# Patient Record
Sex: MALE | Race: BLACK OR AFRICAN AMERICAN | Employment: UNEMPLOYED | URBAN - METROPOLITAN AREA
[De-identification: names, ages, dates, MRNs, and addresses within clinical notes are randomized per-mention and may not be internally consistent; named-entity substitution may affect disease eponyms.]

---

## 2021-01-06 ENCOUNTER — APPOINTMENT (OUTPATIENT)
Dept: GENERAL RADIOLOGY | Age: 61
End: 2021-01-06
Payer: MEDICARE

## 2021-01-06 ENCOUNTER — HOSPITAL ENCOUNTER (EMERGENCY)
Age: 61
Discharge: HOME | End: 2021-01-06
Attending: EMERGENCY MEDICINE
Payer: MEDICARE

## 2021-01-06 VITALS
TEMPERATURE: 97.5 F | RESPIRATION RATE: 16 BRPM | DIASTOLIC BLOOD PRESSURE: 96 MMHG | SYSTOLIC BLOOD PRESSURE: 138 MMHG | OXYGEN SATURATION: 98 % | BODY MASS INDEX: 25.11 KG/M2 | HEART RATE: 79 BPM | WEIGHT: 160 LBS | HEIGHT: 67 IN
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
## 2021-01-06 DIAGNOSIS — M25.552 LEFT HIP PAIN: Primary | ICD-10-CM

## 2021-01-06 PROCEDURE — 73030 X-RAY EXAM OF SHOULDER: CPT

## 2021-01-06 PROCEDURE — 6370000000 HC RX 637 (ALT 250 FOR IP): Performed by: STUDENT IN AN ORGANIZED HEALTH CARE EDUCATION/TRAINING PROGRAM

## 2021-01-06 PROCEDURE — 73130 X-RAY EXAM OF HAND: CPT

## 2021-01-06 PROCEDURE — 73502 X-RAY EXAM HIP UNI 2-3 VIEWS: CPT

## 2021-01-06 PROCEDURE — 99284 EMERGENCY DEPT VISIT MOD MDM: CPT

## 2021-01-06 RX ORDER — IBUPROFEN 400 MG/1
400 TABLET ORAL ONCE
Status: COMPLETED | OUTPATIENT
Start: 2021-01-06 | End: 2021-01-06

## 2021-01-06 RX ORDER — ACETAMINOPHEN 500 MG
1000 TABLET ORAL ONCE
Status: COMPLETED | OUTPATIENT
Start: 2021-01-06 | End: 2021-01-06

## 2021-01-06 RX ORDER — METHOCARBAMOL 750 MG/1
750 TABLET, FILM COATED ORAL 4 TIMES DAILY
Qty: 12 TABLET | Refills: 0 | Status: SHIPPED | OUTPATIENT
Start: 2021-01-06 | End: 2021-01-09

## 2021-01-06 RX ORDER — LIDOCAINE 4 G/G
1 PATCH TOPICAL DAILY
Status: DISCONTINUED | OUTPATIENT
Start: 2021-01-06 | End: 2021-01-06 | Stop reason: HOSPADM

## 2021-01-06 RX ORDER — CYCLOBENZAPRINE HCL 10 MG
5 TABLET ORAL ONCE
Status: COMPLETED | OUTPATIENT
Start: 2021-01-06 | End: 2021-01-06

## 2021-01-06 RX ADMIN — IBUPROFEN 400 MG: 400 TABLET, FILM COATED ORAL at 13:01

## 2021-01-06 RX ADMIN — ACETAMINOPHEN 1000 MG: 500 TABLET ORAL at 13:01

## 2021-01-06 RX ADMIN — CYCLOBENZAPRINE 5 MG: 10 TABLET, FILM COATED ORAL at 13:01

## 2021-01-06 ASSESSMENT — ENCOUNTER SYMPTOMS
NAUSEA: 0
BACK PAIN: 0
ABDOMINAL PAIN: 0
VOMITING: 0
SHORTNESS OF BREATH: 0

## 2021-01-06 ASSESSMENT — PAIN DESCRIPTION - LOCATION: LOCATION: HIP;ARM

## 2021-01-06 ASSESSMENT — PAIN SCALES - GENERAL
PAINLEVEL_OUTOF10: 9
PAINLEVEL_OUTOF10: 6

## 2021-01-06 ASSESSMENT — PAIN DESCRIPTION - ORIENTATION: ORIENTATION: LEFT
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
## 2021-01-06 NOTE — ED PROVIDER NOTES
[facility]     Emergency Department     Faculty Attestation    I performed a history and physical examination of the patient and discussed management with the resident. I have reviewed and agree with the residents findings including all diagnostic interpretations, and treatment plans as written. Any areas of disagreement are noted on the chart. I was personally present for the key portions of any procedures. I have documented in the chart those procedures where I was not present during the key portions. I have reviewed the emergency nurses triage note. I agree with the chief complaint, past medical history, past surgical history, allergies, medications, social and family history as documented unless otherwise noted below. Documentation of the HPI, Physical Exam and Medical Decision Making performed by scribes is based on my personal performance of the HPI, PE and MDM. For Physician Assistant/ Nurse Practitioner cases/documentation I have personally evaluated this patient and have completed at least one if not all key elements of the E/M (history, physical exam, and MDM). Additional findings are as noted.         Eric Liz D.O, M.P.H  Attending Emergency Medicine Physician         Eric Liz DO  01/06/21 3121

## 2021-05-27 ENCOUNTER — HOSPITAL ENCOUNTER (EMERGENCY)
Age: 61
Discharge: LEFT BEFORE DISCHARGE | End: 2021-05-27
Attending: EMERGENCY MEDICINE
Payer: MEDICARE

## 2021-05-27 ENCOUNTER — APPOINTMENT (OUTPATIENT)
Dept: GENERAL RADIOLOGY | Age: 61
End: 2021-05-27
Payer: MEDICARE

## 2021-05-27 VITALS
HEART RATE: 77 BPM | OXYGEN SATURATION: 98 % | DIASTOLIC BLOOD PRESSURE: 92 MMHG | SYSTOLIC BLOOD PRESSURE: 140 MMHG | WEIGHT: 150 LBS | BODY MASS INDEX: 23.54 KG/M2 | HEIGHT: 67 IN | TEMPERATURE: 102.4 F | RESPIRATION RATE: 22 BRPM

## 2021-05-27 DIAGNOSIS — R52 BODY ACHES: ICD-10-CM

## 2021-05-27 DIAGNOSIS — R11.2 NAUSEA AND VOMITING, INTRACTABILITY OF VOMITING NOT SPECIFIED, UNSPECIFIED VOMITING TYPE: ICD-10-CM

## 2021-05-27 DIAGNOSIS — R05.9 COUGH: Primary | ICD-10-CM

## 2021-05-27 DIAGNOSIS — M79.10 MYALGIA: ICD-10-CM

## 2021-05-27 LAB
-: NORMAL
ABSOLUTE EOS #: 0 K/UL (ref 0–0.4)
ABSOLUTE IMMATURE GRANULOCYTE: 0 K/UL (ref 0–0.3)
ABSOLUTE LYMPH #: 0.57 K/UL (ref 1–4.8)
ABSOLUTE MONO #: 0.38 K/UL (ref 0.1–0.8)
ALBUMIN SERPL-MCNC: 4.2 G/DL (ref 3.5–5.2)
ALBUMIN/GLOBULIN RATIO: 1.3 (ref 1–2.5)
ALP BLD-CCNC: 56 U/L (ref 40–129)
ALT SERPL-CCNC: 8 U/L (ref 5–41)
AMORPHOUS: NORMAL
ANION GAP SERPL CALCULATED.3IONS-SCNC: 11 MMOL/L (ref 9–17)
AST SERPL-CCNC: 17 U/L
BACTERIA: NORMAL
BASOPHILS # BLD: 0 % (ref 0–2)
BASOPHILS ABSOLUTE: 0 K/UL (ref 0–0.2)
BILIRUB SERPL-MCNC: 0.77 MG/DL (ref 0.3–1.2)
BILIRUBIN URINE: NEGATIVE
BUN BLDV-MCNC: 8 MG/DL (ref 8–23)
BUN/CREAT BLD: NORMAL (ref 9–20)
CALCIUM SERPL-MCNC: 8.9 MG/DL (ref 8.6–10.4)
CASTS UA: NORMAL /LPF (ref 0–8)
CHLORIDE BLD-SCNC: 107 MMOL/L (ref 98–107)
CO2: 23 MMOL/L (ref 20–31)
COLOR: YELLOW
CREAT SERPL-MCNC: 0.85 MG/DL (ref 0.7–1.2)
CRYSTALS, UA: NORMAL /HPF
DIFFERENTIAL TYPE: ABNORMAL
EOSINOPHILS RELATIVE PERCENT: 0 % (ref 1–4)
EPITHELIAL CELLS UA: NORMAL /HPF (ref 0–5)
GFR AFRICAN AMERICAN: >60 ML/MIN
GFR NON-AFRICAN AMERICAN: >60 ML/MIN
GFR SERPL CREATININE-BSD FRML MDRD: NORMAL ML/MIN/{1.73_M2}
GFR SERPL CREATININE-BSD FRML MDRD: NORMAL ML/MIN/{1.73_M2}
GLUCOSE BLD-MCNC: 91 MG/DL (ref 70–99)
GLUCOSE URINE: NEGATIVE
HCT VFR BLD CALC: 39.1 % (ref 40.7–50.3)
HEMOGLOBIN: 13.3 G/DL (ref 13–17)
IMMATURE GRANULOCYTES: 0 %
INR BLD: 1.1
KETONES, URINE: NEGATIVE
LACTIC ACID, SEPSIS WHOLE BLOOD: 1.5 MMOL/L (ref 0.5–1.9)
LACTIC ACID, SEPSIS: NORMAL MMOL/L (ref 0.5–1.9)
LEUKOCYTE ESTERASE, URINE: NEGATIVE
LYMPHOCYTES # BLD: 9 % (ref 24–44)
MCH RBC QN AUTO: 31.1 PG (ref 25.2–33.5)
MCHC RBC AUTO-ENTMCNC: 34 G/DL (ref 28.4–34.8)
MCV RBC AUTO: 91.6 FL (ref 82.6–102.9)
MONOCYTES # BLD: 6 % (ref 1–7)
MORPHOLOGY: NORMAL
MUCUS: NORMAL
NITRITE, URINE: NEGATIVE
NRBC AUTOMATED: 0 PER 100 WBC
OTHER OBSERVATIONS UA: NORMAL
PARTIAL THROMBOPLASTIN TIME: 27.3 SEC (ref 20.5–30.5)
PDW BLD-RTO: 12.7 % (ref 11.8–14.4)
PH UA: 6.5 (ref 5–8)
PLATELET # BLD: 249 K/UL (ref 138–453)
PLATELET ESTIMATE: ABNORMAL
PMV BLD AUTO: 9.6 FL (ref 8.1–13.5)
POTASSIUM SERPL-SCNC: 3.8 MMOL/L (ref 3.7–5.3)
PROCALCITONIN: 0.05 NG/ML
PROTEIN UA: NEGATIVE
PROTHROMBIN TIME: 11.4 SEC (ref 9.1–12.3)
RBC # BLD: 4.27 M/UL (ref 4.21–5.77)
RBC # BLD: ABNORMAL 10*6/UL
RBC UA: NORMAL /HPF (ref 0–4)
RENAL EPITHELIAL, UA: NORMAL /HPF
SARS-COV-2, RAPID: NOT DETECTED
SEG NEUTROPHILS: 85 % (ref 36–66)
SEGMENTED NEUTROPHILS ABSOLUTE COUNT: 5.35 K/UL (ref 1.8–7.7)
SODIUM BLD-SCNC: 141 MMOL/L (ref 135–144)
SPECIFIC GRAVITY UA: 1.01 (ref 1–1.03)
SPECIMEN DESCRIPTION: NORMAL
TOTAL PROTEIN: 7.4 G/DL (ref 6.4–8.3)
TRICHOMONAS: NORMAL
TURBIDITY: CLEAR
URINE HGB: NEGATIVE
UROBILINOGEN, URINE: NORMAL
WBC # BLD: 6.3 K/UL (ref 3.5–11.3)
WBC # BLD: ABNORMAL 10*3/UL
WBC UA: NORMAL /HPF (ref 0–5)
YEAST: NORMAL

## 2021-05-27 PROCEDURE — 36415 COLL VENOUS BLD VENIPUNCTURE: CPT

## 2021-05-27 PROCEDURE — 83605 ASSAY OF LACTIC ACID: CPT

## 2021-05-27 PROCEDURE — 6370000000 HC RX 637 (ALT 250 FOR IP): Performed by: HEALTH CARE PROVIDER

## 2021-05-27 PROCEDURE — 71045 X-RAY EXAM CHEST 1 VIEW: CPT

## 2021-05-27 PROCEDURE — 80053 COMPREHEN METABOLIC PANEL: CPT

## 2021-05-27 PROCEDURE — 85730 THROMBOPLASTIN TIME PARTIAL: CPT

## 2021-05-27 PROCEDURE — 81001 URINALYSIS AUTO W/SCOPE: CPT

## 2021-05-27 PROCEDURE — 85610 PROTHROMBIN TIME: CPT

## 2021-05-27 PROCEDURE — 93005 ELECTROCARDIOGRAM TRACING: CPT | Performed by: HEALTH CARE PROVIDER

## 2021-05-27 PROCEDURE — 2580000003 HC RX 258: Performed by: HEALTH CARE PROVIDER

## 2021-05-27 PROCEDURE — 84145 PROCALCITONIN (PCT): CPT

## 2021-05-27 PROCEDURE — 87635 SARS-COV-2 COVID-19 AMP PRB: CPT

## 2021-05-27 PROCEDURE — 85025 COMPLETE CBC W/AUTO DIFF WBC: CPT

## 2021-05-27 PROCEDURE — 87040 BLOOD CULTURE FOR BACTERIA: CPT

## 2021-05-27 PROCEDURE — 87086 URINE CULTURE/COLONY COUNT: CPT

## 2021-05-27 PROCEDURE — 99284 EMERGENCY DEPT VISIT MOD MDM: CPT

## 2021-05-27 RX ORDER — ONDANSETRON 4 MG/1
4 TABLET, ORALLY DISINTEGRATING ORAL ONCE
Status: COMPLETED | OUTPATIENT
Start: 2021-05-27 | End: 2021-05-27

## 2021-05-27 RX ORDER — IBUPROFEN 800 MG/1
800 TABLET ORAL ONCE
Status: DISCONTINUED | OUTPATIENT
Start: 2021-05-27 | End: 2021-05-27 | Stop reason: HOSPADM

## 2021-05-27 RX ORDER — SODIUM CHLORIDE, SODIUM LACTATE, POTASSIUM CHLORIDE, AND CALCIUM CHLORIDE .6; .31; .03; .02 G/100ML; G/100ML; G/100ML; G/100ML
30 INJECTION, SOLUTION INTRAVENOUS ONCE
Status: COMPLETED | OUTPATIENT
Start: 2021-05-27 | End: 2021-05-27

## 2021-05-27 RX ORDER — BENZONATATE 100 MG/1
100 CAPSULE ORAL 3 TIMES DAILY PRN
Status: DISCONTINUED | OUTPATIENT
Start: 2021-05-27 | End: 2021-05-27 | Stop reason: HOSPADM

## 2021-05-27 RX ORDER — ACETAMINOPHEN 500 MG
1000 TABLET ORAL ONCE
Status: COMPLETED | OUTPATIENT
Start: 2021-05-27 | End: 2021-05-27

## 2021-05-27 RX ADMIN — ONDANSETRON 4 MG: 4 TABLET, ORALLY DISINTEGRATING ORAL at 20:23

## 2021-05-27 RX ADMIN — BENZONATATE 100 MG: 100 CAPSULE ORAL at 20:22

## 2021-05-27 RX ADMIN — ACETAMINOPHEN 1000 MG: 500 TABLET ORAL at 20:23

## 2021-05-27 RX ADMIN — SODIUM CHLORIDE, POTASSIUM CHLORIDE, SODIUM LACTATE AND CALCIUM CHLORIDE 2040 ML: 600; 310; 30; 20 INJECTION, SOLUTION INTRAVENOUS at 20:22

## 2021-05-27 ASSESSMENT — PAIN SCALES - GENERAL
PAINLEVEL_OUTOF10: 10
PAINLEVEL_OUTOF10: 10

## 2021-05-27 ASSESSMENT — ENCOUNTER SYMPTOMS
TROUBLE SWALLOWING: 0
SHORTNESS OF BREATH: 1
COUGH: 1
ABDOMINAL PAIN: 1
DIARRHEA: 0
VOMITING: 1
NAUSEA: 1
TACHYPNEA: 1
CHEST TIGHTNESS: 1

## 2021-05-27 NOTE — ED PROVIDER NOTES
18      Comments    Cough since this am  Fever  abd pain, n/v  Fatigue  Not vaccinated  Loss of smell      Plan for basic labs, COVID, fluids, sx tx, CXR, UA    Update, I was informed by the nurse that the patient became agitated and abruptly left the department. His work-up was mostly unremarkable, Covid was negative, inflammatory markers were reassuring.         (Please note that portions of this note were completed with a voice recognition program.  Efforts were made to edit the dictations but occasionally words are mis-transcribed.)      Rina Schaefer MD,, MD  Attending Emergency Physician         Rina Schaefer MD  05/27/21 0713

## 2021-05-27 NOTE — ED PROVIDER NOTES
[facility]  Emergency Department Encounter  Emergency Medicine Resident     Pt Name: Georgina Garcia  MRN: [id number]  [place] 1960  Date of evaluation: 5/27/21  PCP:  Johnathan Jose MD    [address] [place] [address]       Chief Complaint   Patient presents with    Cough    Generalized Body Aches       HISTORY OFPRESENT ILLNESS  (Location/Symptom, Timing/Onset, Context/Setting, Quality, Duration, Modifying [place] Numbers.)      Georgina Garcia is a 63 y.o. male who presents with acute onset respiratory symptoms. Patient awoke this morning with severe cough. Patient has been having productive cough frequently since that time. He also complains of subjective fevers, chills and body aches. He has also had multiple episodes of nausea and nonbilious, nonbloody emesis. Additionally, complains of anosmia. This developed after the onset of the other symptoms. PAST MEDICAL / SURGICAL / SOCIAL / FAMILY HISTORY      has a past medical history of Chronic kidney disease, Degenerative disk disease, Depression, Hematuria, Paranoia ([address] and Schizophrenia ([facility] [address] has a past surgical history that includes hernia repair (3/2010); Cystoscopy (12/18/12); Ureteroscopy; and Lithotripsy (12/18/12). Social History     Socioeconomic History    Marital status:      Spouse name: Not on file    Number of children: Not on file    Years of education: Not on file    Highest education level: Not on file   Occupational History    Not on file   Tobacco Use    Smoking status: Current Every Day Smoker     Packs/day: 1.00     Types: Cigarettes    Smokeless tobacco: Never Used    Tobacco comment: 2-3 cigarettes a day   Substance and Sexual Activity    Alcohol use:  Yes     Alcohol/week: 21.0 standard drinks     Types: 21 Cans of beer per week    Drug use: No    Sexual activity: Not on file   Other Topics Concern    Not on file   Social History Narrative    Not on file     Social Determinants of Health     Financial Resource Strain:     Difficulty of Paying Living Expenses:    Food Insecurity:     Worried About [address] in the Last Year:     [address] in the Last Year:    Transportation Needs:     Lack of Transportation (Medical):  Lack of Transportation (Non-Medical):    Physical Activity:     Days of Exercise per Week:     Minutes of Exercise per Session:    Stress:     Feeling of Stress :    Social Connections:     Frequency of Communication with Friends and Family:     Frequency of Social Gatherings with Friends and Family:     Attends [employer] Services:     Active Member of Clubs or Organizations:     Attends Club or Organization Meetings:     Marital Status:    Intimate Partner Violence:     Fear of Current or Ex-Partner:     Emotionally Abused:     Physically Abused:     Sexually Abused:        History reviewed. No pertinent family history. Allergies:  Reglan [metoclopramide] and Pcn [penicillins]    Home Medications:  Prior to Admission medications    Medication Sig Start Date End Date Taking? Authorizing Provider   oxyCODONE-acetaminophen (PERCOCET) 5-325 MG per tablet Take 1 tablet by mouth every 8 hours as needed for Pain 12/31/15  Yes Ed Barajas, DO   traZODone (DESYREL) 100 MG tablet Take 1 tablet by mouth Daily. 11/14/14  Yes Aaron Smith DO   QUEtiapine (SEROQUEL XR) 50 MG XR tablet Take 3 tablets by mouth daily. 11/14/14  Yes Aaron Smith,    sertraline (ZOLOFT) 50 MG tablet Take 1 tablet by mouth daily. 11/14/14  Yes Aaron Smith DO   albuterol (PROVENTIL HFA) 108 (90 BASE) MCG/ACT inhaler Inhale 1-2 puffs into the lungs every 4 hours as needed for Wheezing. 4/8/14  Yes Disha Pérez, DO   butalbital-acetaminophen-caffeine (FIORICET) per tablet Take 1 tablet by mouth every 6 hours as needed for Headaches.  9/20/13  Yes Dilcia Prasad MD       REVIEW OFSYSTEMS    (2-9 systems for level 4, 10 or more for level 5)      Review of Systems   Constitutional: Positive for chills, fatigue and fever. HENT: Negative for trouble swallowing. Eyes: Negative for visual disturbance. Respiratory: Positive for cough, chest tightness and shortness of breath. Cardiovascular: Positive for chest pain. Gastrointestinal: Positive for abdominal pain, nausea and vomiting. Negative for diarrhea. Genitourinary: Negative for decreased urine volume and dysuria. Skin: Negative for pallor. Allergic/Immunologic: Negative for immunocompromised state. Neurological: Negative for dizziness and weakness. Hematological: Does not bruise/bleed easily. Psychiatric/Behavioral: Negative for confusion. PHYSICAL EXAM   (up to 7 for level 4, 8 or more forlevel 5)      INITIAL VITALS:     Vitals:    05/27/21 2120   BP: (!) 140/92   Pulse: 95   Resp: 22   Temp:  102.4 °F   SpO2: 98%         Physical Exam  Vitals reviewed. Constitutional:       Appearance: He is ill-appearing. HENT:      Head: Normocephalic and atraumatic. Right Ear: External ear normal.      Left Ear: External ear normal.      Nose: Nose normal.      Mouth/Throat:      Mouth: Mucous membranes are moist.      Pharynx: No posterior oropharyngeal erythema. Eyes:      Extraocular Movements: Extraocular movements intact. Conjunctiva/sclera: Conjunctivae normal.   Cardiovascular:      Rate and Rhythm: Regular rhythm. Tachycardia present. Pulses: Normal pulses. Heart sounds: Normal heart sounds. No murmur heard. No friction rub. No gallop. Pulmonary:      Effort: Pulmonary effort is normal. No respiratory distress. Breath sounds: Normal breath sounds. No wheezing or rales. Abdominal:      General: There is no distension. Palpations: Abdomen is soft. Tenderness: There is abdominal tenderness. There is no guarding or rebound. Comments: Mild diffuse tender to palpation. No rebound or guarding.    Musculoskeletal:      Cervical back: Normal range of motion and neck supple. No rigidity. Right lower leg: No edema. Left lower leg: No edema. Skin:     General: Skin is warm and dry. Capillary Refill: Capillary refill takes less than 2 seconds. Neurological:      General: No focal deficit present. Mental Status: He is alert and oriented to person, place, and time. Psychiatric:         Mood and Affect: Mood normal.         Behavior: Behavior normal.         DIFFERENTIAL  DIAGNOSIS     PLAN (LABS / IMAGING / EKG):  Orders Placed This Encounter   Procedures    Culture, Urine    Culture, Blood 1    Culture, Blood 2    COVID-19, Rapid    XR CHEST PORTABLE    CBC auto differential    Comprehensive Metabolic Panel w/ Reflex to MG    APTT    Protime-INR    Urinalysis with Microscopic    EKG 12 Lead       MEDICATIONS ORDERED:  Orders Placed This Encounter   Medications    lactated ringers bolus    DISCONTD: benzonatate (TESSALON) capsule 100 mg    acetaminophen (TYLENOL) tablet 1,000 mg    ondansetron (ZOFRAN-ODT) disintegrating tablet 4 mg    DISCONTD: ibuprofen (ADVIL;MOTRIN) tablet 800 mg       DDX: COVID-19, pneumonia, viral URI, sepsis    Initial MDM/Plan: 63 y.o. male who presents with fever, systemic symptoms, cough, chest pain and abdominal pain with nausea and vomiting. Patient has SIRS criteria with high suspicion for septic picture. COVID-19 is also highly probable. Will obtain septic work-up and give fluid bolus.   Will hold broad-spectrum antibiotics pending results of labs and COVID-19 test.    DIAGNOSTIC RESULTS / EMERGENCYDEPARTMENT COURSE / MDM     LABS:  Labs Reviewed   CBC WITH AUTO DIFFERENTIAL - Abnormal; Notable for the following components:       Result Value    Hematocrit 39.1 (*)     Seg Neutrophils 85 (*)     Lymphocytes 9 (*)     Eosinophils % 0 (*)     Absolute Lymph # 0.57 (*)     All other components within normal limits   COVID-19, RAPID   CULTURE, URINE   CULTURE, BLOOD 1   CULTURE, BLOOD 2

## 2021-05-28 ENCOUNTER — HOSPITAL ENCOUNTER (EMERGENCY)
Age: 61
Discharge: HOME | End: 2021-05-28
Attending: EMERGENCY MEDICINE
Payer: MEDICARE

## 2021-05-28 VITALS
DIASTOLIC BLOOD PRESSURE: 101 MMHG | BODY MASS INDEX: 23.54 KG/M2 | WEIGHT: 150 LBS | HEIGHT: 67 IN | HEART RATE: 67 BPM | RESPIRATION RATE: 16 BRPM | OXYGEN SATURATION: 96 % | TEMPERATURE: 98.4 F | SYSTOLIC BLOOD PRESSURE: 151 MMHG

## 2021-05-28 DIAGNOSIS — B34.9 VIRAL ILLNESS: Primary | ICD-10-CM

## 2021-05-28 LAB
CULTURE: NO GROWTH
EKG ATRIAL RATE: 84 BPM
EKG P AXIS: 65 DEGREES
EKG P-R INTERVAL: 168 MS
EKG Q-T INTERVAL: 340 MS
EKG QRS DURATION: 84 MS
EKG QTC CALCULATION (BAZETT): 401 MS
EKG R AXIS: -31 DEGREES
EKG T AXIS: 21 DEGREES
EKG VENTRICULAR RATE: 84 BPM
Lab: NORMAL
SPECIMEN DESCRIPTION: NORMAL

## 2021-05-28 PROCEDURE — 93010 ELECTROCARDIOGRAM REPORT: CPT | Performed by: INTERNAL MEDICINE

## 2021-05-28 PROCEDURE — 99285 EMERGENCY DEPT VISIT HI MDM: CPT

## 2021-05-28 PROCEDURE — 6370000000 HC RX 637 (ALT 250 FOR IP): Performed by: STUDENT IN AN ORGANIZED HEALTH CARE EDUCATION/TRAINING PROGRAM

## 2021-05-28 RX ORDER — ACETAMINOPHEN 500 MG
1000 TABLET ORAL ONCE
Status: COMPLETED | OUTPATIENT
Start: 2021-05-28 | End: 2021-05-28

## 2021-05-28 RX ORDER — IBUPROFEN 400 MG/1
600 TABLET ORAL ONCE
Status: COMPLETED | OUTPATIENT
Start: 2021-05-28 | End: 2021-05-28

## 2021-05-28 RX ADMIN — IBUPROFEN 600 MG: 400 TABLET, FILM COATED ORAL at 15:07

## 2021-05-28 RX ADMIN — ACETAMINOPHEN 1000 MG: 500 TABLET ORAL at 15:07

## 2021-05-28 RX ADMIN — BENZOCAINE AND MENTHOL 1 LOZENGE: 15; 3.6 LOZENGE ORAL at 15:07

## 2021-05-28 ASSESSMENT — ENCOUNTER SYMPTOMS
SORE THROAT: 1
NAUSEA: 0
COUGH: 1
ABDOMINAL PAIN: 0
VOMITING: 0
SHORTNESS OF BREATH: 0
BACK PAIN: 0

## 2021-05-28 ASSESSMENT — PAIN SCALES - GENERAL
PAINLEVEL_OUTOF10: 8
PAINLEVEL_OUTOF10: 7

## 2021-05-28 NOTE — ED PROVIDER NOTES
[facility] ED  Emergency Department  Senior Resident Attestation     Primary Care Physician  Jessica Frey MD    I performed a history and physical examination of the patient and discussed management with the farheen resident. I reviewed the farheen residents note and agree with the documented findings and plan of care. Any areas of disagreement are noted on the chart. Case was then discussed with Faculty Attending Supervisor for additional medical management. PERTINENT Senior resident PHYSICIAN COMMENTS:        PHYSICAL:     I agree with resident exam with exceptions below. HPI/Pertinant ROS/ Pertinant PE/ IMPRESSION/Plan:       Patient with cough myalgias viral-like syndrome seen yesterday concern for sepsis given SIRS criteria and fever yesterday. Patient had broad work-up yesterday, sepsis labs procalcitonin chest x-ray    There is no infiltrate on chest x-ray he tested negative for Covid, his urine was unremarkable lactate was not elevated procalcitonin was not elevated    Patient given fluids and symptomatic treatment and eloped prior to completion of visit    Patient returns today with similar symptoms    Vital signs stable not in respiratory distress. Breath sounds clear without any focal rales or rhonchi.   Patient is coughing this appears upper respiratory described this is likely viral bronchitis will give symptomatic medication having follow-up with PCP return precautions for worsening signs and symptoms to progression of bacterial bronchitis or pneumonia given             CRITICAL CARE TIME:    Procedures:            Janet Allred DO  Senior Resident Physician    (Please note that portions of this note were completed with a voice recognition program.  Efforts were made to edit the dictations but occasionally words are mis-transcribed.)       Janet Allred DO  Resident  05/28/21 8706

## 2021-05-28 NOTE — ED PROVIDER NOTES
[facility]  ED  Emergency Department Encounter  EmergencyMedicine Resident     Pt Name:James Araiza  MRN: [id number]  [place] 1960  Date of evaluation: 5/28/21  PCP:  Edward Harding MD    [address] [place] [address]       Chief Complaint   Patient presents with    Shortness of Breath    Pharyngitis    Concern For COVID-19       HISTORY OF PRESENT ILLNESS  (Location/Symptom, Timing/Onset, Context/Setting, Quality, Duration, Modifying Factors, Severity.)      Deep Foss is a 63 y.o. male who presents with multiple day history of cough, sore throat, myalgias, headaches, patient was seen yesterday in the emergency department for same, however eloped as he was \"waiting too long\". Patient received septic work-up yesterday in the ED, all labs were unremarkable, negative for COVID-19. Patient is a smoker, however no history of asthma or COPD. Patient has not received his COVID-19 vaccines, however would like to. Discussed with patient regarding vaccine appointment at pharmacies. PAST MEDICAL / SURGICAL / SOCIAL / FAMILY HISTORY      has a past medical history of Chronic kidney disease, Degenerative disk disease, Depression, Hematuria, Paranoia ([facility] [address] and Schizophrenia ([facility] [address] has a past surgical history that includes hernia repair (3/2010); Cystoscopy (12/18/12); Ureteroscopy; and Lithotripsy (12/18/12). Social History     Socioeconomic History    Marital status:      Spouse name: Not on file    Number of children: Not on file    Years of education: Not on file    Highest education level: Not on file   Occupational History    Not on file   Tobacco Use    Smoking status: Current Every Day Smoker     Packs/day: 1.00     Types: Cigarettes    Smokeless tobacco: Never Used    Tobacco comment: 2-3 cigarettes a day   Substance and Sexual Activity    Alcohol use:  Yes     Alcohol/week: 21.0 standard drinks     Types: 21 Cans of beer per week    Drug use: No    Sexual activity: Not on file   Other Topics Concern    Not on file   Social History Narrative    Not on file     Social Determinants of Health     Financial Resource Strain:     Difficulty of Paying Living Expenses:    Food Insecurity:     Worried About Running Out of Food in the Last Year:     [address] in the Last Year:    Transportation Needs:     Lack of Transportation (Medical):  Lack of Transportation (Non-Medical):    Physical Activity:     Days of Exercise per Week:     Minutes of Exercise per Session:    Stress:     Feeling of Stress :    Social Connections:     Frequency of Communication with Friends and Family:     Frequency of Social Gatherings with Friends and Family:     Attends Zoroastrianism Services:     Active Member of Clubs or Organizations:     Attends Club or Organization Meetings:     Marital Status:    Intimate Partner Violence:     Fear of Current or Ex-Partner:     Emotionally Abused:     Physically Abused:     Sexually Abused:        History reviewed. No pertinent family history. Allergies:  Reglan [metoclopramide] and Pcn [penicillins]    Home Medications:  Prior to Admission medications    Medication Sig Start Date End Date Taking? Authorizing Provider   Cetylpyridinium Chloride (CEPACOL MOUTHWASH/GARGLE) 0.05 % LIQD Take 1 lozenge by mouth 4 times daily as needed (Cough) 5/28/21  Yes E Kelsi Youngblood MD   oxyCODONE-acetaminophen (PERCOCET) 5-325 MG per tablet Take 1 tablet by mouth every 8 hours as needed for Pain 12/31/15   Crow Ghosh DO   traZODone (DESYREL) 100 MG tablet Take 1 tablet by mouth Daily. 11/14/14   Maxwell Finch DO   QUEtiapine (SEROQUEL XR) 50 MG XR tablet Take 3 tablets by mouth daily. 11/14/14   Maxwell Finch DO   sertraline (ZOLOFT) 50 MG tablet Take 1 tablet by mouth daily. 11/14/14   Maxwell Finch DO   albuterol (PROVENTIL HFA) 108 (90 BASE) MCG/ACT inhaler Inhale 1-2 puffs into the lungs every 4 hours as needed for Wheezing.  4/8/14   Millicent Steiner DO butalbital-acetaminophen-caffeine (FIORICET) per tablet Take 1 tablet by mouth every 6 hours as needed for Headaches. 9/20/13   Esther Rocha MD       REVIEW OF SYSTEMS    (2-9 systems for level 4, 10 or more for level 5)      Review of Systems   Constitutional: Negative for chills and fever. HENT: Positive for sore throat. Eyes: Negative for visual disturbance. Respiratory: Positive for cough. Negative for shortness of breath. Cardiovascular: Negative for chest pain and palpitations. Gastrointestinal: Negative for abdominal pain, nausea and vomiting. Endocrine: Negative for polyuria. Genitourinary: Negative for dysuria and hematuria. Musculoskeletal: Positive for myalgias. Negative for back pain. Skin: Negative for rash. Allergic/Immunologic: Negative for food allergies. Neurological: Positive for headaches. Negative for light-headedness. Psychiatric/Behavioral: Negative for confusion. PHYSICAL EXAM   (up to 7 for level 4, 8 or more for level 5)      INITIAL VITALS:   BP (!) 151/101   Pulse 67   Temp 98.4 °F (36.9 °C) (Oral)   Resp 16   Ht 5' 7\" (1.702 m)   Wt 150 lb (68 kg)   SpO2 96%   BMI 23.49 kg/m²     Physical Exam  Constitutional:       General: He is not in acute distress. Appearance: He is well-developed and normal weight. HENT:      Head: Normocephalic. Eyes:      Extraocular Movements: Extraocular movements intact. Pupils: Pupils are equal, round, and reactive to light. Cardiovascular:      Rate and Rhythm: Normal rate and regular rhythm. Pulmonary:      Effort: Pulmonary effort is normal. No tachypnea. Breath sounds: Normal breath sounds. No decreased breath sounds or wheezing. Abdominal:      Palpations: Abdomen is soft. Tenderness: There is no abdominal tenderness. Musculoskeletal:         General: Normal range of motion. Right lower leg: No tenderness. No edema. Left lower leg: No tenderness. No edema.    Skin: General: Skin is warm. Capillary Refill: Capillary refill takes less than 2 seconds. Neurological:      General: No focal deficit present. Mental Status: He is alert and oriented to person, place, and time. Psychiatric:         Mood and Affect: Mood normal.       DIFFERENTIAL  DIAGNOSIS     PLAN (LABS / IMAGING / EKG):  No orders of the defined types were placed in this encounter. MEDICATIONS ORDERED:  Orders Placed This Encounter   Medications    benzocaine-menthol (CEPACOL SORE THROAT) lozenge 1 lozenge    acetaminophen (TYLENOL) tablet 1,000 mg    ibuprofen (ADVIL;MOTRIN) tablet 600 mg    Cetylpyridinium Chloride (CEPACOL MOUTHWASH/GARGLE) 0.05 % LIQD     Sig: Take 1 lozenge by mouth 4 times daily as needed (Cough)     Dispense:  1 Bottle     Refill:  0       DDX: Influenza, Covid, URTI, viral prodrome    DIAGNOSTIC RESULTS / EMERGENCY DEPARTMENT COURSE / MDM   LAB RESULTS:  No results found for this visit on 05/28/21. IMPRESSION: 79-year-old gentleman presents to the emergency department after eloping yesterday, patient complaining of similar symptoms of yesterday but concern for COVID-19 as was tested but was not resulted before patient eloped. Patient describes no new symptoms today. Physical exam largely unremarkable. Cepacol, Tylenol and Motrin given in ED. Discussed with patient his COVID-19 negative results. Patient at this time stable for discharge as nonconcerning for new symptoms and all labs were unremarkable yesterday. Discussed with patient regarding follow-up with primary care doctor and strict return precautions, patient verbalized agreement and understanding. EMERGENCY DEPARTMENT COURSE:        PROCEDURES:  None    CONSULTS:  None    CRITICAL CARE:  Please see attending note    FINAL IMPRESSION      1.  Viral illness          DISPOSITION / PLAN     DISPOSITION        [facility]  [facility] [address]  [phone]    Schedule an appointment as soon as possible for a visit   For follow up    [facility]  [address]  Go to   As needed      DISCHARGE MEDICATIONS:  Discharge Medication List as of 5/28/2021  3:17 PM      START taking these medications    Details   Cetylpyridinium Chloride (CEPACOL MOUTHWASH/GARGLE) 0.05 % LIQD Take 1 lozenge by mouth 4 times daily as needed (Cough), Disp-1 Bottle, R-0Print             E Stiven Elias MD  Emergency Medicine Resident    (Please note that portions of thisnote were completed with a voice recognition program.  Efforts were made to edit the dictations but occasionally words are mis-transcribed.)        Derrick Flower MD  Resident  05/28/21 5993

## 2021-05-28 NOTE — ED NOTES
Writer entered the doorway and educated patient that I need to put on PPE before entering. Per pt \"im locked in here, I can breathe, just get me out of here, this is a waste of my time. \"  Pt removed iv, writer applied proper PPE entered room. IV dressing applied. Pt educated on adverse events leaving AMA. Pt verbalized understanding. Pt ambulatory with steady gait to triage, wearing mask.       Dante Adler RN  05/27/21 9779

## 2021-05-28 NOTE — ED PROVIDER NOTES
[facility]     Emergency Department     Faculty Attestation    I performed a history and physical examination of the patient and discussed management with the resident. I reviewed the resident´s note and agree with the documented findings and plan of care. Any areas of disagreement are noted on the chart. I was personally present for the key portions of any procedures. I have documented in the chart those procedures where I was not present during the key portions. I have reviewed the emergency nurses triage note. I agree with the chief complaint, past medical history, past surgical history, allergies, medications, social and family history as documented unless otherwise noted below. For Physician Assistant/ Nurse Practitioner cases/documentation I have personally evaluated this patient and have completed at least one if not all key elements of the E/M (history, physical exam, and MDM). Additional findings are as noted. Patient left yesterday before his work-up was completed, patient was worried that he had Covid and states he could not sleep all night worrying about it, Covid came back negative, patient is in no distress, chest clear, heart regular rate and rhythm, no lower extremity pain or swelling on examination.      Erika Seo MD  05/28/21 1373

## 2021-06-02 LAB
CULTURE: NORMAL
Lab: NORMAL
SPECIMEN DESCRIPTION: NORMAL

## 2021-11-28 ENCOUNTER — HOSPITAL ENCOUNTER (EMERGENCY)
Age: 61
Discharge: HOME | End: 2021-11-29
Attending: EMERGENCY MEDICINE
Payer: MEDICARE

## 2021-11-28 ENCOUNTER — APPOINTMENT (OUTPATIENT)
Dept: GENERAL RADIOLOGY | Age: 61
End: 2021-11-28
Payer: MEDICARE

## 2021-11-28 DIAGNOSIS — R51.9 NONINTRACTABLE HEADACHE, UNSPECIFIED CHRONICITY PATTERN, UNSPECIFIED HEADACHE TYPE: Primary | ICD-10-CM

## 2021-11-28 LAB
ABSOLUTE EOS #: 0.06 K/UL (ref 0–0.4)
ABSOLUTE IMMATURE GRANULOCYTE: 0 K/UL (ref 0–0.3)
ABSOLUTE LYMPH #: 2.65 K/UL (ref 1–4.8)
ABSOLUTE MONO #: 0.5 K/UL (ref 0.1–0.8)
ANION GAP SERPL CALCULATED.3IONS-SCNC: 11 MMOL/L (ref 9–17)
BASOPHILS # BLD: 1 % (ref 0–2)
BASOPHILS ABSOLUTE: 0.06 K/UL (ref 0–0.2)
BUN BLDV-MCNC: 17 MG/DL (ref 8–23)
BUN/CREAT BLD: ABNORMAL (ref 9–20)
CALCIUM SERPL-MCNC: 9 MG/DL (ref 8.6–10.4)
CHLORIDE BLD-SCNC: 106 MMOL/L (ref 98–107)
CO2: 23 MMOL/L (ref 20–31)
CREAT SERPL-MCNC: 0.81 MG/DL (ref 0.7–1.2)
DIFFERENTIAL TYPE: ABNORMAL
EOSINOPHILS RELATIVE PERCENT: 1 % (ref 1–4)
GFR AFRICAN AMERICAN: >60 ML/MIN
GFR NON-AFRICAN AMERICAN: >60 ML/MIN
GFR SERPL CREATININE-BSD FRML MDRD: ABNORMAL ML/MIN/{1.73_M2}
GFR SERPL CREATININE-BSD FRML MDRD: ABNORMAL ML/MIN/{1.73_M2}
GLUCOSE BLD-MCNC: 139 MG/DL (ref 70–99)
HCT VFR BLD CALC: 38.9 % (ref 40.7–50.3)
HEMOGLOBIN: 13.3 G/DL (ref 13–17)
IMMATURE GRANULOCYTES: 0 %
LYMPHOCYTES # BLD: 42 % (ref 24–44)
MCH RBC QN AUTO: 32.1 PG (ref 25.2–33.5)
MCHC RBC AUTO-ENTMCNC: 34.2 G/DL (ref 28.4–34.8)
MCV RBC AUTO: 94 FL (ref 82.6–102.9)
MONOCYTES # BLD: 8 % (ref 1–7)
MORPHOLOGY: NORMAL
NRBC AUTOMATED: 0 PER 100 WBC
PDW BLD-RTO: 13.7 % (ref 11.8–14.4)
PLATELET # BLD: 229 K/UL (ref 138–453)
PLATELET ESTIMATE: ABNORMAL
PMV BLD AUTO: 9.4 FL (ref 8.1–13.5)
POTASSIUM SERPL-SCNC: 3.6 MMOL/L (ref 3.7–5.3)
RBC # BLD: 4.14 M/UL (ref 4.21–5.77)
RBC # BLD: ABNORMAL 10*6/UL
SEG NEUTROPHILS: 48 % (ref 36–66)
SEGMENTED NEUTROPHILS ABSOLUTE COUNT: 3.03 K/UL (ref 1.8–7.7)
SODIUM BLD-SCNC: 140 MMOL/L (ref 135–144)
TROPONIN INTERP: NORMAL
TROPONIN T: NORMAL NG/ML
TROPONIN, HIGH SENSITIVITY: <6 NG/L (ref 0–22)
WBC # BLD: 6.3 K/UL (ref 3.5–11.3)
WBC # BLD: ABNORMAL 10*3/UL

## 2021-11-28 PROCEDURE — 80048 BASIC METABOLIC PNL TOTAL CA: CPT

## 2021-11-28 PROCEDURE — 99282 EMERGENCY DEPT VISIT SF MDM: CPT

## 2021-11-28 PROCEDURE — 85025 COMPLETE CBC W/AUTO DIFF WBC: CPT

## 2021-11-28 PROCEDURE — 93005 ELECTROCARDIOGRAM TRACING: CPT | Performed by: STUDENT IN AN ORGANIZED HEALTH CARE EDUCATION/TRAINING PROGRAM

## 2021-11-28 PROCEDURE — 96374 THER/PROPH/DIAG INJ IV PUSH: CPT

## 2021-11-28 PROCEDURE — 84484 ASSAY OF TROPONIN QUANT: CPT

## 2021-11-28 PROCEDURE — 6360000002 HC RX W HCPCS: Performed by: STUDENT IN AN ORGANIZED HEALTH CARE EDUCATION/TRAINING PROGRAM

## 2021-11-28 PROCEDURE — 71046 X-RAY EXAM CHEST 2 VIEWS: CPT

## 2021-11-28 PROCEDURE — 96375 TX/PRO/DX INJ NEW DRUG ADDON: CPT

## 2021-11-28 RX ORDER — DIPHENHYDRAMINE HYDROCHLORIDE 50 MG/ML
25 INJECTION INTRAMUSCULAR; INTRAVENOUS ONCE
Status: COMPLETED | OUTPATIENT
Start: 2021-11-28 | End: 2021-11-28

## 2021-11-28 RX ORDER — PROCHLORPERAZINE EDISYLATE 5 MG/ML
10 INJECTION INTRAMUSCULAR; INTRAVENOUS ONCE
Status: COMPLETED | OUTPATIENT
Start: 2021-11-28 | End: 2021-11-28

## 2021-11-28 RX ADMIN — PROCHLORPERAZINE EDISYLATE 10 MG: 5 INJECTION INTRAMUSCULAR; INTRAVENOUS at 22:22

## 2021-11-28 RX ADMIN — DIPHENHYDRAMINE HYDROCHLORIDE 25 MG: 50 INJECTION, SOLUTION INTRAMUSCULAR; INTRAVENOUS at 22:23

## 2021-11-28 ASSESSMENT — PAIN DESCRIPTION - LOCATION: LOCATION: HEAD

## 2021-11-28 ASSESSMENT — PAIN SCALES - GENERAL: PAINLEVEL_OUTOF10: 8

## 2021-11-29 ENCOUNTER — APPOINTMENT (OUTPATIENT)
Dept: CT IMAGING | Age: 61
End: 2021-11-29
Payer: MEDICARE

## 2021-11-29 VITALS
TEMPERATURE: 98.6 F | SYSTOLIC BLOOD PRESSURE: 142 MMHG | RESPIRATION RATE: 16 BRPM | OXYGEN SATURATION: 93 % | DIASTOLIC BLOOD PRESSURE: 96 MMHG | HEART RATE: 55 BPM

## 2021-11-29 PROCEDURE — 70498 CT ANGIOGRAPHY NECK: CPT

## 2021-11-29 PROCEDURE — 6360000004 HC RX CONTRAST MEDICATION: Performed by: STUDENT IN AN ORGANIZED HEALTH CARE EDUCATION/TRAINING PROGRAM

## 2021-11-29 PROCEDURE — 70450 CT HEAD/BRAIN W/O DYE: CPT

## 2021-11-29 RX ADMIN — IOPAMIDOL 90 ML: 755 INJECTION, SOLUTION INTRAVENOUS at 00:05

## 2021-11-29 NOTE — ED NOTES
Patient brought in by EMS for headache and right shoulder pain starting Friday evening after receiving the COVID vaccine. Patient concerned for headache due to hx of stroke. Patient currently alert and oriented x 3, resp e/u, skin PWD, resting in bed NADN. Pain 8/10. Reports last taking tylenol approx 1400 today.      Shagufta Bermudez RN  11/28/21 6574 TRANSFER - OUT REPORT: 
 
Verbal report given to Lou POSADAS(name) on Inderjit Guo  being transferred to [facility]) for routine progression of care Report consisted of patients Situation, Background, Assessment and  
Recommendations(SBAR). Information from the following report(s) SBAR, Kardex, OR Summary, Intake/Output, MAR and Cardiac Rhythm NSR was reviewed with the receiving nurse. Opportunity for questions and clarification was provided. Patient transported with: 
 O2 @ 3l/nc liters Registered Nurse

## 2021-11-29 NOTE — ED PROVIDER NOTES
[facility]  Emergency Department  Emergency Medicine Resident Sign-out     Care of Sander Michaels was assumed from Dr. Rom Pompa and is being seen for Headache  . The patient's initial evaluation and plan have been discussed with the prior provider who initially evaluated the patient. EMERGENCY DEPARTMENT COURSE / MEDICAL DECISION MAKING:       MEDICATIONS GIVEN:  Orders Placed This Encounter   Medications    prochlorperazine (COMPAZINE) injection 10 mg    diphenhydrAMINE (BENADRYL) injection 25 mg    iopamidol (ISOVUE-370) 76 % injection 90 mL       LABS / RADIOLOGY:     Labs Reviewed   CBC WITH AUTO DIFFERENTIAL - Abnormal; Notable for the following components:       Result Value    RBC 4.14 (*)     Hematocrit 38.9 (*)     Monocytes 8 (*)     All other components within normal limits   BASIC METABOLIC PANEL - Abnormal; Notable for the following components:    Glucose 139 (*)     Potassium 3.6 (*)     All other components within normal limits   TROPONIN       XR CHEST (2 VW)    Result Date: 11/28/2021  EXAMINATION: TWO XRAY VIEWS OF THE CHEST 11/28/2021 10:46 pm COMPARISON: 05/27/2021 HISTORY: ORDERING SYSTEM PROVIDED HISTORY: chest pain, cough TECHNOLOGIST PROVIDED HISTORY: chest pain, cough Reason for Exam: chest pain,cough,headache FINDINGS: Mild cardiomegaly. Cardiomediastinal silhouette otherwise within normal limits. Lungs and costophrenic sulci are clear. No pneumothorax or subdiaphragmatic free air. No acute osseous abnormality identified. No radiographic evidence of acute cardiopulmonary disease. RECENT VITALS:     Temp: 98.6 °F (37 °C),  Pulse: 55, Resp: 16, BP: (!) 142/96, SpO2: 93 %      This patient is a 57 y.o. Male with previous medical history concerning for ruptured aneurysm who presents with concerns for intermittent headache, different from his typical headache which started on Friday.   Patient also endorses chest pain with radiation to the right hand, right wrist.  Patient denies a history of coronary artery disease, stent placement. Patient with this pain at outside intensity, states is located in the posterior aspect of his head radiating down to the neck. Patient denies fever, chills, lightheadedness, dizziness. Patient is able to ambulate without difficulty. Patient has no focal neurological deficit on physical examination as per previous resident. Patient is currently pending CT head, CTA head and neck, cardiac work-up has been grossly negative for acute pathology. Plan to revisit disposition patient based on results of imaging. CT imaging negative. Discussed with patient with regards to follow-up with primary care doctor and for strict return precautions. Patient verbalized agreement understanding. Some discharge. ED Course as of 11/29/21 0118 Mon Nov 29, 2021   0117 [facility] CTA H/N neg [EM]      ED Course User Index  [EM] Harvey Sen MD       OUTSTANDING TASKS / RECOMMENDATIONS:    1. CT imaging  2. Dispo     FINAL IMPRESSION:     1.  Nonintractable headache, unspecified chronicity pattern, unspecified headache type        DISPOSITION:         DISPOSITION:  [x]  Discharge   []  Transfer -    []  Admission -     []  Against Medical Advice   []  Eloped   FOLLOW-UP: Charlie Moran MD  [address] [facility] [address]    Schedule an appointment as soon as possible for a visit   For follow up    [facility]  [address] [facility] [address] [phone]  Go to   As needed     [address]: New Prescriptions    No medications on file          Harvey Sen MD  Emergency Medicine Resident  [facility]      Harvey Sen MD  Resident  11/29/21 1658

## 2021-11-29 NOTE — ED NOTES
Bed: 11  Expected date:   Expected time:   Means of arrival:   Comments:  500 Ari Ritchie RN  11/28/21 2253

## 2021-11-29 NOTE — ED PROVIDER NOTES
[facility]     Emergency Department     Faculty Note/ Attestation      Pt Name: Cinthia Camarena                                       MRN: [id number]  [place] 1960  Date of evaluation: 11/28/2021    Patients PCP:    Anai Deutsch MD      Attestation  I performed a history and physical examination of the patient and discussed management with the resident. I reviewed the residents note and agree with the documented findings and plan of care. Any areas of disagreement are noted on the chart. I was personally present for the key portions of any procedures. I have documented in the chart those procedures where I was not present during the key portions. I have reviewed the emergency nurses triage note. I agree with the chief complaint, past medical history, past surgical history, allergies, medications, social and family history as documented unless otherwise noted below. For Physician Assistant/ Nurse Practitioner cases/documentation I have personally evaluated this patient and have completed at least one if not all key elements of the E/M (history, physical exam, and MDM). Additional findings are as noted.       Initial Screens:             Vitals:    Vitals:    11/28/21 2153   BP: (!) 170/99   Pulse: 55   Resp: 16   Temp: 98.6 °F (37 °C)   SpO2: 96%       CHIEF COMPLAINT       Chief Complaint   Patient presents with    Headache             DIAGNOSTIC RESULTS             RADIOLOGY:   CT HEAD WO CONTRAST    (Results Pending)   CTA HEAD NECK W CONTRAST    (Results Pending)   XR CHEST (2 VW)    (Results Pending)         LABS:  Labs Reviewed   CBC WITH AUTO DIFFERENTIAL   TROPONIN   BASIC METABOLIC PANEL         EMERGENCY DEPARTMENT COURSE:     -------------------------  BP: (!) 170/99, Temp: 98.6 °F (37 °C), Pulse: 55, Resp: 16      Comments    56 yo with HA, prior aneurysm rupture, had COVID vacc 2 days ago  CP, arm pain, COVID shot no L    Plan for cardiac labs, CT/CTA, reassess    12:54 AM EST  CTs have been performed however have not been read.   If no obvious intracranial pathology and symptoms are improving will likely discharge    S/o to Dr. Ovi Nugent    (Please note that portions of this note were completed with a voice recognition program.  Efforts were made to edit the dictations but occasionally words are mis-transcribed.)      Derik Romero MD,, MD  Attending Emergency Physician         Derik Romero MD  11/29/21 0252

## 2021-11-29 NOTE — ED PROVIDER NOTES
[facility] ED  Emergency Department Encounter  Emergency Medicine Resident     Pt Name: Lisa Nugent  MRN: [id number]  [place] 1960  Date of evaluation: 11/28/21  PCP:  Linda Horton MD    CHIEF COMPLAINT       Chief Complaint   Patient presents with    Headache       [facility]  (Location/Symptom, Timing/Onset, Context/Setting, Quality, Duration, Modifying [place].)      Lisa Nugent is a 57 y.o. male with a history of degenerative disc disease, kidney stones, migraine headache, hepatitis C, ruptured aneurysm for which he was seen in [place] chest pain who presents with concerns for headache and shoulder pain that started on Friday. Patient also endorses chest pain with history of gout to his right hand, right wrist.  Patient denies history of any previous coronary artery disease, myocardial infarction, is no stents in place. Patient states that his headache which he puts at 8 out of 10 in intensity is located posterior aspect of his head radiating down to the neck. Patient denies recent fever, viral prodrome including runny nose, sore throat, chills, denies lightheadedness or dizziness, patient is able to ambulate without any difficulty. Patient has no focal weakness or neurological deficit. Patient states that he has had similar symptoms when an aneurysm burst.    PAST MEDICAL / SURGICAL / SOCIAL / FAMILY HISTORY      has a past medical history of Chronic kidney disease, Degenerative disk disease, Depression, Hematuria, Paranoia ([address] and Schizophrenia ([facility] [address] has a past surgical history that includes hernia repair (3/2010); Cystoscopy (12/18/12); Ureteroscopy; and Lithotripsy (12/18/12).      Social History     Socioeconomic History    Marital status:      Spouse name: Not on file    Number of children: Not on file    Years of education: Not on file    Highest education level: Not on file   Occupational History    Not on file Cetylpyridinium Chloride (CEPACOL MOUTHWASH/GARGLE) 0.05 % LIQD Take 1 lozenge by mouth 4 times daily as needed (Cough) 5/28/21   SPRING Marino MD   oxyCODONE-acetaminophen (PERCOCET) 5-325 MG per tablet Take 1 tablet by mouth every 8 hours as needed for Pain 12/31/15   Mateo Morgan, DO   traZODone (DESYREL) 100 MG tablet Take 1 tablet by mouth Daily. 11/14/14   Juan Pablo Alanis, DO   QUEtiapine (SEROQUEL XR) 50 MG XR tablet Take 3 tablets by mouth daily. 11/14/14   Juan Pablo Alanis, DO   sertraline (ZOLOFT) 50 MG tablet Take 1 tablet by mouth daily. 11/14/14   Juan Pablo Alanis, DO   albuterol (PROVENTIL HFA) 108 (90 BASE) MCG/ACT inhaler Inhale 1-2 puffs into the lungs every 4 hours as needed for Wheezing. 4/8/14   Viviana Plant, DO   butalbital-acetaminophen-caffeine (FIORICET) per tablet Take 1 tablet by mouth every 6 hours as needed for Headaches. 9/20/13   Cornelio Salas MD       REVIEW OFSYSTEMS    (2-9 systems for level 4, 10 or more for level 5)      Review of Systems   Constitutional: Negative for chills, diaphoresis, fatigue and fever. HENT: Negative for rhinorrhea, sore throat, tinnitus and trouble swallowing. Eyes: Negative for visual disturbance. Respiratory: Negative for cough, chest tightness, shortness of breath and wheezing. Cardiovascular: Positive for chest pain. Negative for leg swelling. Gastrointestinal: Negative for abdominal distention, abdominal pain, constipation, diarrhea, nausea and vomiting. Endocrine: Negative for polyuria. Genitourinary: Negative for dysuria, flank pain and frequency. Musculoskeletal: Negative for arthralgias, back pain, joint swelling and myalgias. Neurological: Positive for headaches. Negative for dizziness, tremors, seizures, weakness, light-headedness and numbness.        PHYSICAL EXAM   (up to 7 for level 4, 8 or more forlevel 5)      INITIAL VITALS:   ED Triage Vitals   BP Temp Temp src Pulse Resp SpO2 Height Weight   -- -- -- -- -- -- -- -- Physical Exam  Constitutional:       General: He is not in acute distress. Appearance: He is not ill-appearing. HENT:      Head: Normocephalic and atraumatic. Right Ear: External ear normal.      Left Ear: External ear normal.   Eyes:      Extraocular Movements: Extraocular movements intact. Cardiovascular:      Rate and Rhythm: Normal rate and regular rhythm. Pulmonary:      Effort: Pulmonary effort is normal.   Abdominal:      General: Abdomen is flat. Musculoskeletal:         General: No deformity or signs of injury. Skin:     General: Skin is warm. Capillary Refill: Capillary refill takes less than 2 seconds. Neurological:      Mental Status: He is oriented to person, place, and time. Mental status is at baseline. Motor: No weakness. Gait: Gait normal.   Psychiatric:         Mood and Affect: Mood normal.         DIFFERENTIAL  DIAGNOSIS     PLAN (LABS / IMAGING / EKG):  Orders Placed This Encounter   Procedures    CT HEAD WO CONTRAST    CTA HEAD NECK W CONTRAST    XR CHEST (2 VW)    CBC WITH AUTO DIFFERENTIAL    Troponin    BASIC METABOLIC PANEL       MEDICATIONS ORDERED:  Orders Placed This Encounter   Medications    prochlorperazine (COMPAZINE) injection 10 mg    diphenhydrAMINE (BENADRYL) injection 25 mg    iopamidol (ISOVUE-370) 76 % injection 90 mL       DDX: Migraine, headache, aneurysmal rupture, intracranial hemorrhage, angina, myocardial infarction    Initial MDM/Plan/ED COURSE:    57 y.o. male who presents with concerns of posterior headache of 3 days duration, patient does have a history of previous aneurysmal rupture, plan to get CT abdomen to assess for acute pathology, CTA head neck and reassess for potential aneurysm. Patient also has chest pain rating to the right arm, plan to get troponin, CBC, BMP, plan to get just a single troponin if initial 1 is less than 6 patient can safely be restratified with a heart score.       ED Course as of 12/01/21 [address] [place] [address]  Nov 29, 2021   0117 [facility] CTA H/N neg [EM]      ED Course User Index  [EM] Ariel Kruger MD   Patient cannot to Dr. Kassy Chapman with CT head, CTA head neck pending, if negative patient potentially can safely be discharged home. Patient treated with Compazine, Benadryl with improvement of symptoms.:     DIAGNOSTIC RESULTS / EMERGENCYDEPARTMENT COURSE / MDM     LABS:  Labs Reviewed   CBC WITH AUTO DIFFERENTIAL - Abnormal; Notable for the following components:       Result Value    RBC 4.14 (*)     Hematocrit 38.9 (*)     Monocytes 8 (*)     All other components within normal limits   BASIC METABOLIC PANEL - Abnormal; Notable for the following components:    Glucose 139 (*)     Potassium 3.6 (*)     All other components within normal limits   TROPONIN           No results found. PROCEDURES:  None    CONSULTS:  None    CRITICAL CARE:  Please see attending note    FINAL IMPRESSION      1.  Nonintractable headache, unspecified chronicity pattern, unspecified headache type          DISPOSITION / PLAN     DISPOSITION Decision To Discharge 11/29/2021 01:17:37 AM      PATIENT REFERRED TO:  Liane Santiago MD  [address] [facility] [facility]    Schedule an appointment as soon as possible for a visit   For follow up    [facility] ED  [address] [facility] [address]  [phone]  Go to   As needed      DISCHARGE MEDICATIONS:  Discharge Medication List as of 11/29/2021  1:17 AM          Jillian Baltazar MD  Emergency Medicine Resident    (Please note that portions of this note were completed with a voice recognition program.Efforts were made to edit the dictations but occasionally words are mis-transcribed.)        Jillian Baltazar MD  Resident  12/01/21 [id number]

## 2021-12-01 LAB
EKG ATRIAL RATE: 57 BPM
EKG P AXIS: 62 DEGREES
EKG P-R INTERVAL: 132 MS
EKG Q-T INTERVAL: 416 MS
EKG QRS DURATION: 82 MS
EKG QTC CALCULATION (BAZETT): 404 MS
EKG R AXIS: 7 DEGREES
EKG T AXIS: 51 DEGREES
EKG VENTRICULAR RATE: 57 BPM

## 2021-12-01 PROCEDURE — 93010 ELECTROCARDIOGRAM REPORT: CPT | Performed by: INTERNAL MEDICINE

## 2021-12-01 ASSESSMENT — ENCOUNTER SYMPTOMS
SHORTNESS OF BREATH: 0
NAUSEA: 0
BACK PAIN: 0
TROUBLE SWALLOWING: 0
RHINORRHEA: 0
SORE THROAT: 0
CHEST TIGHTNESS: 0
CONSTIPATION: 0
DIARRHEA: 0
WHEEZING: 0
VOMITING: 0
ABDOMINAL DISTENTION: 0
COUGH: 0
ABDOMINAL PAIN: 0

## 2022-01-08 ENCOUNTER — APPOINTMENT (OUTPATIENT)
Dept: GENERAL RADIOLOGY | Age: 62
End: 2022-01-08
Payer: MEDICARE

## 2022-01-08 ENCOUNTER — HOSPITAL ENCOUNTER (EMERGENCY)
Age: 62
Discharge: HOME | End: 2022-01-08
Attending: EMERGENCY MEDICINE
Payer: MEDICARE

## 2022-01-08 VITALS
HEART RATE: 76 BPM | WEIGHT: 160 LBS | TEMPERATURE: 97.3 F | RESPIRATION RATE: 18 BRPM | BODY MASS INDEX: 25.11 KG/M2 | SYSTOLIC BLOOD PRESSURE: 147 MMHG | DIASTOLIC BLOOD PRESSURE: 100 MMHG | HEIGHT: 67 IN | OXYGEN SATURATION: 98 %

## 2022-01-08 DIAGNOSIS — M79.601 RIGHT ARM PAIN: Primary | ICD-10-CM

## 2022-01-08 PROCEDURE — 99283 EMERGENCY DEPT VISIT LOW MDM: CPT

## 2022-01-08 RX ORDER — IBUPROFEN 400 MG/1
400 TABLET ORAL ONCE
Status: DISCONTINUED | OUTPATIENT
Start: 2022-01-08 | End: 2022-01-08 | Stop reason: HOSPADM

## 2022-01-08 ASSESSMENT — ENCOUNTER SYMPTOMS
NAUSEA: 0
BACK PAIN: 0
ABDOMINAL PAIN: 0
SHORTNESS OF BREATH: 0
DIARRHEA: 0
VOMITING: 0
CONSTIPATION: 0

## 2022-01-08 ASSESSMENT — PAIN DESCRIPTION - PAIN TYPE: TYPE: ACUTE PAIN

## 2022-01-08 ASSESSMENT — PAIN SCALES - GENERAL: PAINLEVEL_OUTOF10: 8

## 2022-01-08 ASSESSMENT — PAIN DESCRIPTION - LOCATION: LOCATION: ARM

## 2022-01-08 ASSESSMENT — PAIN DESCRIPTION - ORIENTATION: ORIENTATION: RIGHT

## 2022-01-08 NOTE — ED PROVIDER NOTES
[address] [place] [address] ED  Emergency Department Encounter  EmergencyMedicine Resident     Pt Name:James Weber  MRN: [id number]  [place] 1960  Date of evaluation: 1/8/22  PCP:  Davy Higgins MD    This patient was evaluated in the Emergency Department for symptoms described in the history of present illness. The patient was evaluated in the context of the global COVID-19 pandemic, which necessitated consideration that the patient might be at risk for infection with the SARS-CoV-2 virus that causes COVID-19. Institutional protocols and algorithms that pertain to the evaluation of patients at risk for COVID-19 are in a state of rapid change based on information released by regulatory bodies including the CDC and federal and state organizations. These policies and algorithms were followed during the patient's care in the ED. CHIEF COMPLAINT       Chief Complaint   Patient presents with    Arm Pain       HISTORY OF PRESENT ILLNESS  (Location/Symptom, Timing/Onset, Context/Setting, Quality, Duration, Modifying Factors, Severity.)      Valentino Hall is a 57 y.o. male who presents with right elbow pain. Patient states his pain has been present for 6 months. Patient states that 6 months ago he was walking his dog on a leash when the dog pulled him and his arm. He has been having pain ever since. States that it acutely worsened over the past couple of days. Try taking 1 extra strength Tylenol last night. This did not help him. Denies fever/chills, cough, shortness of breath, chest pain, nausea/vomiting, numbness or tingling, focal weakness. PAST MEDICAL / SURGICAL / SOCIAL / FAMILY HISTORY      has a past medical history of Chronic kidney disease, Degenerative disk disease, Depression, Hematuria, Paranoia ([address] and Schizophrenia ([address] has a past surgical history that includes hernia repair (3/2010); Cystoscopy (12/18/12); Ureteroscopy; and Lithotripsy (12/18/12).     Social History Socioeconomic History    Marital status:      Spouse name: Not on file    Number of children: Not on file    Years of education: Not on file    Highest education level: Not on file   Occupational History    Not on file   Tobacco Use    Smoking status: Current Every Day Smoker     Packs/day: 1.00     Types: Cigarettes    Smokeless tobacco: Never Used    Tobacco comment: 2-3 cigarettes a day   Substance and Sexual Activity    Alcohol use: Yes     Alcohol/week: 21.0 standard drinks     Types: 21 Cans of beer per week    Drug use: No    Sexual activity: Not on file   Other Topics Concern    Not on file   Social History Narrative    Not on file     Social Determinants of Health     Financial Resource Strain:     Difficulty of Paying Living Expenses: Not on file   Food Insecurity:     Worried About Running Out of Food in the Last Year: Not on file    Phong of Food in the Last Year: Not on file   Transportation Needs:     Lack of Transportation (Medical): Not on file    Lack of Transportation (Non-Medical):  Not on file   Physical Activity:     Days of Exercise per Week: Not on file    Minutes of Exercise per Session: Not on file   Stress:     Feeling of Stress : Not on file   Social Connections:     Frequency of Communication with Friends and Family: Not on file    Frequency of Social Gatherings with Friends and Family: Not on file    Attends Synagogue Services: Not on file    Active Member of [address] [place] [address] or Organizations: Not on file    Attends Club or Organization Meetings: Not on file    Marital Status: Not on file   Intimate Partner Violence:     Fear of Current or Ex-Partner: Not on file    Emotionally Abused: Not on file    Physically Abused: Not on file    Sexually Abused: Not on file   Housing Stability:     Unable to Pay for Housing in the Last Year: Not on file    Number of [place] in the Last Year: Not on file    Unstable Housing in the Last Year: Not on file History reviewed. No pertinent family history. Allergies:    Reglan [metoclopramide] and Pcn [penicillins]    Home Medications:  Prior to Admission medications    Medication Sig Start Date End Date Taking? Authorizing Provider   Cetylpyridinium Chloride (CEPACOL MOUTHWASH/GARGLE) 0.05 % LIQD Take 1 lozenge by mouth 4 times daily as needed (Cough) 5/28/21   SPRING Basurto MD   oxyCODONE-acetaminophen (PERCOCET) 5-325 MG per tablet Take 1 tablet by mouth every 8 hours as needed for Pain 12/31/15   Jorden Keyes, DO   traZODone (DESYREL) 100 MG tablet Take 1 tablet by mouth Daily. 11/14/14   Phil Castleman, DO   QUEtiapine (SEROQUEL XR) 50 MG XR tablet Take 3 tablets by mouth daily. 11/14/14   Phil Castleman, DO   sertraline (ZOLOFT) 50 MG tablet Take 1 tablet by mouth daily. 11/14/14   Phil Castleman, DO   albuterol (PROVENTIL HFA) 108 (90 BASE) MCG/ACT inhaler Inhale 1-2 puffs into the lungs every 4 hours as needed for Wheezing. 4/8/14   Margarito Grey, DO   butalbital-acetaminophen-caffeine (FIORICET) per tablet Take 1 tablet by mouth every 6 hours as needed for Headaches. 9/20/13   Susanne Williamson MD       REVIEW OF SYSTEMS    (2-9 systems for level 4, 10 or more for level 5)    Review of Systems   Constitutional: Negative for chills and fever. HENT: Negative for congestion. Eyes: Negative for visual disturbance. Respiratory: Negative for shortness of breath. Cardiovascular: Negative for chest pain. Gastrointestinal: Negative for abdominal pain, constipation, diarrhea, nausea and vomiting. Genitourinary: Negative for difficulty urinating and dysuria. Musculoskeletal: Negative for back pain. Skin: Negative for wound. Neurological: Negative for weakness, numbness and headaches.          PHYSICAL EXAM   (up to 7 for level 4, 8 or more for level 5)    INITIAL VITALS:   BP (!) 147/100   Pulse 76   Temp 97.3 °F (36.3 °C) (Oral)   Resp 18   Ht 5' 7\" (1.702 m)   Wt 160 lb (72.6 kg)   SpO2 98% BMI 25.06 kg/m²   I have reviewed the triage vital signs. Const: Well nourished, well developed, appears stated age  Eyes: PERRL, no conjunctival injection  HENT: NCAT, Neck supple without meningismus   CV: RRR, Warm, well-perfused extremities  RESP: CTAB, Unlabored respiratory effort  GI: soft, non-tender, non-distended, no masses  MSK: Full passive and active range of motion in right upper extremity, full muscle strength, full sensation, neurovascularly intact distal to the elbow. No gross deformities appreciated  Skin: Warm, dry. No rashes  Neuro: Alert, CNs II-XII grossly intact. Sensation and motor function of extremities grossly intact. Psych: Appropriate mood and affect. DIFFERENTIAL  DIAGNOSIS   DDX:  Strain/sprain/fracture    Initial MDM:  HPI: Previously healthy 67-year-old male presents with 6 months of right elbow pain  Vitals: Within normal limits  PE: See above  Plan: Right elbow x-ray. Ibuprofen. PLAN (LABS / IMAGING / EKG):  Orders Placed This Encounter   Procedures    XR ELBOW RIGHT (MIN 3 VIEWS)       MEDICATIONS ORDERED:  Orders Placed This Encounter   Medications    ibuprofen (ADVIL;MOTRIN) tablet 400 mg         DIAGNOSTIC RESULTS / EMERGENCY DEPARTMENT COURSE / MDM   LAB RESULTS:  No results found for this visit on 01/08/22. RADIOLOGY:  Awaiting right elbow x-ray  CONSULTS:  None      MDM/EMERGENCY DEPARTMENT COURSE:  ED Course as of 01/08/22 1511   Sat Jan 08, 2022   1508 Patient eloped from emergency department. Patient was not given any discharge instructions. Was not able to discuss patient's need for evaluation prior to him leaving. [AR]      ED Course User Index  [AR] Krystyna Iqbal,        CRITICAL CARE:  Please see Attending Note    FINAL IMPRESSION      1. Right arm pain          DISPOSITION / PLAN     DISPOSITION Eloped - Left Before Treatment Complete 01/08/2022 03:09:27 PM     PATIENT REFERRED TO:  No follow-up provider specified.     DISCHARGE MEDICATIONS:  Discharge Medication List as of 1/8/2022  3:10 PM          Brittany العراقي DO  Emergency Medicine Resident    (Please note that portions of this note were completed with a voice recognition program.  Efforts were made to edit the dictations but occasionally words are mis-transcribed.)       Brittany العراقي DO  Resident  01/08/22 1142

## 2022-01-08 NOTE — ED NOTES
Pt walked out and announced that he needed to [employer] of tools for work\" and left. Writer unsure if pt plans to return.       Ana Maldonado LPN  94/25/28 6812

## 2022-01-08 NOTE — ED NOTES
Pt presents to the ED c/o right arm pain and weakness x 2 months that has worsened. Pt states the weakness is a new onset today and he is experiencing difficulty moving it. Pt denies injury, CP, SOB, nausea/vomiting. Pt A&O x 4, does not appear in acute distress, RR even and unlabored, resting comfortably on stretcher with eyes open and call light in reach. Dr. Livia Padgett at bedside to assess pt.  Initial assessment performed by physician, David Mckeon will carry out initial orders/tasks and reassess pt.       Anitra Ha LPN  00/47/49 7140

## 2022-01-08 NOTE — ED PROVIDER NOTES
[facility] [address] ED     Emergency Department     Faculty Attestation        I performed a history and physical examination of the patient and discussed management with the resident. I reviewed the residents note and agree with the documented findings and plan of care. Any areas of disagreement are noted on the chart. I was personally present for the key portions of any procedures. I have documented in the chart those procedures where I was not present during the key portions. I have reviewed the emergency nurses triage note. I agree with the chief complaint, past medical history, past surgical history, allergies, medications, social and family history as documented unless otherwise noted below. For mid-level providers such as nurse practitioners as well as physicians assistants:    I have personally seen and evaluated the patient. I find the patient's history and physical exam are consistent with NP/PA documentation. I agree with the care provided, treatment rendered, disposition, & follow-up plan. Additional findings are as noted. Vital Signs: BP (!) 147/100   Pulse 76   Temp 97.3 °F (36.3 °C) (Oral)   Resp 18   Ht 5' 7\" (1.702 m)   Wt 160 lb (72.6 kg)   SpO2 98%   BMI 25.06 kg/m²   PCP:  Massimo Lofton MD    Pertinent Comments:     Elbow pain this been present for about 6 months after his dog pulled his arm while holding the leash. He has been range of motion there is no bruising ecchymosis or deformity. There is no erythema warmth or signs suggest infection.       Critical Care  None          Evert Echeverria MD    Attending Emergency Medicine Physician              Justin Kidd MD  01/08/22 1419

## 2022-01-21 ENCOUNTER — HOSPITAL ENCOUNTER (EMERGENCY)
Age: 62
Discharge: ANOTHER HOSPITAL | End: 2022-01-21
Attending: EMERGENCY MEDICINE
Payer: MEDICARE

## 2022-01-21 ENCOUNTER — APPOINTMENT (OUTPATIENT)
Dept: GENERAL RADIOLOGY | Age: 62
End: 2022-01-21
Payer: MEDICARE

## 2022-01-21 VITALS
HEART RATE: 66 BPM | HEIGHT: 67 IN | TEMPERATURE: 98.2 F | DIASTOLIC BLOOD PRESSURE: 101 MMHG | OXYGEN SATURATION: 100 % | RESPIRATION RATE: 14 BRPM | BODY MASS INDEX: 25.9 KG/M2 | WEIGHT: 165 LBS | SYSTOLIC BLOOD PRESSURE: 159 MMHG

## 2022-01-21 DIAGNOSIS — R51.9 NONINTRACTABLE HEADACHE, UNSPECIFIED CHRONICITY PATTERN, UNSPECIFIED HEADACHE TYPE: Primary | ICD-10-CM

## 2022-01-21 LAB
ABSOLUTE EOS #: 0.04 K/UL (ref 0–0.44)
ABSOLUTE IMMATURE GRANULOCYTE: <0.03 K/UL (ref 0–0.3)
ABSOLUTE LYMPH #: 1.89 K/UL (ref 1.1–3.7)
ABSOLUTE MONO #: 0.54 K/UL (ref 0.1–1.2)
ANION GAP SERPL CALCULATED.3IONS-SCNC: 11 MMOL/L (ref 9–17)
BASOPHILS # BLD: 1 % (ref 0–2)
BASOPHILS ABSOLUTE: 0.03 K/UL (ref 0–0.2)
BUN BLDV-MCNC: 10 MG/DL (ref 8–23)
BUN/CREAT BLD: NORMAL (ref 9–20)
CALCIUM SERPL-MCNC: 9.5 MG/DL (ref 8.6–10.4)
CARBOXYHEMOGLOBIN: 3.7 % (ref 0–5)
CHLORIDE BLD-SCNC: 104 MMOL/L (ref 98–107)
CO2: 24 MMOL/L (ref 20–31)
CREAT SERPL-MCNC: 0.99 MG/DL (ref 0.7–1.2)
D-DIMER QUANTITATIVE: 0.33 MG/L FEU
DIFFERENTIAL TYPE: NORMAL
EKG ATRIAL RATE: 63 BPM
EKG P AXIS: 71 DEGREES
EKG P-R INTERVAL: 160 MS
EKG Q-T INTERVAL: 376 MS
EKG QRS DURATION: 88 MS
EKG QTC CALCULATION (BAZETT): 384 MS
EKG R AXIS: -7 DEGREES
EKG T AXIS: 28 DEGREES
EKG VENTRICULAR RATE: 63 BPM
EOSINOPHILS RELATIVE PERCENT: 1 % (ref 1–4)
GFR AFRICAN AMERICAN: >60 ML/MIN
GFR NON-AFRICAN AMERICAN: >60 ML/MIN
GFR SERPL CREATININE-BSD FRML MDRD: NORMAL ML/MIN/{1.73_M2}
GFR SERPL CREATININE-BSD FRML MDRD: NORMAL ML/MIN/{1.73_M2}
GLUCOSE BLD-MCNC: 92 MG/DL (ref 70–99)
HCT VFR BLD CALC: 43.2 % (ref 40.7–50.3)
HEMOGLOBIN: 14.4 G/DL (ref 13–17)
IMMATURE GRANULOCYTES: 0 %
LYMPHOCYTES # BLD: 31 % (ref 24–43)
MCH RBC QN AUTO: 30.8 PG (ref 25.2–33.5)
MCHC RBC AUTO-ENTMCNC: 33.3 G/DL (ref 28.4–34.8)
MCV RBC AUTO: 92.5 FL (ref 82.6–102.9)
MONOCYTES # BLD: 9 % (ref 3–12)
NRBC AUTOMATED: 0 PER 100 WBC
PDW BLD-RTO: 12.4 % (ref 11.8–14.4)
PLATELET # BLD: 270 K/UL (ref 138–453)
PLATELET ESTIMATE: NORMAL
PMV BLD AUTO: 9.8 FL (ref 8.1–13.5)
POTASSIUM SERPL-SCNC: 4 MMOL/L (ref 3.7–5.3)
RBC # BLD: 4.67 M/UL (ref 4.21–5.77)
RBC # BLD: NORMAL 10*6/UL
SARS-COV-2, RAPID: NOT DETECTED
SEG NEUTROPHILS: 58 % (ref 36–65)
SEGMENTED NEUTROPHILS ABSOLUTE COUNT: 3.58 K/UL (ref 1.5–8.1)
SODIUM BLD-SCNC: 139 MMOL/L (ref 135–144)
SPECIMEN DESCRIPTION: NORMAL
TROPONIN INTERP: NORMAL
TROPONIN T: NORMAL NG/ML
TROPONIN, HIGH SENSITIVITY: 6 NG/L (ref 0–22)
WBC # BLD: 6.1 K/UL (ref 3.5–11.3)
WBC # BLD: NORMAL 10*3/UL

## 2022-01-21 PROCEDURE — 85379 FIBRIN DEGRADATION QUANT: CPT

## 2022-01-21 PROCEDURE — 93005 ELECTROCARDIOGRAM TRACING: CPT | Performed by: STUDENT IN AN ORGANIZED HEALTH CARE EDUCATION/TRAINING PROGRAM

## 2022-01-21 PROCEDURE — 96375 TX/PRO/DX INJ NEW DRUG ADDON: CPT

## 2022-01-21 PROCEDURE — 93010 ELECTROCARDIOGRAM REPORT: CPT | Performed by: INTERNAL MEDICINE

## 2022-01-21 PROCEDURE — 2580000003 HC RX 258: Performed by: STUDENT IN AN ORGANIZED HEALTH CARE EDUCATION/TRAINING PROGRAM

## 2022-01-21 PROCEDURE — 6360000002 HC RX W HCPCS: Performed by: STUDENT IN AN ORGANIZED HEALTH CARE EDUCATION/TRAINING PROGRAM

## 2022-01-21 PROCEDURE — 82375 ASSAY CARBOXYHB QUANT: CPT

## 2022-01-21 PROCEDURE — 80048 BASIC METABOLIC PNL TOTAL CA: CPT

## 2022-01-21 PROCEDURE — 71045 X-RAY EXAM CHEST 1 VIEW: CPT

## 2022-01-21 PROCEDURE — 96365 THER/PROPH/DIAG IV INF INIT: CPT

## 2022-01-21 PROCEDURE — 99285 EMERGENCY DEPT VISIT HI MDM: CPT

## 2022-01-21 PROCEDURE — 6370000000 HC RX 637 (ALT 250 FOR IP): Performed by: STUDENT IN AN ORGANIZED HEALTH CARE EDUCATION/TRAINING PROGRAM

## 2022-01-21 PROCEDURE — 85025 COMPLETE CBC W/AUTO DIFF WBC: CPT

## 2022-01-21 PROCEDURE — 87635 SARS-COV-2 COVID-19 AMP PRB: CPT

## 2022-01-21 PROCEDURE — 84484 ASSAY OF TROPONIN QUANT: CPT

## 2022-01-21 RX ORDER — KETOROLAC TROMETHAMINE 30 MG/ML
30 INJECTION, SOLUTION INTRAMUSCULAR; INTRAVENOUS ONCE
Status: COMPLETED | OUTPATIENT
Start: 2022-01-21 | End: 2022-01-21

## 2022-01-21 RX ORDER — MAGNESIUM SULFATE IN WATER 40 MG/ML
2000 INJECTION, SOLUTION INTRAVENOUS ONCE
Status: COMPLETED | OUTPATIENT
Start: 2022-01-21 | End: 2022-01-21

## 2022-01-21 RX ORDER — SODIUM CHLORIDE, SODIUM LACTATE, POTASSIUM CHLORIDE, AND CALCIUM CHLORIDE .6; .31; .03; .02 G/100ML; G/100ML; G/100ML; G/100ML
1000 INJECTION, SOLUTION INTRAVENOUS ONCE
Status: COMPLETED | OUTPATIENT
Start: 2022-01-21 | End: 2022-01-21

## 2022-01-21 RX ORDER — PROCHLORPERAZINE EDISYLATE 5 MG/ML
10 INJECTION INTRAMUSCULAR; INTRAVENOUS ONCE
Status: COMPLETED | OUTPATIENT
Start: 2022-01-21 | End: 2022-01-21

## 2022-01-21 RX ORDER — DIPHENHYDRAMINE HCL 25 MG
25 TABLET ORAL ONCE
Status: COMPLETED | OUTPATIENT
Start: 2022-01-21 | End: 2022-01-21

## 2022-01-21 RX ADMIN — DIPHENHYDRAMINE HCL 25 MG: 25 TABLET ORAL at 10:09

## 2022-01-21 RX ADMIN — KETOROLAC TROMETHAMINE 30 MG: 30 INJECTION, SOLUTION INTRAMUSCULAR at 09:17

## 2022-01-21 RX ADMIN — MAGNESIUM SULFATE 2000 MG: 2 INJECTION INTRAVENOUS at 09:14

## 2022-01-21 RX ADMIN — SODIUM CHLORIDE, POTASSIUM CHLORIDE, SODIUM LACTATE AND CALCIUM CHLORIDE 1000 ML: 600; 310; 30; 20 INJECTION, SOLUTION INTRAVENOUS at 09:12

## 2022-01-21 RX ADMIN — PROCHLORPERAZINE EDISYLATE 10 MG: 5 INJECTION INTRAMUSCULAR; INTRAVENOUS at 10:09

## 2022-01-21 ASSESSMENT — PAIN DESCRIPTION - LOCATION: LOCATION: HEAD;CHEST

## 2022-01-21 ASSESSMENT — PAIN DESCRIPTION - DESCRIPTORS: DESCRIPTORS: CONSTANT

## 2022-01-21 ASSESSMENT — PAIN SCALES - GENERAL
PAINLEVEL_OUTOF10: 10
PAINLEVEL_OUTOF10: 10

## 2022-01-21 ASSESSMENT — PAIN DESCRIPTION - PAIN TYPE: TYPE: ACUTE PAIN

## 2022-01-21 NOTE — ED PROVIDER NOTES
[facility] ED  Emergency Department Encounter  Emergency Medicine Resident     Pt Name:James Wilson  MRN: [id number]  Birthdate 1960  Date of evaluation: 1/21/22  PCP:  MD Destiny Arredondo       Chief Complaint   Patient presents with    Chest Pain    Headache       HISTORY OF PRESENT ILLNESS  (Location/Symptom, Timing/Onset, Context/Setting, Quality, Duration, Modifying Factors, Severity.)      Yamile Brock is a 57 y.o. male who presents with a multitude of vague, nonspecific, and tangential complaints. Primarily being a chronic headache, frontal, non radiating. Hx of similar in the past, has not taken anything for sx mgmt. Concerned for ruptured aneurism, apparent hx of similar in past. CTH/CTA head and neck two months ago w/o aneurism however patient does not believe these findings and would like another scan. Also with substernal, non radiating, nonexertional CP. Also R arm weakness while exercising today, \"did not feel as strong as normal\". No numbness, tingling, weakness. Denies taking any medications though is prescribed several mood stabilizers. No fevers chills nausea vomiting shortness of breath abdominal pain constipation or diarrhea. PAST MEDICAL / SURGICAL / SOCIAL / FAMILY HISTORY      has a past medical history of Chronic kidney disease, Degenerative disk disease, Depression, Hematuria, Paranoia ([address] and Schizophrenia ([facility] [address] has a past surgical history that includes hernia repair (3/2010); Cystoscopy (12/18/12); Ureteroscopy; and Lithotripsy (12/18/12).     Social History     Socioeconomic History    Marital status:      Spouse name: Not on file    Number of children: Not on file    Years of education: Not on file    Highest education level: Not on file   Occupational History    Not on file   Tobacco Use    Smoking status: Current Every Day Smoker     Packs/day: 1.00     Types: Cigarettes    Smokeless tobacco: Never Used   Velazquez Tobacco comment: 2-3 cigarettes a day   Substance and Sexual Activity    Alcohol use: Yes     Alcohol/week: 21.0 standard drinks     Types: 21 Cans of beer per week    Drug use: No    Sexual activity: Not on file   Other Topics Concern    Not on file   Social History Narrative    Not on file     Social Determinants of Health     Financial Resource Strain:     Difficulty of Paying Living Expenses: Not on file   Food Insecurity:     Worried About Running Out of Food in the Last Year: Not on file    Phong of Food in the Last Year: Not on file   Transportation Needs:     Lack of Transportation (Medical): Not on file    Lack of Transportation (Non-Medical): Not on file   Physical Activity:     Days of Exercise per Week: Not on file    Minutes of Exercise per Session: Not on file   Stress:     Feeling of Stress : Not on file   Social Connections:     Frequency of Communication with Friends and Family: Not on file    Frequency of Social Gatherings with Friends and Family: Not on file    Attends Anabaptism Services: Not on file    Active Member of [address] [place] [address] or Organizations: Not on file    Attends Club or Organization Meetings: Not on file    Marital Status: Not on file   Intimate Partner Violence:     Fear of Current or Ex-Partner: Not on file    Emotionally Abused: Not on file    Physically Abused: Not on file    Sexually Abused: Not on file   Housing Stability:     Unable to Pay for Housing in the Last Year: Not on file    Number of [place] in the Last Year: Not on file    Unstable Housing in the Last Year: Not on file       History reviewed. No pertinent family history. Allergies:  Reglan [metoclopramide] and Pcn [penicillins]    Home Medications:  Prior to Admission medications    Medication Sig Start Date End Date Taking?  Authorizing Provider   Cetylpyridinium Chloride (CEPACOL MOUTHWASH/GARGLE) 0.05 % LIQD Take 1 lozenge by mouth 4 times daily as needed (Cough) 5/28/21   SPRING Nye MD oxyCODONE-acetaminophen (PERCOCET) 5-325 MG per tablet Take 1 tablet by mouth every 8 hours as needed for Pain 12/31/15   Lucia Gonzales, DO   traZODone (DESYREL) 100 MG tablet Take 1 tablet by mouth Daily. 11/14/14   Mallory James, DO   QUEtiapine (SEROQUEL XR) 50 MG XR tablet Take 3 tablets by mouth daily. 11/14/14   Mallory James, DO   sertraline (ZOLOFT) 50 MG tablet Take 1 tablet by mouth daily. 11/14/14   Mallory James, DO   albuterol (PROVENTIL HFA) 108 (90 BASE) MCG/ACT inhaler Inhale 1-2 puffs into the lungs every 4 hours as needed for Wheezing. 4/8/14   Rivear Chavez, DO   butalbital-acetaminophen-caffeine (FIORICET) per tablet Take 1 tablet by mouth every 6 hours as needed for Headaches. 9/20/13   Spring Almonte MD       REVIEW OF SYSTEMS    (2-9 systems for level 4, 10 or more for level 5)      Review of Systems   Constitutional: Negative for fever. HENT: Negative for sore throat. Eyes: Negative for visual disturbance. Respiratory: Negative for shortness of breath. Cardiovascular: Positive for chest pain. Gastrointestinal: Negative for abdominal pain, constipation, diarrhea, nausea and vomiting. Genitourinary: Negative for decreased urine volume. Musculoskeletal: Negative for arthralgias, myalgias, neck pain and neck stiffness. Skin: Negative for wound. Neurological: Positive for weakness and headaches. Negative for light-headedness. Psychiatric/Behavioral: Negative for confusion. PHYSICAL EXAM   (up to 7 for level 4, 8 or more for level 5)      INITIAL VITALS:   BP (!) 159/101   Pulse 66   Temp 98.2 °F (36.8 °C) (Oral)   Resp 14   Ht 5' 7\" (1.702 m)   Wt 165 lb (74.8 kg)   SpO2 100%   BMI 25.84 kg/m²     Physical Exam  Vitals and nursing note reviewed. Constitutional:       Appearance: Normal appearance. He is well-developed. HENT:      Head: Normocephalic and atraumatic.       Right Ear: External ear normal.      Left Ear: External ear normal.      Nose: Nose normal.   Neck:      Trachea: Trachea normal. No tracheal deviation. Cardiovascular:      Rate and Rhythm: Normal rate and regular rhythm. Pulses: Normal pulses. Heart sounds: Normal heart sounds. Pulmonary:      Effort: Pulmonary effort is normal. No respiratory distress. Abdominal:      Palpations: Abdomen is soft. Tenderness: There is no abdominal tenderness. Musculoskeletal:         General: No deformity. Normal range of motion. Cervical back: Normal range of motion. No rigidity. Skin:     General: Skin is warm and dry. Capillary Refill: Capillary refill takes less than 2 seconds. Neurological:      General: No focal deficit present. Mental Status: He is alert and oriented to person, place, and time. Cranial Nerves: No cranial nerve deficit. Sensory: No sensory deficit. Motor: No weakness. Coordination: Coordination normal.      Gait: Gait normal.      Comments: Congenital strabismus   Psychiatric:         Mood and Affect: Affect is blunt. Speech: Speech is tangential.         Behavior: Behavior is agitated. Behavior is not hyperactive. Thought Content: Thought content does not include homicidal or suicidal ideation. Thought content does not include homicidal or suicidal plan.          DIFFERENTIAL  DIAGNOSIS     PLAN (LABS / IMAGING / EKG):  Orders Placed This Encounter   Procedures    COVID-19, Rapid    XR CHEST PORTABLE    Carboxyhemoglobin    CBC Auto Differential    Basic Metabolic Panel w/ Reflex to MG    Troponin    D-Dimer, Quantitative    EKG 12 Lead    Insert peripheral IV       MEDICATIONS ORDERED:  Orders Placed This Encounter   Medications    ketorolac (TORADOL) injection 30 mg    magnesium sulfate 2000 mg in 50 mL IVPB premix    lactated ringers bolus    diphenhydrAMINE (BENADRYL) tablet 25 mg    prochlorperazine (COMPAZINE) injection 10 mg       DDX: ACS versus pneumonia versus carbon monoxide poisoning versus dissection versus SARS-CoV-2 versus mood disorder versus other    DIAGNOSTIC RESULTS / EMERGENCY DEPARTMENT COURSE / MDM     LABS:  Results for orders placed or performed during the hospital encounter of 01/21/22   COVID-19, Rapid    Specimen: Nasopharyngeal Swab   Result Value Ref Range    Specimen Description . NASOPHARYNGEAL SWAB     SARS-CoV-2, Rapid Not Detected Not Detected   Carboxyhemoglobin   Result Value Ref Range    Carboxyhemoglobin 3.7 0 - 5 %   CBC Auto Differential   Result Value Ref Range    WBC 6.1 3.5 - 11.3 k/uL    RBC 4.67 4.21 - 5.77 m/uL    Hemoglobin 14.4 13.0 - 17.0 g/dL    Hematocrit 43.2 40.7 - 50.3 %    MCV 92.5 82.6 - 102.9 fL    MCH 30.8 25.2 - 33.5 pg    MCHC 33.3 28.4 - 34.8 g/dL    RDW 12.4 11.8 - 14.4 %    Platelets 464 277 - 682 k/uL    MPV 9.8 8.1 - 13.5 fL    NRBC Automated 0.0 0.0 per 100 WBC    Differential Type NOT REPORTED     Seg Neutrophils 58 36 - 65 %    Lymphocytes 31 24 - 43 %    Monocytes 9 3 - 12 %    Eosinophils % 1 1 - 4 %    Basophils 1 0 - 2 %    Immature Granulocytes 0 0 %    Segs Absolute 3.58 1.50 - 8.10 k/uL    Absolute Lymph # 1.89 1.10 - 3.70 k/uL    Absolute Mono # 0.54 0.10 - 1.20 k/uL    Absolute Eos # 0.04 0.00 - 0.44 k/uL    Basophils Absolute 0.03 0.00 - 0.20 k/uL    Absolute Immature Granulocyte <0.03 0.00 - 0.30 k/uL    WBC Morphology NOT REPORTED     RBC Morphology NOT REPORTED     Platelet Estimate NOT REPORTED    Basic Metabolic Panel w/ Reflex to MG   Result Value Ref Range    Glucose 92 70 - 99 mg/dL    BUN 10 8 - 23 mg/dL    CREATININE 0.99 0.70 - 1.20 mg/dL    Bun/Cre Ratio NOT REPORTED 9 - 20    Calcium 9.5 8.6 - 10.4 mg/dL    Sodium 139 135 - 144 mmol/L    Potassium 4.0 3.7 - 5.3 mmol/L    Chloride 104 98 - 107 mmol/L    CO2 24 20 - 31 mmol/L    Anion Gap 11 9 - 17 mmol/L    GFR Non-African American >60 >60 mL/min    GFR African American >60 >60 mL/min    GFR Comment          GFR Staging NOT REPORTED    Troponin   Result Value Ref Range Troponin, High Sensitivity 6 0 - 22 ng/L    Troponin T NOT REPORTED <0.03 ng/mL    Troponin Interp NOT REPORTED    D-Dimer, Quantitative   Result Value Ref Range    D-Dimer, Quant 0.33 mg/L FEU   EKG 12 Lead   Result Value Ref Range    Ventricular Rate 63 BPM    Atrial Rate 63 BPM    P-R Interval 160 ms    QRS Duration 88 ms    Q-T Interval 376 ms    QTc Calculation (Bazett) 384 ms    P Axis 71 degrees    R Axis -7 degrees    T Axis 28 degrees         RADIOLOGY:  XR CHEST PORTABLE    Result Date: 1/21/2022  EXAMINATION: ONE XRAY VIEW OF THE CHEST 1/21/2022 9:31 am COMPARISON: 11/28/2021 HISTORY: ORDERING SYSTEM PROVIDED HISTORY: CP TECHNOLOGIST PROVIDED HISTORY: CP FINDINGS: The cardiomediastinal silhouette is unchanged in appearance. There is no consolidation, pneumothorax, or evidence of edema. No effusion is appreciated. The osseous structures are unchanged in appearance. Unchanged appearance of the chest without acute airspace disease identified. EKG  EKG Interpretation    Interpreted by me    Rhythm: normal sinus   Rate: normal  Axis: normal  Ectopy: none  Conduction: normal  ST Segments: no acute change  T Waves: no acute change  Q Waves: none    Clinical Impression: Nonspecific EKG without significant change when compared with EKG dated 11/28/2021    All EKG's are interpreted by the Emergency Department Physician who either signs or Co-signs this chart in the absence of a cardiologist.    EMERGENCY DEPARTMENT COURSE:  Patient found seated upright in bed, no acute distress, not ill or toxic appearing. Vital signs stable. Patient with tangential thought process, difficulty in keeping patient focused on chief complaint as primary concern continuously changing. Regarding patient's concern for aneurysm low suspicion as he has a negative CT head and CTA 2 months ago. ACS work-up initiated as well as treatment for migraine with migraine cocktail.   At some point in the patient's work-up he eloped, unable to discuss with patient risks and benefits of leaving and completion of work-up. PROCEDURES:  None    CONSULTS:  None    CRITICAL CARE:  None    FINAL IMPRESSION      1. Nonintractable headache, unspecified chronicity pattern, unspecified headache type          DISPOSITION / PLAN     DISPOSITION Eloped - Left Before Treatment Complete 01/21/2022 10:18:18 AM      PATIENT REFERRED TO:  No follow-up provider specified.     DISCHARGE MEDICATIONS:  Discharge Medication List as of 1/21/2022 10:13 AM          Sun Morin MD  Emergency Medicine Resident    (Please note that portions of this note were completed with a voice recognition program.  Efforts were made to edit the dictations but occasionally words are mis-transcribed.)       Sun Morin MD  Resident  01/22/22 0401

## 2022-01-21 NOTE — ED NOTES
The following labs labeled with pt sticker and tubed to lab:     [x] Blue     [x] Lavender   [] on ice  [x] Green/yellow  [x] Green/black [] on ice  [x] Yellow  [] Red  [] Pink      [x] COVID-19 swab    [x] Rapid  [] PCR  [] Flu swab  [] Peds Viral Panel     [] Urine Sample  [] Pelvic Cultures  [] Blood Cultures          Sara Alejandre RN  01/21/22 3821

## 2022-01-21 NOTE — ED NOTES
Pt present to the ED with C/O having chest pain in the   midsternal area that is non radiating. Pt stated this has been an ongoing issue. Pt stated the pain started this am and gradually increased. Pt stated he felt dizzy today, and experienced a huge headache. Pt stated when he had experienced the pain his right arm locked up. Pt stated he has been having night sweats. Pt was placed on full cardiac monitor. EKG obtained. Will continue to monitor and reassess.                 Venita Gosselin, RN  01/21/22 [id number]

## 2022-01-21 NOTE — ED PROVIDER NOTES
[facility]     Emergency Department     Faculty Attestation    I performed a history and physical examination of the patient and discussed management with the resident. I reviewed the resident´s note and agree with the documented findings and plan of care. Any areas of disagreement are noted on the chart. I was personally present for the key portions of any procedures. I have documented in the chart those procedures where I was not present during the key portions. I have reviewed the emergency nurses triage note. I agree with the chief complaint, past medical history, past surgical history, allergies, medications, social and family history as documented unless otherwise noted below. For Physician Assistant/ Nurse Practitioner cases/documentation I have personally evaluated this patient and have completed at least one if not all key elements of the E/M (history, physical exam, and MDM). Additional findings are as noted. Chest clear,  Heart exam normal , no pain or swelling on examination of the lower extremities , equal pulses both wrists , trachea midline. Abdomen is nontender without pulsatile mass or bruit. Chest pain does not radiate to the back , and the pain is not pleuritic. Patient appears comfortable in no distress, skin is warm and dry. No focal neurodeficits, patient has amblyopia since birth.            EKG Interpretation    Interpreted by emergency department physician    Rhythm: normal sinus   Rate: normal/63  Axis: normal  Ectopy: none  Conduction: normal  ST Segments: no acute change  T Waves: no acute change  Q Waves: Septal    Clinical Impression: Abnormal EKG without significant changes from EKG done November 28, 2021    Marcelina Vance MD FACEP  Attending Physician              Kathleen Lemos MD  01/21/22 [address] [facility] Melchor Beyer MD  01/21/22 0528

## 2022-01-22 ASSESSMENT — ENCOUNTER SYMPTOMS
ABDOMINAL PAIN: 0
DIARRHEA: 0
SHORTNESS OF BREATH: 0
NAUSEA: 0
CONSTIPATION: 0
SORE THROAT: 0
VOMITING: 0

## 2022-03-18 PROBLEM — R27.8 DYSMETRIA: Status: ACTIVE | Noted: 2017-09-10

## 2022-03-18 PROBLEM — E87.6 HYPOKALEMIA: Status: ACTIVE | Noted: 2017-09-10

## 2022-03-19 PROBLEM — I10 ESSENTIAL HYPERTENSION: Status: ACTIVE | Noted: 2017-09-10

## 2022-03-19 PROBLEM — N39.0 COMPLICATED UTI (URINARY TRACT INFECTION): Status: ACTIVE | Noted: 2017-06-25

## 2022-03-19 PROBLEM — F31.9 BIPOLAR 1 DISORDER, DEPRESSED (HCC): Status: ACTIVE | Noted: 2017-09-10

## 2022-03-19 PROBLEM — F20.9 SCHIZOPHRENIA (HCC): Status: ACTIVE | Noted: 2017-09-10

## 2022-03-19 PROBLEM — R29.898 RIGHT HAND WEAKNESS: Status: ACTIVE | Noted: 2017-06-25

## 2022-03-19 PROBLEM — B18.2 HEP C W/O COMA, CHRONIC (HCC): Status: ACTIVE | Noted: 2017-09-10

## 2025-02-20 ENCOUNTER — APPOINTMENT (OUTPATIENT)
Dept: GENERAL RADIOLOGY | Age: 65
DRG: 193 | End: 2025-02-20
Payer: MEDICARE

## 2025-02-20 ENCOUNTER — HOSPITAL ENCOUNTER (INPATIENT)
Age: 65
LOS: 4 days | Discharge: LEFT BEFORE DISCHARGE | DRG: 193 | End: 2025-02-24
Attending: EMERGENCY MEDICINE | Admitting: INTERNAL MEDICINE
Payer: MEDICARE

## 2025-02-20 DIAGNOSIS — I35.1 NONRHEUMATIC AORTIC VALVE INSUFFICIENCY: ICD-10-CM

## 2025-02-20 DIAGNOSIS — I42.9 CARDIOMYOPATHY, UNSPECIFIED TYPE (HCC): ICD-10-CM

## 2025-02-20 DIAGNOSIS — I50.9 CONGESTIVE HEART FAILURE, UNSPECIFIED HF CHRONICITY, UNSPECIFIED HEART FAILURE TYPE (HCC): ICD-10-CM

## 2025-02-20 DIAGNOSIS — I50.9 ACUTE ON CHRONIC CONGESTIVE HEART FAILURE, UNSPECIFIED HEART FAILURE TYPE (HCC): Primary | ICD-10-CM

## 2025-02-20 DIAGNOSIS — J18.9 MULTIFOCAL PNEUMONIA: ICD-10-CM

## 2025-02-20 DIAGNOSIS — R06.02 SHORTNESS OF BREATH: ICD-10-CM

## 2025-02-20 LAB
ANION GAP SERPL CALCULATED.3IONS-SCNC: 14 MMOL/L (ref 9–16)
ANTI-XA UNFRAC HEPARIN: <0.1 IU/L
B PARAP IS1001 DNA NPH QL NAA+NON-PROBE: NOT DETECTED
B PERT DNA SPEC QL NAA+PROBE: NOT DETECTED
BASOPHILS # BLD: <0.03 K/UL (ref 0–0.2)
BASOPHILS NFR BLD: 0 % (ref 0–2)
BNP SERPL-MCNC: ABNORMAL PG/ML (ref 0–125)
BUN SERPL-MCNC: 26 MG/DL (ref 8–23)
C PNEUM DNA NPH QL NAA+NON-PROBE: NOT DETECTED
CALCIUM SERPL-MCNC: 9.3 MG/DL (ref 8.6–10.4)
CHLORIDE SERPL-SCNC: 102 MMOL/L (ref 98–107)
CO2 SERPL-SCNC: 23 MMOL/L (ref 20–31)
CREAT SERPL-MCNC: 1.4 MG/DL (ref 0.7–1.2)
EOSINOPHIL # BLD: <0.03 K/UL (ref 0–0.44)
EOSINOPHILS RELATIVE PERCENT: 0 % (ref 1–4)
ERYTHROCYTE [DISTWIDTH] IN BLOOD BY AUTOMATED COUNT: 14.5 % (ref 11.8–14.4)
FLUAV RNA NPH QL NAA+NON-PROBE: NOT DETECTED
FLUBV RNA NPH QL NAA+NON-PROBE: NOT DETECTED
GFR, ESTIMATED: 56 ML/MIN/1.73M2
GLUCOSE SERPL-MCNC: 122 MG/DL (ref 74–99)
HADV DNA NPH QL NAA+NON-PROBE: NOT DETECTED
HCOV 229E RNA NPH QL NAA+NON-PROBE: NOT DETECTED
HCOV HKU1 RNA NPH QL NAA+NON-PROBE: NOT DETECTED
HCOV NL63 RNA NPH QL NAA+NON-PROBE: NOT DETECTED
HCOV OC43 RNA NPH QL NAA+NON-PROBE: NOT DETECTED
HCT VFR BLD AUTO: 39.4 % (ref 40.7–50.3)
HGB BLD-MCNC: 13.6 G/DL (ref 13–17)
HMPV RNA NPH QL NAA+NON-PROBE: NOT DETECTED
HPIV1 RNA NPH QL NAA+NON-PROBE: NOT DETECTED
HPIV2 RNA NPH QL NAA+NON-PROBE: NOT DETECTED
HPIV3 RNA NPH QL NAA+NON-PROBE: NOT DETECTED
HPIV4 RNA NPH QL NAA+NON-PROBE: NOT DETECTED
IMM GRANULOCYTES # BLD AUTO: 0.04 K/UL (ref 0–0.3)
IMM GRANULOCYTES NFR BLD: 0 %
INR PPP: 1.1
LACTIC ACID, WHOLE BLOOD: 2 MMOL/L (ref 0.7–2.1)
LYMPHOCYTES NFR BLD: 2.23 K/UL (ref 1.1–3.7)
LYMPHOCYTES RELATIVE PERCENT: 19 % (ref 24–43)
M PNEUMO DNA NPH QL NAA+NON-PROBE: NOT DETECTED
MCH RBC QN AUTO: 34 PG (ref 25.2–33.5)
MCHC RBC AUTO-ENTMCNC: 34.5 G/DL (ref 28.4–34.8)
MCV RBC AUTO: 98.5 FL (ref 82.6–102.9)
MONOCYTES NFR BLD: 0.98 K/UL (ref 0.1–1.2)
MONOCYTES NFR BLD: 8 % (ref 3–12)
NEUTROPHILS NFR BLD: 73 % (ref 36–65)
NEUTS SEG NFR BLD: 8.74 K/UL (ref 1.5–8.1)
NRBC BLD-RTO: 0 PER 100 WBC
PARTIAL THROMBOPLASTIN TIME: 26.8 SEC (ref 23–36.5)
PLATELET # BLD AUTO: 210 K/UL (ref 138–453)
PMV BLD AUTO: 10.7 FL (ref 8.1–13.5)
POTASSIUM SERPL-SCNC: 4.4 MMOL/L (ref 3.7–5.3)
PROTHROMBIN TIME: 14.2 SEC (ref 11.7–14.9)
RBC # BLD AUTO: 4 M/UL (ref 4.21–5.77)
RBC # BLD: ABNORMAL 10*6/UL
RSV RNA NPH QL NAA+NON-PROBE: NOT DETECTED
RV+EV RNA NPH QL NAA+NON-PROBE: NOT DETECTED
SARS-COV-2 RNA NPH QL NAA+NON-PROBE: NOT DETECTED
SODIUM SERPL-SCNC: 139 MMOL/L (ref 136–145)
SPECIMEN DESCRIPTION: NORMAL
TROPONIN I SERPL HS-MCNC: 167 NG/L (ref 0–22)
TROPONIN I SERPL HS-MCNC: 188 NG/L (ref 0–22)
WBC OTHER # BLD: 12 K/UL (ref 3.5–11.3)

## 2025-02-20 PROCEDURE — 99285 EMERGENCY DEPT VISIT HI MDM: CPT

## 2025-02-20 PROCEDURE — 96375 TX/PRO/DX INJ NEW DRUG ADDON: CPT

## 2025-02-20 PROCEDURE — 85520 HEPARIN ASSAY: CPT

## 2025-02-20 PROCEDURE — 85610 PROTHROMBIN TIME: CPT

## 2025-02-20 PROCEDURE — 83880 ASSAY OF NATRIURETIC PEPTIDE: CPT

## 2025-02-20 PROCEDURE — 6360000002 HC RX W HCPCS

## 2025-02-20 PROCEDURE — 96367 TX/PROPH/DG ADDL SEQ IV INF: CPT

## 2025-02-20 PROCEDURE — 0202U NFCT DS 22 TRGT SARS-COV-2: CPT

## 2025-02-20 PROCEDURE — 94660 CPAP INITIATION&MGMT: CPT

## 2025-02-20 PROCEDURE — 87070 CULTURE OTHR SPECIMN AEROBIC: CPT

## 2025-02-20 PROCEDURE — 84484 ASSAY OF TROPONIN QUANT: CPT

## 2025-02-20 PROCEDURE — 36415 COLL VENOUS BLD VENIPUNCTURE: CPT

## 2025-02-20 PROCEDURE — 87205 SMEAR GRAM STAIN: CPT

## 2025-02-20 PROCEDURE — 71045 X-RAY EXAM CHEST 1 VIEW: CPT

## 2025-02-20 PROCEDURE — 96366 THER/PROPH/DIAG IV INF ADDON: CPT

## 2025-02-20 PROCEDURE — 2060000000 HC ICU INTERMEDIATE R&B

## 2025-02-20 PROCEDURE — 96365 THER/PROPH/DIAG IV INF INIT: CPT

## 2025-02-20 PROCEDURE — 85730 THROMBOPLASTIN TIME PARTIAL: CPT

## 2025-02-20 PROCEDURE — 80048 BASIC METABOLIC PNL TOTAL CA: CPT

## 2025-02-20 PROCEDURE — 2580000003 HC RX 258

## 2025-02-20 PROCEDURE — 85025 COMPLETE CBC W/AUTO DIFF WBC: CPT

## 2025-02-20 PROCEDURE — 87040 BLOOD CULTURE FOR BACTERIA: CPT

## 2025-02-20 PROCEDURE — 83605 ASSAY OF LACTIC ACID: CPT

## 2025-02-20 PROCEDURE — 93005 ELECTROCARDIOGRAM TRACING: CPT

## 2025-02-20 RX ORDER — LORAZEPAM 2 MG/ML
0.5 INJECTION INTRAMUSCULAR EVERY 6 HOURS PRN
Status: DISCONTINUED | OUTPATIENT
Start: 2025-02-20 | End: 2025-02-22

## 2025-02-20 RX ORDER — POTASSIUM CHLORIDE 7.45 MG/ML
10 INJECTION INTRAVENOUS PRN
Status: DISCONTINUED | OUTPATIENT
Start: 2025-02-20 | End: 2025-02-24 | Stop reason: HOSPADM

## 2025-02-20 RX ORDER — FENTANYL CITRATE 50 UG/ML
50 INJECTION, SOLUTION INTRAMUSCULAR; INTRAVENOUS ONCE
Status: COMPLETED | OUTPATIENT
Start: 2025-02-20 | End: 2025-02-20

## 2025-02-20 RX ORDER — LORAZEPAM 1 MG/1
1 TABLET ORAL EVERY 6 HOURS PRN
Status: DISCONTINUED | OUTPATIENT
Start: 2025-02-20 | End: 2025-02-21

## 2025-02-20 RX ORDER — OXAZEPAM 30 MG/1
30 CAPSULE, GELATIN COATED ORAL
COMMUNITY

## 2025-02-20 RX ORDER — MAGNESIUM SULFATE IN WATER 40 MG/ML
2000 INJECTION, SOLUTION INTRAVENOUS ONCE
Status: COMPLETED | OUTPATIENT
Start: 2025-02-20 | End: 2025-02-20

## 2025-02-20 RX ORDER — ACETAMINOPHEN 650 MG/1
650 SUPPOSITORY RECTAL EVERY 6 HOURS PRN
Status: DISCONTINUED | OUTPATIENT
Start: 2025-02-20 | End: 2025-02-24 | Stop reason: HOSPADM

## 2025-02-20 RX ORDER — DIVALPROEX SODIUM 500 MG/1
500 TABLET, DELAYED RELEASE ORAL 3 TIMES DAILY
COMMUNITY

## 2025-02-20 RX ORDER — ONDANSETRON 4 MG/1
4 TABLET, ORALLY DISINTEGRATING ORAL EVERY 8 HOURS PRN
Status: DISCONTINUED | OUTPATIENT
Start: 2025-02-20 | End: 2025-02-24 | Stop reason: HOSPADM

## 2025-02-20 RX ORDER — SODIUM CHLORIDE 0.9 % (FLUSH) 0.9 %
5-40 SYRINGE (ML) INJECTION EVERY 12 HOURS SCHEDULED
Status: DISCONTINUED | OUTPATIENT
Start: 2025-02-20 | End: 2025-02-24 | Stop reason: HOSPADM

## 2025-02-20 RX ORDER — LORAZEPAM 2 MG/ML
1 INJECTION INTRAMUSCULAR ONCE
Status: COMPLETED | OUTPATIENT
Start: 2025-02-20 | End: 2025-02-20

## 2025-02-20 RX ORDER — ACETAMINOPHEN 325 MG/1
650 TABLET ORAL EVERY 6 HOURS PRN
Status: DISCONTINUED | OUTPATIENT
Start: 2025-02-20 | End: 2025-02-24 | Stop reason: HOSPADM

## 2025-02-20 RX ORDER — HEPARIN SODIUM 5000 [USP'U]/ML
5000 INJECTION, SOLUTION INTRAVENOUS; SUBCUTANEOUS EVERY 8 HOURS SCHEDULED
Status: DISCONTINUED | OUTPATIENT
Start: 2025-02-20 | End: 2025-02-24 | Stop reason: HOSPADM

## 2025-02-20 RX ORDER — LISINOPRIL 10 MG/1
10 TABLET ORAL DAILY
COMMUNITY
Start: 2024-07-28

## 2025-02-20 RX ORDER — MAGNESIUM SULFATE IN WATER 40 MG/ML
2000 INJECTION, SOLUTION INTRAVENOUS PRN
Status: DISCONTINUED | OUTPATIENT
Start: 2025-02-20 | End: 2025-02-24 | Stop reason: HOSPADM

## 2025-02-20 RX ORDER — FUROSEMIDE 10 MG/ML
20 INJECTION INTRAMUSCULAR; INTRAVENOUS ONCE
Status: DISCONTINUED | OUTPATIENT
Start: 2025-02-20 | End: 2025-02-20

## 2025-02-20 RX ORDER — FUROSEMIDE 10 MG/ML
20 INJECTION INTRAMUSCULAR; INTRAVENOUS ONCE
Status: COMPLETED | OUTPATIENT
Start: 2025-02-20 | End: 2025-02-21

## 2025-02-20 RX ORDER — TAMSULOSIN HYDROCHLORIDE 0.4 MG/1
0.4 CAPSULE ORAL DAILY
COMMUNITY

## 2025-02-20 RX ORDER — POTASSIUM CHLORIDE 1500 MG/1
40 TABLET, EXTENDED RELEASE ORAL PRN
Status: DISCONTINUED | OUTPATIENT
Start: 2025-02-20 | End: 2025-02-24 | Stop reason: HOSPADM

## 2025-02-20 RX ORDER — POLYETHYLENE GLYCOL 3350 17 G/17G
17 POWDER, FOR SOLUTION ORAL DAILY PRN
Status: DISCONTINUED | OUTPATIENT
Start: 2025-02-20 | End: 2025-02-24 | Stop reason: HOSPADM

## 2025-02-20 RX ORDER — ONDANSETRON 2 MG/ML
4 INJECTION INTRAMUSCULAR; INTRAVENOUS EVERY 6 HOURS PRN
Status: DISCONTINUED | OUTPATIENT
Start: 2025-02-20 | End: 2025-02-24 | Stop reason: HOSPADM

## 2025-02-20 RX ORDER — SODIUM CHLORIDE 9 MG/ML
INJECTION, SOLUTION INTRAVENOUS PRN
Status: DISCONTINUED | OUTPATIENT
Start: 2025-02-20 | End: 2025-02-24 | Stop reason: HOSPADM

## 2025-02-20 RX ORDER — LINEZOLID 2 MG/ML
600 INJECTION, SOLUTION INTRAVENOUS EVERY 12 HOURS
Status: DISCONTINUED | OUTPATIENT
Start: 2025-02-21 | End: 2025-02-23

## 2025-02-20 RX ORDER — SODIUM CHLORIDE 0.9 % (FLUSH) 0.9 %
5-40 SYRINGE (ML) INJECTION PRN
Status: DISCONTINUED | OUTPATIENT
Start: 2025-02-20 | End: 2025-02-24 | Stop reason: HOSPADM

## 2025-02-20 RX ADMIN — Medication 1 MG: at 19:26

## 2025-02-20 RX ADMIN — Medication 0.5 MG: at 20:31

## 2025-02-20 RX ADMIN — MAGNESIUM SULFATE HEPTAHYDRATE 2000 MG: 40 INJECTION, SOLUTION INTRAVENOUS at 16:55

## 2025-02-20 RX ADMIN — FENTANYL CITRATE 50 MCG: 50 INJECTION, SOLUTION INTRAMUSCULAR; INTRAVENOUS at 20:23

## 2025-02-20 RX ADMIN — CEFEPIME 2000 MG: 2 INJECTION, POWDER, FOR SOLUTION INTRAVENOUS at 17:00

## 2025-02-20 RX ADMIN — Medication 1 MG: at 16:56

## 2025-02-20 ASSESSMENT — PAIN DESCRIPTION - LOCATION: LOCATION: CHEST

## 2025-02-20 ASSESSMENT — PAIN - FUNCTIONAL ASSESSMENT: PAIN_FUNCTIONAL_ASSESSMENT: 0-10

## 2025-02-20 ASSESSMENT — PAIN SCALES - GENERAL
PAINLEVEL_OUTOF10: 10
PAINLEVEL_OUTOF10: 0

## 2025-02-20 ASSESSMENT — LIFESTYLE VARIABLES
HOW MANY STANDARD DRINKS CONTAINING ALCOHOL DO YOU HAVE ON A TYPICAL DAY: PATIENT DOES NOT DRINK
HOW OFTEN DO YOU HAVE A DRINK CONTAINING ALCOHOL: NEVER

## 2025-02-20 ASSESSMENT — PAIN DESCRIPTION - DESCRIPTORS: DESCRIPTORS: ACHING

## 2025-02-20 ASSESSMENT — PAIN DESCRIPTION - PAIN TYPE: TYPE: ACUTE PAIN

## 2025-02-20 NOTE — ED PROVIDER NOTES
[facility] EMERGENCY DEPARTMENT     Emergency Department     Faculty Attestation    I performed a history and physical examination of the patient and discussed management with the resident. I reviewed the resident’s note and agree with the documented findings and plan of care. Any areas of disagreement are noted on the chart. I was personally present for the key portions of any procedures. I have documented in the chart those procedures where I was not present during the key portions. I have reviewed the emergency nurses triage note. I agree with the chief complaint, past medical history, past surgical history, allergies, medications, social and family history as documented unless otherwise noted below. For Physician Assistant/ Nurse Practitioner cases/documentation I have personally evaluated this patient and have completed at least one if not all key elements of the E/M (history, physical exam, and MDM). Additional findings are as noted.    Note Started: 3:56 PM EST    Patient here after just leaving AMA from [facility] where he was admitted for shortness of breath pneumonia and reported non-STEMI.  Did have a heart cath yesterday.  He states he left because he was not being well cared for and is always \"done better here.\"  Patient appears ill tachypneic speaking in nearly full sentences.  Lungs rales with rhonchi at the bases heart is tachycardic.  No calf tenderness edema.  Will review transfer workup start respiratory interventions admit    Chart review cardiac cath [facility] yesterday normal coronary arteries x 4.    EKG interpretation: Sinus rhythm 104 normal intervals except a QTc of 504 there is a minimal left axis.  There is LVH with a poor R wave progression.  There is ST elevation only in V4 with T wave inversion in V5    Critical Care     CRITICAL CARE: There was a high probability of clinically significant/life threatening deterioration in this patient's condition which required

## 2025-02-20 NOTE — ED NOTES
ED to inpatient nurses report      Chief Complaint:  Chief Complaint   Patient presents with    Hemoptysis     X5 days    Nasal Congestion     Present to ED from: home    MOA:     LOC: alert and orientated to name, place, date  Mobility: Independent  Oxygen Baseline: %    Current needs required: Bipap    Pending ED orders: none   Present condition: stable    Why did the patient come to the ED? Pt to ED with complaints of hemoptysis and shortness of breath. Pt is also complaining of chest pain and dizziness. Pt states that he left AMA from [facility] today after being admitted for Pneumonia. Pt states that he was requiring Bipap to breathe. Writer called respiratory to bedside. Pt is anxious and stating that it is very hard to breath. Pt placed on cardiac monitor, IV established, EKG done, labs drawn.   What is the plan? Admission for antibiotics for pneumonia   Any procedures or intervention occur? Xray, Labs, Bipap   Any safety concerns?? Fall    Mental Status:  Level of Consciousness: Alert (0)    Psych Assessment:   Psychosocial  Psychosocial (WDL): Exceptions to WDL  Patient Behaviors: (S) Agitated, Anxious  Vital signs   Vitals:    02/20/25 1626 02/20/25 1630 02/20/25 1631 02/20/25 1709   BP:       Pulse: (!) 108 (!) 108  94   Resp:  (!) 31  (!) 31   Temp:   97.7 °F (36.5 °C)    TempSrc:   Oral    SpO2:  98%  95%   Weight:   74.8 kg (165 lb)         Vitals:  Patient Vitals for the past 24 hrs:   BP Temp Temp src Pulse Resp SpO2 Weight   02/20/25 1709 -- -- -- 94 (!) 31 95 % --   02/20/25 1631 -- 97.7 °F (36.5 °C) Oral -- -- -- 74.8 kg (165 lb)   02/20/25 1630 -- -- -- (!) 108 (!) 31 98 % --   02/20/25 1626 -- -- -- (!) 108 -- -- --   02/20/25 1545 (!) 127/94 -- -- -- 20 -- --      Visit Vitals  BP (!) 127/94   Pulse 94   Temp 97.7 °F (36.5 °C) (Oral)   Resp (!) 31   Wt 74.8 kg (165 lb)   SpO2 95%   BMI 25.84 kg/m²        LDAs:   Peripheral IV 02/20/25 Right Antecubital (Active)       Peripheral IV

## 2025-02-20 NOTE — ED PROVIDER NOTES
[facility]  Emergency Department Encounter  Emergency Medicine Resident     Pt Name:James Gabriel  MRN: [id number]  Birthdate 1960  Date of evaluation: 2/20/25  PCP:  Ishan Feliz MD  Note Started: 4:05 PM EST      CHIEF COMPLAINT       Chief Complaint   Patient presents with    Hemoptysis     X5 days    Nasal Congestion       HISTORY OF PRESENT ILLNESS  (Location/Symptom, Timing/Onset, Context/Setting, Quality, Duration, Modifying Factors, Severity.)      James Gabriel is a 64 y.o. male who presents with hemoptysis, nasal congestion, productive cough over the last 5 days.  Patient reports that he was evaluated at [facility] in the early morning hours of 2/19 and was admitted.  He reports that he felt like he had been there for days and was not getting any better.  He states that they were \"experimenting on him\" because they kept trialing new medications and treatments.  Patient states that he continued to feel unwell and therefore decided AMA and presented here because he believes that [place] could help him feel better faster.  [facility]    PAST MEDICAL / SURGICAL / SOCIAL / FAMILY HISTORY      has a past medical history of Chronic kidney disease, Degenerative disk disease, Depression, Hematuria, Paranoia (HCC), and Schizophrenia (HCC).       has a past surgical history that includes hernia repair (3/2010); Cystoscopy (12/18/12); Ureteroscopy; and Lithotripsy (12/18/12).      Social History     Socioeconomic History    Marital status:      Spouse name: Not on file    Number of children: Not on file    Years of education: Not on file    Highest education level: Not on file   Occupational History    Not on file   Tobacco Use    Smoking status: Every Day     Current packs/day: 1.00     Types: Cigarettes    Smokeless tobacco: Never    Tobacco comments:     2-3 cigarettes a day   Substance and Sexual Activity

## 2025-02-20 NOTE — ED NOTES
Pt to ED with complaints of hemoptysis and shortness of breath. Pt is also complaining of chest pain and dizziness. Pt states that he left AMA from [facility] today after being admitted for Pneumonia. Pt states that he was requiring Bipap to breathe. Writer called respiratory to bedside. Pt is anxious and stating that it is very hard to breath. Pt placed on cardiac monitor, IV established, EKG done, labs drawn.

## 2025-02-21 ENCOUNTER — APPOINTMENT (OUTPATIENT)
Age: 65
DRG: 193 | End: 2025-02-21
Payer: MEDICARE

## 2025-02-21 PROBLEM — I50.9 ACUTE ON CHRONIC CONGESTIVE HEART FAILURE (HCC): Status: ACTIVE | Noted: 2025-02-21

## 2025-02-21 LAB
AMPHET UR QL SCN: NEGATIVE
BARBITURATES UR QL SCN: NEGATIVE
BENZODIAZ UR QL: POSITIVE
BODY TEMPERATURE: 37
CANNABINOIDS UR QL SCN: POSITIVE
COCAINE UR QL SCN: NEGATIVE
COHGB MFR BLD: 0.3 % (ref 0–5)
ECHO AO ROOT DIAM: 3.7 CM
ECHO AO ROOT INDEX: 1.9 CM/M2
ECHO AR MAX VEL PISA: 3.7 M/S
ECHO AV AREA PEAK VELOCITY: 2.7 CM2
ECHO AV AREA VTI: 2.9 CM2
ECHO AV AREA/BSA PEAK VELOCITY: 1.4 CM2/M2
ECHO AV AREA/BSA VTI: 1.5 CM2/M2
ECHO AV MEAN GRADIENT: 6 MMHG
ECHO AV MEAN VELOCITY: 1.1 M/S
ECHO AV PEAK GRADIENT: 13 MMHG
ECHO AV PEAK VELOCITY: 1.8 M/S
ECHO AV VELOCITY RATIO: 0.67
ECHO AV VTI: 24.6 CM
ECHO BSA: 1.99 M2
ECHO EST RA PRESSURE: 15 MMHG
ECHO IVC PROX: 2.7 CM
ECHO LA AREA 2C: 21.8 CM2
ECHO LA AREA 4C: 16.9 CM2
ECHO LA DIAMETER INDEX: 2.21 CM/M2
ECHO LA DIAMETER: 4.3 CM
ECHO LA MAJOR AXIS: 4.9 CM
ECHO LA MINOR AXIS: 5.4 CM
ECHO LA TO AORTIC ROOT RATIO: 1.16
ECHO LA VOL BP: 59 ML (ref 18–58)
ECHO LA VOL MOD A2C: 72 ML (ref 18–58)
ECHO LA VOL MOD A4C: 45 ML (ref 18–58)
ECHO LA VOL/BSA BIPLANE: 30 ML/M2 (ref 16–34)
ECHO LA VOLUME INDEX MOD A2C: 37 ML/M2 (ref 16–34)
ECHO LA VOLUME INDEX MOD A4C: 23 ML/M2 (ref 16–34)
ECHO LV EDV A2C: 135 ML
ECHO LV EDV A4C: 148 ML
ECHO LV EDV INDEX A4C: 76 ML/M2
ECHO LV EDV NDEX A2C: 69 ML/M2
ECHO LV EF PHYSICIAN: 25 %
ECHO LV EJECTION FRACTION A2C: 24 %
ECHO LV EJECTION FRACTION A4C: 40 %
ECHO LV EJECTION FRACTION BIPLANE: 36 % (ref 55–100)
ECHO LV ESV A2C: 103 ML
ECHO LV ESV A4C: 89 ML
ECHO LV ESV INDEX A2C: 53 ML/M2
ECHO LV ESV INDEX A4C: 46 ML/M2
ECHO LV INTERNAL DIMENSION DIASTOLE INDEX: 3.23 CM/M2
ECHO LV INTERNAL DIMENSION DIASTOLIC: 6.3 CM (ref 4.2–5.9)
ECHO LV IVSD: 1.1 CM (ref 0.6–1)
ECHO LV MASS 2D: 268.3 G (ref 88–224)
ECHO LV MASS INDEX 2D: 137.6 G/M2 (ref 49–115)
ECHO LV POSTERIOR WALL DIASTOLIC: 0.9 CM (ref 0.6–1)
ECHO LV RELATIVE WALL THICKNESS RATIO: 0.29
ECHO LVOT AREA: 4.2 CM2
ECHO LVOT AV VTI INDEX: 0.7
ECHO LVOT DIAM: 2.3 CM
ECHO LVOT MEAN GRADIENT: 3 MMHG
ECHO LVOT PEAK GRADIENT: 6 MMHG
ECHO LVOT PEAK VELOCITY: 1.2 M/S
ECHO LVOT STROKE VOLUME INDEX: 36.4 ML/M2
ECHO LVOT SV: 71 ML
ECHO LVOT VTI: 17.1 CM
ECHO MV AREA VTI: 1.6 CM2
ECHO MV E VELOCITY: 1.64 M/S
ECHO MV LVOT VTI INDEX: 2.63
ECHO MV MAX VELOCITY: 2 M/S
ECHO MV MEAN GRADIENT: 7 MMHG
ECHO MV MEAN VELOCITY: 1.3 M/S
ECHO MV PEAK GRADIENT: 15 MMHG
ECHO MV VTI: 45 CM
ECHO PV MAX VELOCITY: 0.6 M/S
ECHO PV PEAK GRADIENT: 1 MMHG
ECHO RIGHT VENTRICULAR SYSTOLIC PRESSURE (RVSP): 55 MMHG
ECHO RV BASAL DIMENSION: 4.8 CM
ECHO RV INTERNAL DIMENSION: 3.8 CM
ECHO RV MID DIMENSION: 3.3 CM
ECHO RV TAPSE: 1.9 CM (ref 1.7–?)
ECHO TV PEAK GRADIENT: 48 MMHG
ECHO TV REGURGITANT MAX VELOCITY: 3.16 M/S
ECHO TV REGURGITANT PEAK GRADIENT: 40 MMHG
EKG ATRIAL RATE: 104 BPM
EKG ATRIAL RATE: 89 BPM
EKG P AXIS: 54 DEGREES
EKG P AXIS: 66 DEGREES
EKG P-R INTERVAL: 156 MS
EKG P-R INTERVAL: 168 MS
EKG Q-T INTERVAL: 384 MS
EKG Q-T INTERVAL: 420 MS
EKG QRS DURATION: 82 MS
EKG QRS DURATION: 86 MS
EKG QTC CALCULATION (BAZETT): 504 MS
EKG QTC CALCULATION (BAZETT): 511 MS
EKG R AXIS: -36 DEGREES
EKG R AXIS: -42 DEGREES
EKG T AXIS: -132 DEGREES
EKG T AXIS: -175 DEGREES
EKG VENTRICULAR RATE: 104 BPM
EKG VENTRICULAR RATE: 89 BPM
FENTANYL UR QL: POSITIVE
FIO2 ON VENT: ABNORMAL %
HCO3 VENOUS: 22.4 MMOL/L (ref 24–30)
L PNEUMO1 AG UR QL IA.RAPID: NEGATIVE
LACTIC ACID, WHOLE BLOOD: 2.1 MMOL/L (ref 0.7–2.1)
METHADONE UR QL: NEGATIVE
NEGATIVE BASE EXCESS, VEN: 0.5 MMOL/L (ref 0–2)
O2 SAT, VEN: 91.7 % (ref 60–85)
OPIATES UR QL SCN: POSITIVE
OXYCODONE UR QL SCN: NEGATIVE
PCO2 VENOUS: 31.5 MM HG (ref 39–55)
PCP UR QL SCN: NEGATIVE
PH VENOUS: 7.47 (ref 7.32–7.42)
PO2 VENOUS: 57.2 MM HG (ref 30–50)
TEST INFORMATION: ABNORMAL

## 2025-02-21 PROCEDURE — 93010 ELECTROCARDIOGRAM REPORT: CPT | Performed by: INTERNAL MEDICINE

## 2025-02-21 PROCEDURE — 6360000002 HC RX W HCPCS

## 2025-02-21 PROCEDURE — 2060000000 HC ICU INTERMEDIATE R&B

## 2025-02-21 PROCEDURE — 94761 N-INVAS EAR/PLS OXIMETRY MLT: CPT

## 2025-02-21 PROCEDURE — 2500000003 HC RX 250 WO HCPCS

## 2025-02-21 PROCEDURE — 93306 TTE W/DOPPLER COMPLETE: CPT

## 2025-02-21 PROCEDURE — 80307 DRUG TEST PRSMV CHEM ANLYZR: CPT

## 2025-02-21 PROCEDURE — 6370000000 HC RX 637 (ALT 250 FOR IP)

## 2025-02-21 PROCEDURE — 36415 COLL VENOUS BLD VENIPUNCTURE: CPT

## 2025-02-21 PROCEDURE — 82805 BLOOD GASES W/O2 SATURATION: CPT

## 2025-02-21 PROCEDURE — 2700000000 HC OXYGEN THERAPY PER DAY

## 2025-02-21 PROCEDURE — 87449 NOS EACH ORGANISM AG IA: CPT

## 2025-02-21 PROCEDURE — 94660 CPAP INITIATION&MGMT: CPT

## 2025-02-21 PROCEDURE — 83605 ASSAY OF LACTIC ACID: CPT

## 2025-02-21 PROCEDURE — 93306 TTE W/DOPPLER COMPLETE: CPT | Performed by: INTERNAL MEDICINE

## 2025-02-21 PROCEDURE — 2580000003 HC RX 258

## 2025-02-21 PROCEDURE — 99223 1ST HOSP IP/OBS HIGH 75: CPT | Performed by: INTERNAL MEDICINE

## 2025-02-21 RX ORDER — CODEINE PHOSPHATE AND GUAIFENESIN 10; 100 MG/5ML; MG/5ML
5 SOLUTION ORAL EVERY 4 HOURS PRN
Status: DISCONTINUED | OUTPATIENT
Start: 2025-02-21 | End: 2025-02-24 | Stop reason: HOSPADM

## 2025-02-21 RX ORDER — FUROSEMIDE 10 MG/ML
40 INJECTION INTRAMUSCULAR; INTRAVENOUS DAILY
Status: DISCONTINUED | OUTPATIENT
Start: 2025-02-21 | End: 2025-02-22

## 2025-02-21 RX ORDER — FOLIC ACID 1 MG/1
1 TABLET ORAL DAILY
Status: DISCONTINUED | OUTPATIENT
Start: 2025-02-21 | End: 2025-02-24 | Stop reason: HOSPADM

## 2025-02-21 RX ORDER — OXYCODONE HYDROCHLORIDE 5 MG/1
5 TABLET ORAL ONCE
Status: COMPLETED | OUTPATIENT
Start: 2025-02-21 | End: 2025-02-21

## 2025-02-21 RX ORDER — FUROSEMIDE 10 MG/ML
20 INJECTION INTRAMUSCULAR; INTRAVENOUS ONCE
Status: DISCONTINUED | OUTPATIENT
Start: 2025-02-21 | End: 2025-02-21

## 2025-02-21 RX ADMIN — CEFEPIME 2000 MG: 2 INJECTION, POWDER, FOR SOLUTION INTRAVENOUS at 05:43

## 2025-02-21 RX ADMIN — FOLIC ACID 1 MG: 1 TABLET ORAL at 08:23

## 2025-02-21 RX ADMIN — SODIUM CHLORIDE: 0.9 INJECTION, SOLUTION INTRAVENOUS at 05:40

## 2025-02-21 RX ADMIN — HEPARIN SODIUM 5000 UNITS: 5000 INJECTION INTRAVENOUS; SUBCUTANEOUS at 00:41

## 2025-02-21 RX ADMIN — SODIUM CHLORIDE, PRESERVATIVE FREE 10 ML: 5 INJECTION INTRAVENOUS at 02:50

## 2025-02-21 RX ADMIN — HEPARIN SODIUM 5000 UNITS: 5000 INJECTION INTRAVENOUS; SUBCUTANEOUS at 17:23

## 2025-02-21 RX ADMIN — Medication 0.5 MG: at 02:50

## 2025-02-21 RX ADMIN — Medication 0.5 MG: at 12:00

## 2025-02-21 RX ADMIN — SODIUM CHLORIDE, PRESERVATIVE FREE 10 ML: 5 INJECTION INTRAVENOUS at 21:28

## 2025-02-21 RX ADMIN — GUAIFENESIN AND CODEINE PHOSPHATE 5 ML: 100; 10 SOLUTION ORAL at 02:50

## 2025-02-21 RX ADMIN — ONDANSETRON 4 MG: 4 TABLET, ORALLY DISINTEGRATING ORAL at 22:11

## 2025-02-21 RX ADMIN — LINEZOLID 600 MG: 600 INJECTION, SOLUTION INTRAVENOUS at 21:59

## 2025-02-21 RX ADMIN — CEFEPIME 2000 MG: 2 INJECTION, POWDER, FOR SOLUTION INTRAVENOUS at 18:56

## 2025-02-21 RX ADMIN — OXYCODONE 5 MG: 5 TABLET ORAL at 17:13

## 2025-02-21 RX ADMIN — HEPARIN SODIUM 5000 UNITS: 5000 INJECTION INTRAVENOUS; SUBCUTANEOUS at 21:27

## 2025-02-21 RX ADMIN — FUROSEMIDE 20 MG: 10 INJECTION, SOLUTION INTRAMUSCULAR; INTRAVENOUS at 00:33

## 2025-02-21 RX ADMIN — HEPARIN SODIUM 5000 UNITS: 5000 INJECTION INTRAVENOUS; SUBCUTANEOUS at 10:05

## 2025-02-21 RX ADMIN — LINEZOLID 600 MG: 600 INJECTION, SOLUTION INTRAVENOUS at 12:14

## 2025-02-21 RX ADMIN — ACETAMINOPHEN 650 MG: 325 TABLET ORAL at 21:26

## 2025-02-21 RX ADMIN — Medication 0.5 MG: at 22:11

## 2025-02-21 RX ADMIN — SODIUM CHLORIDE, PRESERVATIVE FREE 10 ML: 5 INJECTION INTRAVENOUS at 00:34

## 2025-02-21 ASSESSMENT — PAIN DESCRIPTION - DESCRIPTORS
DESCRIPTORS: ACHING
DESCRIPTORS: ACHING;DISCOMFORT
DESCRIPTORS: ACHING

## 2025-02-21 ASSESSMENT — PAIN SCALES - WONG BAKER: WONGBAKER_NUMERICALRESPONSE: NO HURT

## 2025-02-21 ASSESSMENT — PAIN DESCRIPTION - LOCATION
LOCATION: HEAD
LOCATION: CHEST
LOCATION: GENERALIZED

## 2025-02-21 ASSESSMENT — PAIN SCALES - GENERAL
PAINLEVEL_OUTOF10: 8
PAINLEVEL_OUTOF10: 6
PAINLEVEL_OUTOF10: 7
PAINLEVEL_OUTOF10: 6

## 2025-02-21 ASSESSMENT — PAIN DESCRIPTION - ORIENTATION
ORIENTATION: RIGHT;LEFT
ORIENTATION: RIGHT;LEFT;MID

## 2025-02-21 ASSESSMENT — PAIN DESCRIPTION - PAIN TYPE
TYPE: ACUTE PAIN
TYPE: ACUTE PAIN

## 2025-02-21 NOTE — H&P
[facility]     Department of Internal Medicine - Staff Internal Medicine Teaching Service          ADMISSION NOTE/HISTORY AND PHYSICAL EXAMINATION   Date: 2/21/2025  Patient Name: James Gabriel  Date of admission: 2/20/2025  3:53 PM  YOB: 1960  PCP: Ishan Feilz MD  History Obtained From:  patient    CHIEF COMPLAINT     Chief complaint: Shortness of breath    HISTORY OF PRESENTING ILLNESS     The patient is a pleasant 64 y.o. male with past medical history significant for:  CKD  CHF  History of BPH  History of schizoaffective and bipolar disorder with previous psych admissions.      Presents with a chief complaint of shortness of breath.  Patient was recently hospitalized at [facility] for shortness of breath where he was diagnosed with acute respiratory failure with hypoxia and hypercapnia due to acute heart failure of unknown etiology with underlying undiagnosed COPD.  X-ray had showed decompensated congestive heart failure with interstitial pulmonary edema and small left pleural effusion with superimposed pneumonia.  Patient was started on cardiology and infectious workup but apparently left the hospital AGAINST MEDICAL advice.  Patient presented to [place] with acute shortness of breath.  X-ray showed bilateral airspace disease finding concerning for pneumonia.  CBC showed WBC of 12, proBNP 20795, troponin 188-167, creatinine 1.4 baseline around 1.  Patient was combative and uncooperative.  Patient would not answer any questions and kept on saying to leave him alone.  Patient was admitted to internal medicine for further infectious workup.    Review of Systems   Unable to perform ROS: Other   Patient refusing to answer questions and repeatedly stating to leave him alone.    PAST MEDICAL HISTORY     Past Medical History:   Diagnosis Date    Chronic kidney disease     kidney stones    Degenerative disk disease     Cervical    Depression     Hematuria

## 2025-02-21 NOTE — ED NOTES
Writer walked into pt's room after pt removed monitor cords. Pt is refusing to let writer put pt back on the monitor. Pt wants to speak with a \"superior.\"

## 2025-02-21 NOTE — ED NOTES
Pt refusing to wear Bipap. Pt educated on the importance of wearing the Bipap. Pt is still refusing. Pt's SP02 86 % on NRB

## 2025-02-21 NOTE — CARE COORDINATION
Case Management Assessment  Initial Evaluation    Date/Time of Evaluation: 2/21/2025 9:25AM  Assessment Completed by: Colleen Calabrese RN    If patient is discharged prior to next notation, then this note serves as note for discharge by case management.    Patient Name: James Gabriel                   YOB: 1960  Diagnosis: Shortness of breath [R06.02]  Multifocal pneumonia [J18.9]  Cardiomyopathy, unspecified type (HCC) [I42.9]  Acute on chronic congestive heart failure, unspecified heart failure type (HCC) [I50.9]  Congestive heart failure, unspecified HF chronicity, unspecified heart failure type (HCC) [I50.9]                   Date / Time: 2/20/2025  3:53 PM    Patient Admission Status: Inpatient   Readmission Risk (Low < 19, Mod (19-27), High > 27): Readmission Risk Score: 9.1    Current PCP: Ishan Feliz MD  PCP verified by CM? (P) Yes (won't answer name of doctor)    Chart Reviewed: Yes      History Provided by: (P) Patient  Patient Orientation: (P) Alert and Oriented, Person, Place, Situation    Patient Cognition: (P) Alert    Hospitalization in the last 30 days (Readmission):  No    If yes, Readmission Assessment in CM Navigator will be completed.    Advance Directives:      Code Status: Full Code   Patient's Primary Decision Maker is: (P) Legal Next of Kin      Discharge Planning:    Patient lives with: (P) Alone Type of Home: (P) House  Primary Care Giver: (P) Self  Patient Support Systems include: (P) Children, Other (Comment) (ex wife)   Current Financial resources: (P) Medicare  Current community resources:    Current services prior to admission: (P) None            Current DME:              Type of Home Care services:  (P) None    ADLS  Prior functional level: (P) Independent in ADLs/IADLs  Current functional level: (P) Independent in ADLs/IADLs    PT AM-PAC:   /24  OT AM-PAC:   /24    Family can provide assistance at DC: (P) No  Would you like Case Management to discuss the discharge

## 2025-02-22 PROBLEM — I42.8 NON-ISCHEMIC CARDIOMYOPATHY (HCC): Status: ACTIVE | Noted: 2025-02-22

## 2025-02-22 PROBLEM — I42.9 CARDIOMYOPATHY (HCC): Status: ACTIVE | Noted: 2025-02-22

## 2025-02-22 PROBLEM — I35.1 NONRHEUMATIC AORTIC VALVE INSUFFICIENCY: Status: ACTIVE | Noted: 2025-02-22

## 2025-02-22 PROBLEM — I50.23 ACUTE ON CHRONIC SYSTOLIC CHF (CONGESTIVE HEART FAILURE) (HCC): Status: ACTIVE | Noted: 2025-02-22

## 2025-02-22 LAB
ANION GAP SERPL CALCULATED.3IONS-SCNC: 10 MMOL/L (ref 9–16)
BASOPHILS # BLD: 0.03 K/UL (ref 0–0.2)
BASOPHILS NFR BLD: 0 % (ref 0–2)
BUN SERPL-MCNC: 25 MG/DL (ref 8–23)
CALCIUM SERPL-MCNC: 8.4 MG/DL (ref 8.6–10.4)
CHLORIDE SERPL-SCNC: 103 MMOL/L (ref 98–107)
CO2 SERPL-SCNC: 23 MMOL/L (ref 20–31)
CREAT SERPL-MCNC: 1.3 MG/DL (ref 0.7–1.2)
EOSINOPHIL # BLD: 0.03 K/UL (ref 0–0.44)
EOSINOPHILS RELATIVE PERCENT: 0 % (ref 1–4)
ERYTHROCYTE [DISTWIDTH] IN BLOOD BY AUTOMATED COUNT: 13.9 % (ref 11.8–14.4)
GFR, ESTIMATED: 61 ML/MIN/1.73M2
GLUCOSE SERPL-MCNC: 103 MG/DL (ref 74–99)
HCT VFR BLD AUTO: 36.2 % (ref 40.7–50.3)
HGB BLD-MCNC: 12.3 G/DL (ref 13–17)
IMM GRANULOCYTES # BLD AUTO: <0.03 K/UL (ref 0–0.3)
IMM GRANULOCYTES NFR BLD: 0 %
LYMPHOCYTES NFR BLD: 1.78 K/UL (ref 1.1–3.7)
LYMPHOCYTES RELATIVE PERCENT: 21 % (ref 24–43)
MAGNESIUM SERPL-MCNC: 2.1 MG/DL (ref 1.6–2.4)
MCH RBC QN AUTO: 34.1 PG (ref 25.2–33.5)
MCHC RBC AUTO-ENTMCNC: 34 G/DL (ref 28.4–34.8)
MCV RBC AUTO: 100.3 FL (ref 82.6–102.9)
MICROORGANISM SPEC CULT: NORMAL
MICROORGANISM/AGENT SPEC: NORMAL
MONOCYTES NFR BLD: 0.7 K/UL (ref 0.1–1.2)
MONOCYTES NFR BLD: 8 % (ref 3–12)
NEUTROPHILS NFR BLD: 71 % (ref 36–65)
NEUTS SEG NFR BLD: 6.06 K/UL (ref 1.5–8.1)
NRBC BLD-RTO: 0 PER 100 WBC
PLATELET # BLD AUTO: 188 K/UL (ref 138–453)
PMV BLD AUTO: 10.4 FL (ref 8.1–13.5)
POTASSIUM SERPL-SCNC: 3.7 MMOL/L (ref 3.7–5.3)
RBC # BLD AUTO: 3.61 M/UL (ref 4.21–5.77)
SODIUM SERPL-SCNC: 136 MMOL/L (ref 136–145)
SPECIMEN DESCRIPTION: NORMAL
TROPONIN I SERPL HS-MCNC: 206 NG/L (ref 0–22)
WBC OTHER # BLD: 8.6 K/UL (ref 3.5–11.3)

## 2025-02-22 PROCEDURE — 85025 COMPLETE CBC W/AUTO DIFF WBC: CPT

## 2025-02-22 PROCEDURE — 2500000003 HC RX 250 WO HCPCS

## 2025-02-22 PROCEDURE — 93005 ELECTROCARDIOGRAM TRACING: CPT

## 2025-02-22 PROCEDURE — 36415 COLL VENOUS BLD VENIPUNCTURE: CPT

## 2025-02-22 PROCEDURE — 83735 ASSAY OF MAGNESIUM: CPT

## 2025-02-22 PROCEDURE — 6370000000 HC RX 637 (ALT 250 FOR IP): Performed by: STUDENT IN AN ORGANIZED HEALTH CARE EDUCATION/TRAINING PROGRAM

## 2025-02-22 PROCEDURE — 93005 ELECTROCARDIOGRAM TRACING: CPT | Performed by: INTERNAL MEDICINE

## 2025-02-22 PROCEDURE — 6360000002 HC RX W HCPCS

## 2025-02-22 PROCEDURE — 99222 1ST HOSP IP/OBS MODERATE 55: CPT | Performed by: INTERNAL MEDICINE

## 2025-02-22 PROCEDURE — 87641 MR-STAPH DNA AMP PROBE: CPT

## 2025-02-22 PROCEDURE — 94660 CPAP INITIATION&MGMT: CPT

## 2025-02-22 PROCEDURE — 2580000003 HC RX 258

## 2025-02-22 PROCEDURE — 80048 BASIC METABOLIC PNL TOTAL CA: CPT

## 2025-02-22 PROCEDURE — 84484 ASSAY OF TROPONIN QUANT: CPT

## 2025-02-22 PROCEDURE — 99232 SBSQ HOSP IP/OBS MODERATE 35: CPT | Performed by: INTERNAL MEDICINE

## 2025-02-22 PROCEDURE — 6370000000 HC RX 637 (ALT 250 FOR IP)

## 2025-02-22 PROCEDURE — 2060000000 HC ICU INTERMEDIATE R&B

## 2025-02-22 RX ORDER — SPIRONOLACTONE 25 MG/1
12.5 TABLET ORAL DAILY
Status: DISCONTINUED | OUTPATIENT
Start: 2025-02-23 | End: 2025-02-24 | Stop reason: HOSPADM

## 2025-02-22 RX ORDER — FUROSEMIDE 10 MG/ML
20 INJECTION INTRAMUSCULAR; INTRAVENOUS ONCE
Status: DISCONTINUED | OUTPATIENT
Start: 2025-02-22 | End: 2025-02-22

## 2025-02-22 RX ORDER — METOPROLOL SUCCINATE 25 MG/1
12.5 TABLET, EXTENDED RELEASE ORAL DAILY
Status: DISCONTINUED | OUTPATIENT
Start: 2025-02-22 | End: 2025-02-24 | Stop reason: HOSPADM

## 2025-02-22 RX ORDER — ECHINACEA PURPUREA EXTRACT 125 MG
1 TABLET ORAL PRN
Status: DISCONTINUED | OUTPATIENT
Start: 2025-02-22 | End: 2025-02-24 | Stop reason: HOSPADM

## 2025-02-22 RX ORDER — FUROSEMIDE 10 MG/ML
40 INJECTION INTRAMUSCULAR; INTRAVENOUS ONCE
Status: COMPLETED | OUTPATIENT
Start: 2025-02-22 | End: 2025-02-22

## 2025-02-22 RX ORDER — FUROSEMIDE 10 MG/ML
40 INJECTION INTRAMUSCULAR; INTRAVENOUS 2 TIMES DAILY
Status: DISCONTINUED | OUTPATIENT
Start: 2025-02-22 | End: 2025-02-24 | Stop reason: HOSPADM

## 2025-02-22 RX ADMIN — HEPARIN SODIUM 5000 UNITS: 5000 INJECTION INTRAVENOUS; SUBCUTANEOUS at 22:39

## 2025-02-22 RX ADMIN — ONDANSETRON 4 MG: 4 TABLET, ORALLY DISINTEGRATING ORAL at 23:13

## 2025-02-22 RX ADMIN — FUROSEMIDE 40 MG: 10 INJECTION, SOLUTION INTRAMUSCULAR; INTRAVENOUS at 11:38

## 2025-02-22 RX ADMIN — LINEZOLID 600 MG: 600 INJECTION, SOLUTION INTRAVENOUS at 11:46

## 2025-02-22 RX ADMIN — SODIUM CHLORIDE, PRESERVATIVE FREE 10 ML: 5 INJECTION INTRAVENOUS at 13:39

## 2025-02-22 RX ADMIN — SALINE NASAL SPRAY 1 SPRAY: 1.5 SOLUTION NASAL at 13:57

## 2025-02-22 RX ADMIN — CEFEPIME 2000 MG: 2 INJECTION, POWDER, FOR SOLUTION INTRAVENOUS at 18:21

## 2025-02-22 RX ADMIN — ONDANSETRON 4 MG: 2 INJECTION INTRAMUSCULAR; INTRAVENOUS at 15:28

## 2025-02-22 RX ADMIN — HEPARIN SODIUM 5000 UNITS: 5000 INJECTION INTRAVENOUS; SUBCUTANEOUS at 06:25

## 2025-02-22 RX ADMIN — FUROSEMIDE 40 MG: 10 INJECTION, SOLUTION INTRAMUSCULAR; INTRAVENOUS at 18:22

## 2025-02-22 RX ADMIN — SODIUM CHLORIDE, PRESERVATIVE FREE 10 ML: 5 INJECTION INTRAVENOUS at 22:39

## 2025-02-22 RX ADMIN — METOPROLOL SUCCINATE 12.5 MG: 25 TABLET, EXTENDED RELEASE ORAL at 11:36

## 2025-02-22 RX ADMIN — LINEZOLID 600 MG: 600 INJECTION, SOLUTION INTRAVENOUS at 22:39

## 2025-02-22 RX ADMIN — CEFEPIME 2000 MG: 2 INJECTION, POWDER, FOR SOLUTION INTRAVENOUS at 06:24

## 2025-02-22 RX ADMIN — HEPARIN SODIUM 5000 UNITS: 5000 INJECTION INTRAVENOUS; SUBCUTANEOUS at 14:05

## 2025-02-22 RX ADMIN — FOLIC ACID 1 MG: 1 TABLET ORAL at 11:37

## 2025-02-22 ASSESSMENT — PAIN SCALES - GENERAL: PAINLEVEL_OUTOF10: 0

## 2025-02-22 NOTE — CONSULTS
[facility]    Consult / H&P               Today's Date: 2/22/2025  Patient Name: James Gabriel  Date of admission: 2/20/2025  3:53 PM  Patient's age: 64 y.o., 1960  Admission Dx: Shortness of breath [R06.02]  Multifocal pneumonia [J18.9]  Cardiomyopathy, unspecified type (HCC) [I42.9]  Acute on chronic congestive heart failure, unspecified heart failure type (HCC) [I50.9]  Congestive heart failure, unspecified HF chronicity, unspecified heart failure type (HCC) [I50.9]    Requesting Physician: Ibeth Lorenzo MD    Cardiac Evaluation Reason: Low LVEF, aortic insufficiency    History Obtained From: patient/chart review     History of Present Illness:    This patient 64 y.o. years old male who presented to hospital with shortness of breath and has been admitted for respiratory failure.  An echocardiogram was performed that showed severely reduced LVEF with moderate to severe AI prompting this cardiology consult.  Patient recently was at [facility] and underwent cardiac catheterization that showed normal epicardial coronary arteries.  His LVEDP was 34 at that time.  He has been started on IV diuresis.  He reports feeling better but still have some orthopnea.  He has bibasilar crackles.    Past Medical History:   has a past medical history of Chronic kidney disease, Degenerative disk disease, Depression, Hematuria, Paranoia ([facility]), and Schizophrenia ([facility]).    Past Surgical History:   has a past surgical history that includes hernia repair (3/2010); Cystoscopy (12/18/12); Ureteroscopy; and Lithotripsy (12/18/12).     Home Medications:    Prior to Admission medications    Medication Sig Start Date End Date Taking? Authorizing Provider   lisinopril (PRINIVIL;ZESTRIL) 10 MG tablet Take 1 tablet by mouth daily 7/28/24  Yes Philippe Gandara MD   divalproex (DEPAKOTE) 500 MG DR tablet Take 1 tablet by mouth 3 times daily    ProviderPhilippe MD   oxazepam (SERAX) 30 MG capsule Take 1

## 2025-02-23 ENCOUNTER — APPOINTMENT (OUTPATIENT)
Dept: GENERAL RADIOLOGY | Age: 65
DRG: 193 | End: 2025-02-23
Payer: MEDICARE

## 2025-02-23 LAB
ANION GAP SERPL CALCULATED.3IONS-SCNC: 11 MMOL/L (ref 9–16)
BASOPHILS # BLD: <0.03 K/UL (ref 0–0.2)
BASOPHILS NFR BLD: 0 % (ref 0–2)
BNP SERPL-MCNC: 5833 PG/ML (ref 0–125)
BUN SERPL-MCNC: 25 MG/DL (ref 8–23)
CALCIUM SERPL-MCNC: 8.8 MG/DL (ref 8.6–10.4)
CHLORIDE SERPL-SCNC: 101 MMOL/L (ref 98–107)
CO2 SERPL-SCNC: 24 MMOL/L (ref 20–31)
CREAT SERPL-MCNC: 1.4 MG/DL (ref 0.7–1.2)
EKG ATRIAL RATE: 90 BPM
EKG P AXIS: 55 DEGREES
EKG P-R INTERVAL: 116 MS
EKG Q-T INTERVAL: 406 MS
EKG QRS DURATION: 80 MS
EKG QTC CALCULATION (BAZETT): 496 MS
EKG R AXIS: -38 DEGREES
EKG T AXIS: 176 DEGREES
EKG VENTRICULAR RATE: 90 BPM
EOSINOPHIL # BLD: 0.03 K/UL (ref 0–0.44)
EOSINOPHILS RELATIVE PERCENT: 0 % (ref 1–4)
ERYTHROCYTE [DISTWIDTH] IN BLOOD BY AUTOMATED COUNT: 13.7 % (ref 11.8–14.4)
GFR, ESTIMATED: 56 ML/MIN/1.73M2
GLUCOSE SERPL-MCNC: 111 MG/DL (ref 74–99)
HCT VFR BLD AUTO: 35.5 % (ref 40.7–50.3)
HGB BLD-MCNC: 12.1 G/DL (ref 13–17)
IMM GRANULOCYTES # BLD AUTO: <0.03 K/UL (ref 0–0.3)
IMM GRANULOCYTES NFR BLD: 0 %
LYMPHOCYTES NFR BLD: 1.68 K/UL (ref 1.1–3.7)
LYMPHOCYTES RELATIVE PERCENT: 23 % (ref 24–43)
MCH RBC QN AUTO: 34.2 PG (ref 25.2–33.5)
MCHC RBC AUTO-ENTMCNC: 34.1 G/DL (ref 28.4–34.8)
MCV RBC AUTO: 100.3 FL (ref 82.6–102.9)
MONOCYTES NFR BLD: 0.71 K/UL (ref 0.1–1.2)
MONOCYTES NFR BLD: 10 % (ref 3–12)
MRSA, DNA, NASAL: NEGATIVE
NEUTROPHILS NFR BLD: 67 % (ref 36–65)
NEUTS SEG NFR BLD: 5.02 K/UL (ref 1.5–8.1)
NRBC BLD-RTO: 0 PER 100 WBC
PLATELET # BLD AUTO: 224 K/UL (ref 138–453)
PMV BLD AUTO: 10.5 FL (ref 8.1–13.5)
POTASSIUM SERPL-SCNC: 3.9 MMOL/L (ref 3.7–5.3)
RBC # BLD AUTO: 3.54 M/UL (ref 4.21–5.77)
SODIUM SERPL-SCNC: 136 MMOL/L (ref 136–145)
SPECIMEN DESCRIPTION: NORMAL
WBC OTHER # BLD: 7.5 K/UL (ref 3.5–11.3)

## 2025-02-23 PROCEDURE — 6370000000 HC RX 637 (ALT 250 FOR IP)

## 2025-02-23 PROCEDURE — 83880 ASSAY OF NATRIURETIC PEPTIDE: CPT

## 2025-02-23 PROCEDURE — 36415 COLL VENOUS BLD VENIPUNCTURE: CPT

## 2025-02-23 PROCEDURE — 99233 SBSQ HOSP IP/OBS HIGH 50: CPT | Performed by: NURSE PRACTITIONER

## 2025-02-23 PROCEDURE — 94660 CPAP INITIATION&MGMT: CPT

## 2025-02-23 PROCEDURE — 80048 BASIC METABOLIC PNL TOTAL CA: CPT

## 2025-02-23 PROCEDURE — 2060000000 HC ICU INTERMEDIATE R&B

## 2025-02-23 PROCEDURE — 85025 COMPLETE CBC W/AUTO DIFF WBC: CPT

## 2025-02-23 PROCEDURE — 99233 SBSQ HOSP IP/OBS HIGH 50: CPT | Performed by: INTERNAL MEDICINE

## 2025-02-23 PROCEDURE — 93010 ELECTROCARDIOGRAM REPORT: CPT | Performed by: INTERNAL MEDICINE

## 2025-02-23 PROCEDURE — 6360000002 HC RX W HCPCS

## 2025-02-23 PROCEDURE — 2500000003 HC RX 250 WO HCPCS

## 2025-02-23 PROCEDURE — 71045 X-RAY EXAM CHEST 1 VIEW: CPT

## 2025-02-23 PROCEDURE — 2580000003 HC RX 258

## 2025-02-23 PROCEDURE — 6370000000 HC RX 637 (ALT 250 FOR IP): Performed by: STUDENT IN AN ORGANIZED HEALTH CARE EDUCATION/TRAINING PROGRAM

## 2025-02-23 RX ADMIN — FUROSEMIDE 40 MG: 10 INJECTION, SOLUTION INTRAMUSCULAR; INTRAVENOUS at 17:30

## 2025-02-23 RX ADMIN — FOLIC ACID 1 MG: 1 TABLET ORAL at 09:56

## 2025-02-23 RX ADMIN — HEPARIN SODIUM 5000 UNITS: 5000 INJECTION INTRAVENOUS; SUBCUTANEOUS at 13:52

## 2025-02-23 RX ADMIN — CEFEPIME 2000 MG: 2 INJECTION, POWDER, FOR SOLUTION INTRAVENOUS at 06:00

## 2025-02-23 RX ADMIN — SODIUM CHLORIDE, PRESERVATIVE FREE 10 ML: 5 INJECTION INTRAVENOUS at 09:57

## 2025-02-23 RX ADMIN — FUROSEMIDE 40 MG: 10 INJECTION, SOLUTION INTRAMUSCULAR; INTRAVENOUS at 09:57

## 2025-02-23 RX ADMIN — HEPARIN SODIUM 5000 UNITS: 5000 INJECTION INTRAVENOUS; SUBCUTANEOUS at 20:36

## 2025-02-23 RX ADMIN — METOPROLOL SUCCINATE 12.5 MG: 25 TABLET, EXTENDED RELEASE ORAL at 09:56

## 2025-02-23 RX ADMIN — SPIRONOLACTONE 12.5 MG: 25 TABLET, FILM COATED ORAL at 09:56

## 2025-02-23 RX ADMIN — SALINE NASAL SPRAY 1 SPRAY: 1.5 SOLUTION NASAL at 09:58

## 2025-02-23 RX ADMIN — SODIUM CHLORIDE, PRESERVATIVE FREE 10 ML: 5 INJECTION INTRAVENOUS at 20:36

## 2025-02-23 RX ADMIN — ACETAMINOPHEN 650 MG: 325 TABLET ORAL at 04:10

## 2025-02-23 ASSESSMENT — PAIN DESCRIPTION - LOCATION: LOCATION: BACK

## 2025-02-23 ASSESSMENT — PAIN DESCRIPTION - DESCRIPTORS: DESCRIPTORS: ACHING;DISCOMFORT

## 2025-02-23 ASSESSMENT — PAIN DESCRIPTION - ORIENTATION: ORIENTATION: MID

## 2025-02-23 ASSESSMENT — PAIN SCALES - GENERAL
PAINLEVEL_OUTOF10: 4
PAINLEVEL_OUTOF10: 6

## 2025-02-24 VITALS
DIASTOLIC BLOOD PRESSURE: 62 MMHG | OXYGEN SATURATION: 99 % | WEIGHT: 122.14 LBS | RESPIRATION RATE: 16 BRPM | BODY MASS INDEX: 19.17 KG/M2 | HEART RATE: 73 BPM | SYSTOLIC BLOOD PRESSURE: 103 MMHG | HEIGHT: 67 IN | TEMPERATURE: 97.5 F

## 2025-02-24 PROBLEM — R06.02 SHORTNESS OF BREATH: Status: ACTIVE | Noted: 2025-02-24

## 2025-02-24 LAB
BASOPHILS # BLD: 0.05 K/UL (ref 0–0.2)
BASOPHILS NFR BLD: 1 % (ref 0–2)
EKG ATRIAL RATE: 94 BPM
EKG P AXIS: 58 DEGREES
EKG P-R INTERVAL: 138 MS
EKG Q-T INTERVAL: 400 MS
EKG QRS DURATION: 82 MS
EKG QTC CALCULATION (BAZETT): 500 MS
EKG R AXIS: -37 DEGREES
EKG T AXIS: 167 DEGREES
EKG VENTRICULAR RATE: 94 BPM
EOSINOPHIL # BLD: 0.04 K/UL (ref 0–0.44)
EOSINOPHILS RELATIVE PERCENT: 1 % (ref 1–4)
ERYTHROCYTE [DISTWIDTH] IN BLOOD BY AUTOMATED COUNT: 14 % (ref 11.8–14.4)
HCT VFR BLD AUTO: 37.4 % (ref 40.7–50.3)
HGB BLD-MCNC: 11.9 G/DL (ref 13–17)
IMM GRANULOCYTES # BLD AUTO: 0.03 K/UL (ref 0–0.3)
IMM GRANULOCYTES NFR BLD: 0 %
LYMPHOCYTES NFR BLD: 1.48 K/UL (ref 1.1–3.7)
LYMPHOCYTES RELATIVE PERCENT: 21 % (ref 24–43)
MCH RBC QN AUTO: 33.4 PG (ref 25.2–33.5)
MCHC RBC AUTO-ENTMCNC: 31.8 G/DL (ref 28.4–34.8)
MCV RBC AUTO: 105.1 FL (ref 82.6–102.9)
MONOCYTES NFR BLD: 0.72 K/UL (ref 0.1–1.2)
MONOCYTES NFR BLD: 10 % (ref 3–12)
NEUTROPHILS NFR BLD: 67 % (ref 36–65)
NEUTS SEG NFR BLD: 4.63 K/UL (ref 1.5–8.1)
NRBC BLD-RTO: 0 PER 100 WBC
PLATELET # BLD AUTO: 224 K/UL (ref 138–453)
PMV BLD AUTO: 11.6 FL (ref 8.1–13.5)
RBC # BLD AUTO: 3.56 M/UL (ref 4.21–5.77)
RBC # BLD: ABNORMAL 10*6/UL
WBC OTHER # BLD: 7 K/UL (ref 3.5–11.3)

## 2025-02-24 PROCEDURE — 85025 COMPLETE CBC W/AUTO DIFF WBC: CPT

## 2025-02-24 PROCEDURE — 99233 SBSQ HOSP IP/OBS HIGH 50: CPT | Performed by: NURSE PRACTITIONER

## 2025-02-24 PROCEDURE — 6370000000 HC RX 637 (ALT 250 FOR IP)

## 2025-02-24 PROCEDURE — 6370000000 HC RX 637 (ALT 250 FOR IP): Performed by: STUDENT IN AN ORGANIZED HEALTH CARE EDUCATION/TRAINING PROGRAM

## 2025-02-24 PROCEDURE — 2580000003 HC RX 258

## 2025-02-24 PROCEDURE — 6360000002 HC RX W HCPCS

## 2025-02-24 PROCEDURE — 36415 COLL VENOUS BLD VENIPUNCTURE: CPT

## 2025-02-24 PROCEDURE — 2500000003 HC RX 250 WO HCPCS

## 2025-02-24 PROCEDURE — 93010 ELECTROCARDIOGRAM REPORT: CPT | Performed by: INTERNAL MEDICINE

## 2025-02-24 PROCEDURE — 99233 SBSQ HOSP IP/OBS HIGH 50: CPT | Performed by: HOSPITALIST

## 2025-02-24 RX ORDER — HYDROXYZINE HYDROCHLORIDE 10 MG/1
10 TABLET, FILM COATED ORAL ONCE
Status: COMPLETED | OUTPATIENT
Start: 2025-02-24 | End: 2025-02-24

## 2025-02-24 RX ADMIN — HEPARIN SODIUM 5000 UNITS: 5000 INJECTION INTRAVENOUS; SUBCUTANEOUS at 05:22

## 2025-02-24 RX ADMIN — AZITHROMYCIN MONOHYDRATE 250 MG: 500 INJECTION, POWDER, LYOPHILIZED, FOR SOLUTION INTRAVENOUS at 00:30

## 2025-02-24 RX ADMIN — METOPROLOL SUCCINATE 12.5 MG: 25 TABLET, EXTENDED RELEASE ORAL at 10:13

## 2025-02-24 RX ADMIN — SPIRONOLACTONE 12.5 MG: 25 TABLET, FILM COATED ORAL at 10:13

## 2025-02-24 RX ADMIN — ACETAMINOPHEN 650 MG: 325 TABLET ORAL at 04:28

## 2025-02-24 RX ADMIN — FOLIC ACID 1 MG: 1 TABLET ORAL at 10:13

## 2025-02-24 RX ADMIN — HYDROXYZINE HYDROCHLORIDE 10 MG: 10 TABLET ORAL at 01:10

## 2025-02-24 RX ADMIN — SODIUM CHLORIDE, PRESERVATIVE FREE 10 ML: 5 INJECTION INTRAVENOUS at 10:15

## 2025-02-24 RX ADMIN — ONDANSETRON 4 MG: 4 TABLET, ORALLY DISINTEGRATING ORAL at 01:14

## 2025-02-24 RX ADMIN — CEFTRIAXONE SODIUM 1000 MG: 1 INJECTION, POWDER, FOR SOLUTION INTRAMUSCULAR; INTRAVENOUS at 00:31

## 2025-02-24 RX ADMIN — FUROSEMIDE 40 MG: 10 INJECTION, SOLUTION INTRAMUSCULAR; INTRAVENOUS at 10:13

## 2025-02-24 ASSESSMENT — PAIN SCALES - GENERAL
PAINLEVEL_OUTOF10: 3
PAINLEVEL_OUTOF10: 7
PAINLEVEL_OUTOF10: 9

## 2025-02-24 ASSESSMENT — PAIN DESCRIPTION - LOCATION: LOCATION: BACK

## 2025-02-24 NOTE — PLAN OF CARE
Problem: Discharge Planning  Goal: Discharge to home or other facility with appropriate resources  2/21/2025 0800 by Scott Berkowitz RN  Outcome: Progressing  2/21/2025 0314 by Radha Velez RN  Outcome: Progressing     Problem: Pain  Goal: Verbalizes/displays adequate comfort level or baseline comfort level  2/21/2025 0800 by Scott Berkowitz RN  Outcome: Progressing  2/21/2025 0314 by Radha Velez RN  Outcome: Progressing     Problem: Safety - Adult  Goal: Free from fall injury  2/21/2025 0800 by Scott Berkowitz RN  Outcome: Progressing  2/21/2025 0314 by Radha Velez RN  Outcome: Progressing     Problem: Chronic Conditions and Co-morbidities  Goal: Patient's chronic conditions and co-morbidity symptoms are monitored and maintained or improved  2/21/2025 0800 by Scott Berkowitz RN  Outcome: Progressing  2/21/2025 0314 by Radha Velez RN  Outcome: Progressing     Problem: Respiratory - Adult  Goal: Achieves optimal ventilation and oxygenation  2/21/2025 0800 by Scott Berkowitz RN  Outcome: Progressing  2/21/2025 0314 by Radha Velez RN  Outcome: Progressing     Problem: Cardiovascular - Adult  Goal: Maintains optimal cardiac output and hemodynamic stability  2/21/2025 0800 by Scott Berkowitz RN  Outcome: Progressing  2/21/2025 0314 by Radha Velez RN  Outcome: Progressing  Goal: Absence of cardiac dysrhythmias or at baseline  2/21/2025 0800 by Scott Berkowitz RN  Outcome: Progressing  2/21/2025 0314 by Radha Velez RN  Outcome: Progressing     Problem: Infection - Adult  Goal: Absence of infection at discharge  2/21/2025 0800 by Scott Berkowitz RN  Outcome: Progressing  2/21/2025 0314 by Radha Velez RN  Outcome: Progressing  Goal: Absence of infection during hospitalization  2/21/2025 0800 by Scott Berkowitz RN  Outcome: Progressing  2/21/2025 0314 by Radha Velez RN  Outcome: Progressing  Goal: Absence of fever/infection during anticipated neutropenic period  2/21/2025 0800 by 
  Problem: Discharge Planning  Goal: Discharge to home or other facility with appropriate resources  2/21/2025 1730 by Scott Berkowitz RN  Outcome: Progressing  2/21/2025 0800 by Scott Berkowitz RN  Outcome: Progressing     Problem: Pain  Goal: Verbalizes/displays adequate comfort level or baseline comfort level  2/21/2025 1730 by Scott Berkowitz RN  Outcome: Progressing  2/21/2025 0800 by Scott Berkowitz RN  Outcome: Progressing     Problem: Safety - Adult  Goal: Free from fall injury  2/21/2025 1730 by Scott Berkowitz RN  Outcome: Progressing  Flowsheets (Taken 2/21/2025 1700)  Free From Fall Injury: Instruct family/caregiver on patient safety  2/21/2025 0800 by Scott Berkowitz RN  Outcome: Progressing     Problem: Chronic Conditions and Co-morbidities  Goal: Patient's chronic conditions and co-morbidity symptoms are monitored and maintained or improved  2/21/2025 1730 by Scott Berkowitz RN  Outcome: Progressing  2/21/2025 0800 by Scott Berkowitz RN  Outcome: Progressing     Problem: Respiratory - Adult  Goal: Achieves optimal ventilation and oxygenation  2/21/2025 1730 by Scott Berkowitz RN  Outcome: Progressing  2/21/2025 0902 by Griselda Villareal RCP  Outcome: Progressing  2/21/2025 0800 by Scott Berkowitz RN  Outcome: Progressing     Problem: Cardiovascular - Adult  Goal: Maintains optimal cardiac output and hemodynamic stability  2/21/2025 1730 by Scott Berkowitz RN  Outcome: Progressing  2/21/2025 0800 by Scott Berkowitz RN  Outcome: Progressing  Goal: Absence of cardiac dysrhythmias or at baseline  2/21/2025 1730 by Scott Berkowitz RN  Outcome: Progressing  2/21/2025 0800 by Scott Berkowitz RN  Outcome: Progressing     Problem: Infection - Adult  Goal: Absence of infection at discharge  2/21/2025 1730 by Scott Berkowitz RN  Outcome: Progressing  2/21/2025 0800 by Scott Berkowitz RN  Outcome: Progressing  Goal: Absence of infection during hospitalization  2/21/2025 1730 by Scott Berkowitz RN  Outcome: Progressing  2/21/2025 
  Problem: Discharge Planning  Goal: Discharge to home or other facility with appropriate resources  2/21/2025 2314 by Stalin Meek RN  Outcome: Progressing  Flowsheets (Taken 2/21/2025 2000)  Discharge to home or other facility with appropriate resources:   Identify barriers to discharge with patient and caregiver   Arrange for needed discharge resources and transportation as appropriate   Identify discharge learning needs (meds, wound care, etc)  2/21/2025 1730 by Scott Berkowitz RN  Outcome: Progressing     Problem: Pain  Goal: Verbalizes/displays adequate comfort level or baseline comfort level  2/21/2025 2314 by Stalin Meek RN  Outcome: Progressing  2/21/2025 1730 by Scott Berkowitz RN  Outcome: Progressing     Problem: Safety - Adult  Goal: Free from fall injury  2/21/2025 2314 by Stalin Meek RN  Outcome: Progressing  2/21/2025 1730 by Scott Berkowitz RN  Outcome: Progressing  Flowsheets (Taken 2/21/2025 1700)  Free From Fall Injury: Instruct family/caregiver on patient safety     Problem: Chronic Conditions and Co-morbidities  Goal: Patient's chronic conditions and co-morbidity symptoms are monitored and maintained or improved  2/21/2025 2314 by Stalin Meek RN  Outcome: Progressing  Flowsheets (Taken 2/21/2025 2000)  Care Plan - Patient's Chronic Conditions and Co-Morbidity Symptoms are Monitored and Maintained or Improved:   Collaborate with multidisciplinary team to address chronic and comorbid conditions and prevent exacerbation or deterioration   Monitor and assess patient's chronic conditions and comorbid symptoms for stability, deterioration, or improvement  2/21/2025 1730 by Scott Berkowitz RN  Outcome: Progressing     Problem: Respiratory - Adult  Goal: Achieves optimal ventilation and oxygenation  2/21/2025 2314 by Stalin Meek RN  Outcome: Progressing  2/21/2025 2104 by Rashard Hoyos RCP  Outcome: Progressing  Flowsheets (Taken 2/21/2025 2000 by Stalin Meek RN)  Achieves optimal 
  Problem: Discharge Planning  Goal: Discharge to home or other facility with appropriate resources  2/23/2025 0406 by Dalia Soliman RN  Outcome: Progressing  2/22/2025 1947 by Brie Pizarro RN  Outcome: Progressing     Problem: Pain  Goal: Verbalizes/displays adequate comfort level or baseline comfort level  2/23/2025 0406 by Dalia Soliman RN  Outcome: Progressing  2/22/2025 1947 by Brie Pizarro RN  Outcome: Progressing     Problem: Safety - Adult  Goal: Free from fall injury  2/23/2025 0406 by Dalia Soliman RN  Outcome: Progressing  2/22/2025 1947 by Brie Pizarro RN  Outcome: Progressing     Problem: Chronic Conditions and Co-morbidities  Goal: Patient's chronic conditions and co-morbidity symptoms are monitored and maintained or improved  2/23/2025 0406 by Dalia Soliman RN  Outcome: Progressing  2/22/2025 1947 by Brie Pizarro RN  Outcome: Progressing     Problem: Respiratory - Adult  Goal: Achieves optimal ventilation and oxygenation  2/23/2025 0406 by Dalia Soliman RN  Outcome: Progressing  2/22/2025 1947 by Brie Pizarro RN  Outcome: Progressing     Problem: Cardiovascular - Adult  Goal: Maintains optimal cardiac output and hemodynamic stability  2/23/2025 0406 by Dalia Soliman RN  Outcome: Progressing  2/22/2025 1947 by Brie Pizarro RN  Outcome: Progressing  Goal: Absence of cardiac dysrhythmias or at baseline  2/23/2025 0406 by Dalia Soliman RN  Outcome: Progressing  2/22/2025 1947 by Brie Pizarro RN  Outcome: Progressing     Problem: Infection - Adult  Goal: Absence of infection at discharge  2/23/2025 0406 by Dalia Soliman RN  Outcome: Progressing  2/22/2025 1947 by Brie Pizarro RN  Outcome: Progressing  Goal: Absence of infection during hospitalization  2/23/2025 0406 by Dalia Soliman RN  Outcome: Progressing  2/22/2025 1947 by Brie Pizarro, RN  Outcome: Progressing  Goal: 
  Problem: Discharge Planning  Goal: Discharge to home or other facility with appropriate resources  2/23/2025 1750 by Angella Clifton RN  Outcome: Progressing  2/23/2025 0406 by Dalia Soliman RN  Outcome: Progressing     Problem: Pain  Goal: Verbalizes/displays adequate comfort level or baseline comfort level  2/23/2025 1750 by Angella Clifton RN  Outcome: Progressing  2/23/2025 0406 by Dalia Soliman RN  Outcome: Progressing     Problem: Safety - Adult  Goal: Free from fall injury  2/23/2025 1750 by Angella Clifton RN  Outcome: Progressing  2/23/2025 0406 by Dalia Soliman RN  Outcome: Progressing     Problem: Chronic Conditions and Co-morbidities  Goal: Patient's chronic conditions and co-morbidity symptoms are monitored and maintained or improved  2/23/2025 1750 by Angella Clifton RN  Outcome: Progressing  2/23/2025 0406 by Dalia Soliman RN  Outcome: Progressing     Problem: Respiratory - Adult  Goal: Achieves optimal ventilation and oxygenation  2/23/2025 1750 by Angelal Clifton RN  Outcome: Progressing  2/23/2025 0406 by Dalia Soliman RN  Outcome: Progressing     Problem: Cardiovascular - Adult  Goal: Maintains optimal cardiac output and hemodynamic stability  2/23/2025 1750 by Angella Clifton RN  Outcome: Progressing  2/23/2025 0406 by Dalia Soliman RN  Outcome: Progressing  Goal: Absence of cardiac dysrhythmias or at baseline  2/23/2025 1750 by Angella Clifton RN  Outcome: Progressing  2/23/2025 0406 by Dalia Soliman RN  Outcome: Progressing     Problem: Infection - Adult  Goal: Absence of infection at discharge  2/23/2025 1750 by Angella Clifton RN  Outcome: Progressing  2/23/2025 0406 by Dalia Soliman RN  Outcome: Progressing  Goal: Absence of infection during hospitalization  2/23/2025 1750 by Angella Clifton RN  Outcome: Progressing  2/23/2025 0406 by Dalia Soliman RN  Outcome: Progressing  Goal: Absence of fever/infection during anticipated neutropenic 
  Problem: Discharge Planning  Goal: Discharge to home or other facility with appropriate resources  Outcome: Progressing     Problem: Pain  Goal: Verbalizes/displays adequate comfort level or baseline comfort level  Outcome: Progressing     Problem: Safety - Adult  Goal: Free from fall injury  Outcome: Progressing     Problem: Chronic Conditions and Co-morbidities  Goal: Patient's chronic conditions and co-morbidity symptoms are monitored and maintained or improved  Outcome: Progressing     Problem: Respiratory - Adult  Goal: Achieves optimal ventilation and oxygenation  Outcome: Progressing     Problem: Cardiovascular - Adult  Goal: Maintains optimal cardiac output and hemodynamic stability  Outcome: Progressing  Goal: Absence of cardiac dysrhythmias or at baseline  Outcome: Progressing     Problem: Infection - Adult  Goal: Absence of infection at discharge  Outcome: Progressing  Goal: Absence of infection during hospitalization  Outcome: Progressing  Goal: Absence of fever/infection during anticipated neutropenic period  Outcome: Progressing     Problem: Hematologic - Adult  Goal: Maintains hematologic stability  Outcome: Progressing     
  Problem: Discharge Planning  Goal: Discharge to home or other facility with appropriate resources  Outcome: Progressing     Problem: Pain  Goal: Verbalizes/displays adequate comfort level or baseline comfort level  Outcome: Progressing     Problem: Safety - Adult  Goal: Free from fall injury  Outcome: Progressing     Problem: Chronic Conditions and Co-morbidities  Goal: Patient's chronic conditions and co-morbidity symptoms are monitored and maintained or improved  Outcome: Progressing     Problem: Respiratory - Adult  Goal: Achieves optimal ventilation and oxygenation  Outcome: Progressing     Problem: Cardiovascular - Adult  Goal: Maintains optimal cardiac output and hemodynamic stability  Outcome: Progressing  Goal: Absence of cardiac dysrhythmias or at baseline  Outcome: Progressing     Problem: Infection - Adult  Goal: Absence of infection at discharge  Outcome: Progressing  Goal: Absence of infection during hospitalization  Outcome: Progressing  Goal: Absence of fever/infection during anticipated neutropenic period  Outcome: Progressing     Problem: Hematologic - Adult  Goal: Maintains hematologic stability  Outcome: Progressing     Problem: Skin/Tissue Integrity  Goal: Skin integrity remains intact  Description: 1.  Monitor for areas of redness and/or skin breakdown  2.  Assess vascular access sites hourly  3.  Every 4-6 hours minimum:  Change oxygen saturation probe site  4.  Every 4-6 hours:  If on nasal continuous positive airway pressure, respiratory therapy assess nares and determine need for appliance change or resting period  Outcome: Progressing     Problem: ABCDS Injury Assessment  Goal: Absence of physical injury  Outcome: Progressing  Flowsheets (Taken 2/22/2025 3330 by Radha Velez RN)  Absence of Physical Injury: Implement safety measures based on patient assessment     
  Problem: Respiratory - Adult  Goal: Achieves optimal ventilation and oxygenation  2/21/2025 0902 by Griselda Villareal RCP  Outcome: Progressing     
  Problem: Respiratory - Adult  Goal: Achieves optimal ventilation and oxygenation  2/21/2025 2104 by Rashard Hoyos RCP  Outcome: Progressing     
Patient is now agreeable to TORI. Procedure, risks vs benefits and indication explained to patient. Patient stated understanding and confirmed he is agreeable to proceed. Consent obtained. Patient given opportunity to ask questions, he stated he had no further question. RN and Internal Medicine resident both present at bedside.     Patient has been NPO     REJI Good - MARCK     
discharge  2/24/2025 0238 by Jozef Baeza RN  Outcome: Progressing  2/23/2025 1750 by Angella Clifton RN  Outcome: Progressing  Goal: Absence of infection during hospitalization  2/24/2025 0238 by Jozef Baeza RN  Outcome: Progressing  2/23/2025 1750 by Angella Clifton RN  Outcome: Progressing  Goal: Absence of fever/infection during anticipated neutropenic period  2/24/2025 0238 by Jozef Baeza RN  Outcome: Progressing  2/23/2025 1750 by Angella Clifton RN  Outcome: Progressing     Problem: Hematologic - Adult  Goal: Maintains hematologic stability  2/24/2025 0238 by Jozef Baeza RN  Outcome: Progressing  2/23/2025 1750 by Angella Clifton RN  Outcome: Progressing     Problem: Skin/Tissue Integrity  Goal: Skin integrity remains intact  Description: 1.  Monitor for areas of redness and/or skin breakdown  2.  Assess vascular access sites hourly  3.  Every 4-6 hours minimum:  Change oxygen saturation probe site  4.  Every 4-6 hours:  If on nasal continuous positive airway pressure, respiratory therapy assess nares and determine need for appliance change or resting period  2/24/2025 0238 by Jozef Baeza RN  Outcome: Progressing  2/23/2025 1750 by Angella Clifton RN  Outcome: Progressing  Flowsheets (Taken 2/23/2025 0800)  Skin Integrity Remains Intact: Monitor for areas of redness and/or skin breakdown     Problem: ABCDS Injury Assessment  Goal: Absence of physical injury  2/24/2025 0238 by Jozef Baeza RN  Outcome: Progressing  2/23/2025 1750 by Angella Clifton RN  Outcome: Progressing  Flowsheets (Taken 2/23/2025 0800)  Absence of Physical Injury: Implement safety measures based on patient assessment

## 2025-02-24 NOTE — PROGRESS NOTES
[facility]  Internal Medicine Teaching Residency Program  Inpatient Daily Progress Note  ______________________________________________________________________________    Patient: James Gabriel  YOB: 1960   MRN:[id number]    Acct: [id number]     Room: 0511/0511-01  Admit date: 2/20/2025  Today's date: 02/23/25  Number of days in the hospital: 3    SUBJECTIVE   Admitting Diagnosis: Cardiomyopathy (HCC)  CC: Shortness of breath  -Patient seen and examined at bedside.  He remained afebrile overnight.  -Patient remained hemodynamically stable afebrile.  Oxygen requirement coming down.  This morning 2 L at 98%.  Patient states that he feels much better today and does not feel short of breath.  On room air desaturates to 90%.  Will continue oxygen for now.    BRIEF HISTORY     The patient is a pleasant 64 y.o. male with past medical history significant for:  CKD  CHF  History of BPH  History of schizoaffective and bipolar disorder with previous psych admissions.        Presents with a chief complaint of shortness of breath.  Patient was recently hospitalized at [facility] for shortness of breath where he was diagnosed with acute respiratory failure with hypoxia and hypercapnia due to acute heart failure of unknown etiology with underlying undiagnosed COPD.  X-ray had showed decompensated congestive heart failure with interstitial pulmonary edema and small left pleural effusion with superimposed pneumonia.  Patient was started on cardiology and infectious workup but apparently left the hospital AGAINST MEDICAL advice.  Patient presented to [place] with acute shortness of breath.  X-ray showed bilateral airspace disease finding concerning for pneumonia.  CBC showed WBC of 12, proBNP 38600, troponin 188-167, creatinine 1.4 baseline around 1.  Patient was combative and uncooperative.  Patient would not answer any questions and kept on saying to leave him 
    [facility]  Internal Medicine Teaching Residency Program  Inpatient Daily Progress Note  ______________________________________________________________________________    Patient: James Gabriel  YOB: 1960   MRN:[id number]    Acct: [id number]     Room: 0511/0511-01  Admit date: 2/20/2025  Today's date: 02/24/25  Number of days in the hospital: 4    SUBJECTIVE   Admitting Diagnosis: Cardiomyopathy (HCC)  CC: Shortness of breath  -Patient seen and examined at bedside.  He remained afebrile overnight.  -Patient remained hemodynamically stable afebrile.  Oxygen requirement coming down.  This morning 2 L at 98%.  Patient states that he feels much better today and does not feel short of breath.  On room continues to desaturate.  Patient was upset this morning with cardiology's instructions.  Spoke to the patient again and patient was agreeable to undergo TORI.    BRIEF HISTORY     The patient is a pleasant 64 y.o. male with past medical history significant for:  CKD  CHF  History of BPH  History of schizoaffective and bipolar disorder with previous psych admissions.        Presents with a chief complaint of shortness of breath.  Patient was recently hospitalized at [facility] for shortness of breath where he was diagnosed with acute respiratory failure with hypoxia and hypercapnia due to acute heart failure of unknown etiology with underlying undiagnosed COPD.  X-ray had showed decompensated congestive heart failure with interstitial pulmonary edema and small left pleural effusion with superimposed pneumonia.  Patient was started on cardiology and infectious workup but apparently left the hospital AGAINST MEDICAL advice.  Patient presented to [place] with acute shortness of breath.  X-ray showed bilateral airspace disease finding concerning for pneumonia.  CBC showed WBC of 12, proBNP 91431, troponin 188-167, creatinine 1.4 baseline around 1.  Patient 
    [facility]  Internal Medicine Teaching Residency Program  Inpatient Daily Progress Note  ______________________________________________________________________________    Patient: James Gabriel  YOB: 1960   MRN:[id number]    Acct: [id number]     Room: 0511/0511-01  Admit date: 2/20/2025  Today's date: 02/21/25  Number of days in the hospital: 1    SUBJECTIVE   Admitting Diagnosis: Multifocal pneumonia  CC: Shortness of breath  Pt examined at bedside. Chart & results reviewed.     Patient more cooperative today. States he still feels short of breath but much better today. Will schedule IV lasix 20.      BRIEF HISTORY     The patient is a pleasant 64 y.o. male with past medical history significant for:  CKD  CHF  History of BPH  History of schizoaffective and bipolar disorder with previous psych admissions.        Presents with a chief complaint of shortness of breath.  Patient was recently hospitalized at [facility] for shortness of breath where he was diagnosed with acute respiratory failure with hypoxia and hypercapnia due to acute heart failure of unknown etiology with underlying undiagnosed COPD.  X-ray had showed decompensated congestive heart failure with interstitial pulmonary edema and small left pleural effusion with superimposed pneumonia.  Patient was started on cardiology and infectious workup but apparently left the hospital AGAINST MEDICAL advice.  Patient presented to [place] with acute shortness of breath.  X-ray showed bilateral airspace disease finding concerning for pneumonia.  CBC showed WBC of 12, proBNP 09076, troponin 188-167, creatinine 1.4 baseline around 1.  Patient was combative and uncooperative.  Patient would not answer any questions and kept on saying to leave him alone.  Patient was admitted to internal medicine for further infectious workup.  Interval History   2/21  Patient tachypneic and slightly tachycardic 
    [facility]  Internal Medicine Teaching Residency Program  Inpatient Daily Progress Note  ______________________________________________________________________________    Patient: James Gabriel  YOB: 1960   MRN:[id number]    Acct: [id number]     Room: 0511/0511-01  Admit date: 2/20/2025  Today's date: 02/22/25  Number of days in the hospital: 2    SUBJECTIVE   Admitting Diagnosis: Multifocal pneumonia  CC: Shortness of breath  -Patient seen and examined at bedside.  He remained afebrile overnight.  -He continues to be in respiratory respiratory distress and continues to be on 6 L oxygen via nasal cannula alternating with BiPAP.  -Patient is currently very restless and fidgety.  Patient reports that he has not happy with the care being given to him.  -He underwent TTE yesterday which showed severe systolic dysfunction with EF 20 to 25% and global hypokinesis.  He is also found to have severe AR.  Patient is currently on IV Lasix 40 mg OD.  -Based on the echocardiogram finding, will consult cardiology for further management recommendations.  -Discussed the patient with RN, no other acute issues noted otherwise.      BRIEF HISTORY     The patient is a pleasant 64 y.o. male with past medical history significant for:  CKD  CHF  History of BPH  History of schizoaffective and bipolar disorder with previous psych admissions.        Presents with a chief complaint of shortness of breath.  Patient was recently hospitalized at [facility] for shortness of breath where he was diagnosed with acute respiratory failure with hypoxia and hypercapnia due to acute heart failure of unknown etiology with underlying undiagnosed COPD.  X-ray had showed decompensated congestive heart failure with interstitial pulmonary edema and small left pleural effusion with superimposed pneumonia.  Patient was started on cardiology and infectious workup but apparently left the hospital 
Attempts to get history from patient, patient refused. Patient took off bipap, ate a snack and agreed to wear nasal cannula. Patient frequently removes oxygen needs frequent reminders importance of keeping oxygen on. Sats average 92%. Medicine resident updated via perfect serve, expectorant order requested.   
Came to assess patient, patient is being aggressive, and agitated towards RN and guard in regards to the bed discomfort, charge nurse and guard move patient from bed to chair at noon gave patient cushion pillows while sitting in the chair patient was satisfied patient then asked to go back to bed at 1400pm. Patient currently aggressive towards staff RN and guard.   
Patient arrived to room 511. Placed on telemetry. Patient is drowsy, wearing bipap and is unable to participate in admission questions at this time. Writer will attempt to obtain history at later time.   
Patient currently wearing BIPAP. Patient given guaifenesin and prn ativan. Patient need frequent encouragement to keep bipap on.   
Pt left AMA, refusing to sign a form or allow nursing staff to do anything / assist him with anything. Doctor was notified of the incident, AMA form was filled out with witnessing nurse.  
The Pt has been verbally abusive to nurses and PCT . He reaps of his leads and ask for the nurse to come replace it because it her job.  Pt asked the PCT if he could go for a walk, PCT called out for the nurse if that would be OK, he became angry and yelling at the PTC stating he wants only the nurses to take care of him. He became aggressive and verbally abusive towards staffs, pulling out line and try to pull out his IV. Security was called to calm him . then keeps threatening to janice everyone especially the writer.     
02/20/25  1629   INR 1.1       Objective:   Vitals: BP (!) 118/56   Pulse 82   Temp 98.8 °F (37.1 °C) (Oral)   Resp 19   Ht 1.7 m (5' 6.93\")   Wt 55.4 kg (122 lb 2.2 oz)   SpO2 90%   BMI 19.17 kg/m²   General appearance: alert and cooperative with exam  HEENT: Head: Normocephalic, no lesions, without obvious abnormality.  Neck:no JVD, trachea midline, no adenopathy  Lungs: Clear to auscultation throughout. Slightly course but no rales. No distress on NC oxygen  Heart: Regular rate and rhythm, s1/s2 auscultated, no murmurs  Abdomen: soft, non-tender, bowel sounds active  Extremities: no edema  Neurologic: not done    Echo:  02/20/25     ECHO (TTE) COMPLETE (PRN CONTRAST/BUBBLE/STRAIN/3D) 02/21/2025  8:44 PM (Final)     Interpretation Summary    Left Ventricle: Severely reduced left ventricular systolic function with a visually estimated EF of 20 - 25%. Left ventricle is dilated. Normal wall thickness. Severe global hypokinesis present. Indeterminate diastolic function.    Right Ventricle: Right ventricle is mildly dilated.    Aortic Valve: Trileaflet valve. Sclerosis of the aortic valve cusps. Moderate to severe regurgitation with an eccentrically directed jet.    Mitral Valve: Mildly thickened leaflets. Mild to moderate regurgitation with a centrally directed jet.    Tricuspid Valve: Moderate regurgitation. The estimated RVSP is 55 mmHg.    Left Atrium: Left atrium is moderately dilated.    Right Atrium: Right atrium is dilated.    Image quality is good.        LAST CATH:   2/19/2025  Angiographically normal coronary arteries with left dominant circulation   LVEDP 34     Assessment / Acute Cardiac Problems:   Acute  HFrEF  Severe nonischemic cardiomyopathy with LVEF 20-25%  Moderate to severe aortic insufficiency  MAURISIO, likely cardiorenal    Patient Active Problem List:     Degenerative disk disease     Kidney stones     Migraine headache     Hepatitis C infection     Chest pain     Hypokalemia     Dysmetria   
 Degenerative disk disease     Kidney stones     Migraine headache     Hepatitis C infection     Chest pain     Hypokalemia     Dysmetria     Hep C w/o coma, chronic (HCC)     Complicated UTI (urinary tract infection)     Right hand weakness     Schizophrenia (HCC)     Bipolar 1 disorder, depressed (HCC)     Essential hypertension     Multifocal pneumonia     Acute on chronic congestive heart failure (HCC)     Cardiomyopathy (HCC)     Acute on chronic systolic CHF (congestive heart failure) (HCC)     Nonrheumatic aortic valve insufficiency      Plan of Treatment:   Stable. Symptomatically improved. 800ml urine output overnight. Will decrease Lasix down to daily. Continue BB and Aldactone.  We were consulted for TORI, procedure, risks vs benefits were previously explained by my partner. However patient is now stating he \"hasn't slept in 2 weeks and that he is not having any procedures until he gets some sleep. He states he \"googled it and it is our job to make sure we don't do any surgery if he is sleep deprived.I informed him that it is our job to make sure you alert oriented, are agreeable to a procedure and appropriate to give consent. I informed him that he has the right to refuse the procedure, we will not do a procedure without his consent and we can cancel it if he no longer wishes to proceed.I explained the procedure to him again in detail. Patient kept interrupting me and stating he wanted a doctor not a nurse practitioner and that he was going to call the medical board to complain. He began yelling and told me to leave his room. RN at bedside.\"   We will sign off. Please call us back for any acute CV issues or if patient would like to proceed.   Electronically signed by REJI Good NP on 2/24/2025 at 9:35 AM  [facility] "[employer]  [phone]

## 2025-02-25 LAB
MICROORGANISM SPEC CULT: NORMAL
MICROORGANISM SPEC CULT: NORMAL
SERVICE CMNT-IMP: NORMAL
SERVICE CMNT-IMP: NORMAL
SPECIMEN DESCRIPTION: NORMAL
SPECIMEN DESCRIPTION: NORMAL

## 2025-02-27 ENCOUNTER — HOSPITAL ENCOUNTER (INPATIENT)
Age: 65
LOS: 1 days | Discharge: HOME | DRG: 291 | End: 2025-03-01
Attending: EMERGENCY MEDICINE | Admitting: HOSPITALIST
Payer: MEDICARE

## 2025-02-27 ENCOUNTER — APPOINTMENT (OUTPATIENT)
Dept: GENERAL RADIOLOGY | Age: 65
DRG: 291 | End: 2025-02-27
Payer: MEDICARE

## 2025-02-27 DIAGNOSIS — I50.9 ACUTE ON CHRONIC CONGESTIVE HEART FAILURE, UNSPECIFIED HEART FAILURE TYPE (HCC): Primary | ICD-10-CM

## 2025-02-27 DIAGNOSIS — J18.9 PNEUMONIA DUE TO INFECTIOUS ORGANISM, UNSPECIFIED LATERALITY, UNSPECIFIED PART OF LUNG: ICD-10-CM

## 2025-02-27 PROCEDURE — 99285 EMERGENCY DEPT VISIT HI MDM: CPT

## 2025-02-27 PROCEDURE — 36415 COLL VENOUS BLD VENIPUNCTURE: CPT

## 2025-02-27 PROCEDURE — 71045 X-RAY EXAM CHEST 1 VIEW: CPT

## 2025-02-27 PROCEDURE — 84484 ASSAY OF TROPONIN QUANT: CPT

## 2025-02-27 PROCEDURE — 83880 ASSAY OF NATRIURETIC PEPTIDE: CPT

## 2025-02-27 PROCEDURE — 80048 BASIC METABOLIC PNL TOTAL CA: CPT

## 2025-02-27 PROCEDURE — 85025 COMPLETE CBC W/AUTO DIFF WBC: CPT

## 2025-02-27 PROCEDURE — 93005 ELECTROCARDIOGRAM TRACING: CPT

## 2025-02-27 PROCEDURE — 82805 BLOOD GASES W/O2 SATURATION: CPT

## 2025-02-27 ASSESSMENT — LIFESTYLE VARIABLES
HOW OFTEN DO YOU HAVE A DRINK CONTAINING ALCOHOL: NEVER
HOW MANY STANDARD DRINKS CONTAINING ALCOHOL DO YOU HAVE ON A TYPICAL DAY: PATIENT DOES NOT DRINK

## 2025-02-28 PROBLEM — I50.23 ACUTE ON CHRONIC HFREF (HEART FAILURE WITH REDUCED EJECTION FRACTION) (HCC): Status: ACTIVE | Noted: 2025-02-28

## 2025-02-28 PROBLEM — I50.21 ACUTE SYSTOLIC CHF (CONGESTIVE HEART FAILURE) (HCC): Status: ACTIVE | Noted: 2025-02-28

## 2025-02-28 LAB
ANION GAP SERPL CALCULATED.3IONS-SCNC: 11 MMOL/L (ref 9–16)
ANION GAP SERPL CALCULATED.3IONS-SCNC: 16 MMOL/L (ref 9–16)
B PARAP IS1001 DNA NPH QL NAA+NON-PROBE: NOT DETECTED
B PERT DNA SPEC QL NAA+PROBE: NOT DETECTED
BASOPHILS # BLD: 0.04 K/UL (ref 0–0.2)
BASOPHILS # BLD: 0.05 K/UL (ref 0–0.2)
BASOPHILS NFR BLD: 1 % (ref 0–2)
BASOPHILS NFR BLD: 1 % (ref 0–2)
BNP SERPL-MCNC: 7011 PG/ML (ref 0–125)
BODY TEMPERATURE: 37
BUN SERPL-MCNC: 15 MG/DL (ref 8–23)
BUN SERPL-MCNC: 16 MG/DL (ref 8–23)
C PNEUM DNA NPH QL NAA+NON-PROBE: NOT DETECTED
CALCIUM SERPL-MCNC: 7.7 MG/DL (ref 8.6–10.4)
CALCIUM SERPL-MCNC: 8.9 MG/DL (ref 8.6–10.4)
CHLORIDE SERPL-SCNC: 105 MMOL/L (ref 98–107)
CHLORIDE SERPL-SCNC: 108 MMOL/L (ref 98–107)
CO2 SERPL-SCNC: 18 MMOL/L (ref 20–31)
CO2 SERPL-SCNC: 25 MMOL/L (ref 20–31)
COHGB MFR BLD: 2.5 % (ref 0–5)
CREAT SERPL-MCNC: 0.9 MG/DL (ref 0.7–1.2)
CREAT SERPL-MCNC: 1 MG/DL (ref 0.7–1.2)
CRP SERPL HS-MCNC: 8.3 MG/L (ref 0–5)
EKG ATRIAL RATE: 91 BPM
EKG P AXIS: 56 DEGREES
EKG P-R INTERVAL: 164 MS
EKG Q-T INTERVAL: 382 MS
EKG QRS DURATION: 92 MS
EKG QTC CALCULATION (BAZETT): 469 MS
EKG R AXIS: -14 DEGREES
EKG T AXIS: 167 DEGREES
EKG VENTRICULAR RATE: 91 BPM
EOSINOPHIL # BLD: 0.03 K/UL (ref 0–0.44)
EOSINOPHIL # BLD: 0.04 K/UL (ref 0–0.44)
EOSINOPHILS RELATIVE PERCENT: 0 % (ref 1–4)
EOSINOPHILS RELATIVE PERCENT: 0 % (ref 1–4)
ERYTHROCYTE [DISTWIDTH] IN BLOOD BY AUTOMATED COUNT: 13.8 % (ref 11.8–14.4)
ERYTHROCYTE [DISTWIDTH] IN BLOOD BY AUTOMATED COUNT: 14 % (ref 11.8–14.4)
FIO2 ON VENT: ABNORMAL %
FLUAV RNA NPH QL NAA+NON-PROBE: NOT DETECTED
FLUBV RNA NPH QL NAA+NON-PROBE: NOT DETECTED
GFR, ESTIMATED: 84 ML/MIN/1.73M2
GFR, ESTIMATED: >90 ML/MIN/1.73M2
GLUCOSE SERPL-MCNC: 113 MG/DL (ref 74–99)
GLUCOSE SERPL-MCNC: 121 MG/DL (ref 74–99)
HADV DNA NPH QL NAA+NON-PROBE: NOT DETECTED
HCO3 VENOUS: 24.6 MMOL/L (ref 24–30)
HCOV 229E RNA NPH QL NAA+NON-PROBE: NOT DETECTED
HCOV HKU1 RNA NPH QL NAA+NON-PROBE: DETECTED
HCOV NL63 RNA NPH QL NAA+NON-PROBE: NOT DETECTED
HCOV OC43 RNA NPH QL NAA+NON-PROBE: NOT DETECTED
HCT VFR BLD AUTO: 35.4 % (ref 40.7–50.3)
HCT VFR BLD AUTO: 36.4 % (ref 40.7–50.3)
HGB BLD-MCNC: 11.8 G/DL (ref 13–17)
HGB BLD-MCNC: 12.4 G/DL (ref 13–17)
HMPV RNA NPH QL NAA+NON-PROBE: NOT DETECTED
HPIV1 RNA NPH QL NAA+NON-PROBE: NOT DETECTED
HPIV2 RNA NPH QL NAA+NON-PROBE: NOT DETECTED
HPIV3 RNA NPH QL NAA+NON-PROBE: NOT DETECTED
HPIV4 RNA NPH QL NAA+NON-PROBE: NOT DETECTED
IMM GRANULOCYTES # BLD AUTO: 0.05 K/UL (ref 0–0.3)
IMM GRANULOCYTES # BLD AUTO: 0.06 K/UL (ref 0–0.3)
IMM GRANULOCYTES NFR BLD: 1 %
IMM GRANULOCYTES NFR BLD: 1 %
LACTIC ACID, SEPSIS WHOLE BLOOD: 1.6 MMOL/L (ref 0.5–1.9)
LYMPHOCYTES NFR BLD: 1.33 K/UL (ref 1.1–3.7)
LYMPHOCYTES NFR BLD: 1.91 K/UL (ref 1.1–3.7)
LYMPHOCYTES RELATIVE PERCENT: 15 % (ref 24–43)
LYMPHOCYTES RELATIVE PERCENT: 19 % (ref 24–43)
M PNEUMO DNA NPH QL NAA+NON-PROBE: NOT DETECTED
M PNEUMO IGM SER QL IA: 0.27
MCH RBC QN AUTO: 33.1 PG (ref 25.2–33.5)
MCH RBC QN AUTO: 33.5 PG (ref 25.2–33.5)
MCHC RBC AUTO-ENTMCNC: 33.3 G/DL (ref 28.4–34.8)
MCHC RBC AUTO-ENTMCNC: 34.1 G/DL (ref 28.4–34.8)
MCV RBC AUTO: 98.4 FL (ref 82.6–102.9)
MCV RBC AUTO: 99.4 FL (ref 82.6–102.9)
MONOCYTES NFR BLD: 0.77 K/UL (ref 0.1–1.2)
MONOCYTES NFR BLD: 0.81 K/UL (ref 0.1–1.2)
MONOCYTES NFR BLD: 8 % (ref 3–12)
MONOCYTES NFR BLD: 9 % (ref 3–12)
MRSA, DNA, NASAL: NEGATIVE
NEUTROPHILS NFR BLD: 71 % (ref 36–65)
NEUTROPHILS NFR BLD: 74 % (ref 36–65)
NEUTS SEG NFR BLD: 6.58 K/UL (ref 1.5–8.1)
NEUTS SEG NFR BLD: 7.1 K/UL (ref 1.5–8.1)
NRBC BLD-RTO: 0 PER 100 WBC
NRBC BLD-RTO: 0 PER 100 WBC
O2 SAT, VEN: 68.5 % (ref 60–85)
PCO2 VENOUS: 38.2 MM HG (ref 39–55)
PH VENOUS: 7.43 (ref 7.32–7.42)
PLATELET # BLD AUTO: 316 K/UL (ref 138–453)
PLATELET # BLD AUTO: 365 K/UL (ref 138–453)
PMV BLD AUTO: 9.5 FL (ref 8.1–13.5)
PMV BLD AUTO: 9.8 FL (ref 8.1–13.5)
PO2 VENOUS: 34.8 MM HG (ref 30–50)
POSITIVE BASE EXCESS, VEN: 0.4 MMOL/L (ref 0–2)
POTASSIUM SERPL-SCNC: 3.7 MMOL/L (ref 3.7–5.3)
POTASSIUM SERPL-SCNC: 3.9 MMOL/L (ref 3.7–5.3)
PROCALCITONIN SERPL-MCNC: 0.06 NG/ML (ref 0–0.09)
RBC # BLD AUTO: 3.56 M/UL (ref 4.21–5.77)
RBC # BLD AUTO: 3.7 M/UL (ref 4.21–5.77)
RSV RNA NPH QL NAA+NON-PROBE: NOT DETECTED
RV+EV RNA NPH QL NAA+NON-PROBE: NOT DETECTED
SARS-COV-2 RNA NPH QL NAA+NON-PROBE: NOT DETECTED
SODIUM SERPL-SCNC: 141 MMOL/L (ref 136–145)
SODIUM SERPL-SCNC: 142 MMOL/L (ref 136–145)
SPECIMEN DESCRIPTION: ABNORMAL
SPECIMEN DESCRIPTION: NORMAL
TROPONIN I SERPL HS-MCNC: 109 NG/L (ref 0–22)
TROPONIN I SERPL HS-MCNC: 119 NG/L (ref 0–22)
WBC OTHER # BLD: 8.9 K/UL (ref 3.5–11.3)
WBC OTHER # BLD: 9.9 K/UL (ref 3.5–11.3)

## 2025-02-28 PROCEDURE — 96374 THER/PROPH/DIAG INJ IV PUSH: CPT

## 2025-02-28 PROCEDURE — 2500000003 HC RX 250 WO HCPCS

## 2025-02-28 PROCEDURE — 87641 MR-STAPH DNA AMP PROBE: CPT

## 2025-02-28 PROCEDURE — 6360000002 HC RX W HCPCS

## 2025-02-28 PROCEDURE — 94664 DEMO&/EVAL PT USE INHALER: CPT

## 2025-02-28 PROCEDURE — 6370000000 HC RX 637 (ALT 250 FOR IP)

## 2025-02-28 PROCEDURE — 0202U NFCT DS 22 TRGT SARS-COV-2: CPT

## 2025-02-28 PROCEDURE — 94640 AIRWAY INHALATION TREATMENT: CPT

## 2025-02-28 PROCEDURE — 93010 ELECTROCARDIOGRAM REPORT: CPT | Performed by: STUDENT IN AN ORGANIZED HEALTH CARE EDUCATION/TRAINING PROGRAM

## 2025-02-28 PROCEDURE — 86738 MYCOPLASMA ANTIBODY: CPT

## 2025-02-28 PROCEDURE — 36415 COLL VENOUS BLD VENIPUNCTURE: CPT

## 2025-02-28 PROCEDURE — 6370000000 HC RX 637 (ALT 250 FOR IP): Performed by: NURSE PRACTITIONER

## 2025-02-28 PROCEDURE — 84145 PROCALCITONIN (PCT): CPT

## 2025-02-28 PROCEDURE — 94761 N-INVAS EAR/PLS OXIMETRY MLT: CPT

## 2025-02-28 PROCEDURE — 86140 C-REACTIVE PROTEIN: CPT

## 2025-02-28 PROCEDURE — 2700000000 HC OXYGEN THERAPY PER DAY

## 2025-02-28 PROCEDURE — 83605 ASSAY OF LACTIC ACID: CPT

## 2025-02-28 PROCEDURE — 87040 BLOOD CULTURE FOR BACTERIA: CPT

## 2025-02-28 PROCEDURE — 1200000000 HC SEMI PRIVATE

## 2025-02-28 PROCEDURE — 99223 1ST HOSP IP/OBS HIGH 75: CPT | Performed by: INTERNAL MEDICINE

## 2025-02-28 PROCEDURE — 2580000003 HC RX 258

## 2025-02-28 PROCEDURE — 99223 1ST HOSP IP/OBS HIGH 75: CPT | Performed by: HOSPITALIST

## 2025-02-28 RX ORDER — ACETAMINOPHEN 650 MG/1
650 SUPPOSITORY RECTAL EVERY 6 HOURS PRN
Status: DISCONTINUED | OUTPATIENT
Start: 2025-02-28 | End: 2025-03-01 | Stop reason: HOSPADM

## 2025-02-28 RX ORDER — LABETALOL HYDROCHLORIDE 5 MG/ML
10 INJECTION, SOLUTION INTRAVENOUS EVERY 6 HOURS PRN
Status: DISCONTINUED | OUTPATIENT
Start: 2025-02-28 | End: 2025-03-01 | Stop reason: HOSPADM

## 2025-02-28 RX ORDER — IPRATROPIUM BROMIDE AND ALBUTEROL SULFATE 2.5; .5 MG/3ML; MG/3ML
1 SOLUTION RESPIRATORY (INHALATION)
Status: DISCONTINUED | OUTPATIENT
Start: 2025-02-28 | End: 2025-02-28

## 2025-02-28 RX ORDER — QUETIAPINE FUMARATE 50 MG/1
150 TABLET, EXTENDED RELEASE ORAL NIGHTLY
Status: DISCONTINUED | OUTPATIENT
Start: 2025-02-28 | End: 2025-03-01 | Stop reason: HOSPADM

## 2025-02-28 RX ORDER — POTASSIUM CHLORIDE 1500 MG/1
40 TABLET, EXTENDED RELEASE ORAL PRN
Status: DISCONTINUED | OUTPATIENT
Start: 2025-02-28 | End: 2025-03-01 | Stop reason: HOSPADM

## 2025-02-28 RX ORDER — TAMSULOSIN HYDROCHLORIDE 0.4 MG/1
0.4 CAPSULE ORAL DAILY
Status: DISCONTINUED | OUTPATIENT
Start: 2025-02-28 | End: 2025-03-01 | Stop reason: HOSPADM

## 2025-02-28 RX ORDER — ALBUTEROL SULFATE 90 UG/1
2 INHALANT RESPIRATORY (INHALATION) EVERY 4 HOURS PRN
Status: DISCONTINUED | OUTPATIENT
Start: 2025-02-28 | End: 2025-02-28

## 2025-02-28 RX ORDER — SPIRONOLACTONE 25 MG/1
12.5 TABLET ORAL DAILY
Status: DISCONTINUED | OUTPATIENT
Start: 2025-02-28 | End: 2025-03-01 | Stop reason: HOSPADM

## 2025-02-28 RX ORDER — SODIUM CHLORIDE 0.9 % (FLUSH) 0.9 %
5-40 SYRINGE (ML) INJECTION PRN
Status: DISCONTINUED | OUTPATIENT
Start: 2025-02-28 | End: 2025-03-01 | Stop reason: HOSPADM

## 2025-02-28 RX ORDER — FUROSEMIDE 10 MG/ML
40 INJECTION INTRAMUSCULAR; INTRAVENOUS 2 TIMES DAILY
Status: DISCONTINUED | OUTPATIENT
Start: 2025-02-28 | End: 2025-02-28

## 2025-02-28 RX ORDER — ONDANSETRON 2 MG/ML
4 INJECTION INTRAMUSCULAR; INTRAVENOUS EVERY 6 HOURS PRN
Status: DISCONTINUED | OUTPATIENT
Start: 2025-02-28 | End: 2025-03-01 | Stop reason: HOSPADM

## 2025-02-28 RX ORDER — BENZONATATE 100 MG/1
100 CAPSULE ORAL 3 TIMES DAILY PRN
Status: DISCONTINUED | OUTPATIENT
Start: 2025-02-28 | End: 2025-03-01 | Stop reason: HOSPADM

## 2025-02-28 RX ORDER — DIVALPROEX SODIUM 500 MG/1
500 TABLET, DELAYED RELEASE ORAL 2 TIMES DAILY
Status: DISCONTINUED | OUTPATIENT
Start: 2025-02-28 | End: 2025-03-01 | Stop reason: HOSPADM

## 2025-02-28 RX ORDER — MAGNESIUM SULFATE IN WATER 40 MG/ML
2000 INJECTION, SOLUTION INTRAVENOUS PRN
Status: DISCONTINUED | OUTPATIENT
Start: 2025-02-28 | End: 2025-03-01 | Stop reason: HOSPADM

## 2025-02-28 RX ORDER — METOPROLOL SUCCINATE 25 MG/1
12.5 TABLET, EXTENDED RELEASE ORAL DAILY
Status: DISCONTINUED | OUTPATIENT
Start: 2025-02-28 | End: 2025-03-01 | Stop reason: HOSPADM

## 2025-02-28 RX ORDER — LISINOPRIL 5 MG/1
10 TABLET ORAL DAILY
Status: DISCONTINUED | OUTPATIENT
Start: 2025-02-28 | End: 2025-03-01 | Stop reason: HOSPADM

## 2025-02-28 RX ORDER — SODIUM CHLORIDE 9 MG/ML
INJECTION, SOLUTION INTRAVENOUS PRN
Status: DISCONTINUED | OUTPATIENT
Start: 2025-02-28 | End: 2025-03-01 | Stop reason: HOSPADM

## 2025-02-28 RX ORDER — ONDANSETRON 4 MG/1
4 TABLET, ORALLY DISINTEGRATING ORAL EVERY 8 HOURS PRN
Status: DISCONTINUED | OUTPATIENT
Start: 2025-02-28 | End: 2025-03-01 | Stop reason: HOSPADM

## 2025-02-28 RX ORDER — ALBUTEROL SULFATE 0.83 MG/ML
2.5 SOLUTION RESPIRATORY (INHALATION) EVERY 4 HOURS PRN
Status: DISCONTINUED | OUTPATIENT
Start: 2025-02-28 | End: 2025-03-01 | Stop reason: HOSPADM

## 2025-02-28 RX ORDER — ACETAMINOPHEN 325 MG/1
650 TABLET ORAL EVERY 6 HOURS PRN
Status: DISCONTINUED | OUTPATIENT
Start: 2025-02-28 | End: 2025-03-01 | Stop reason: HOSPADM

## 2025-02-28 RX ORDER — FUROSEMIDE 10 MG/ML
40 INJECTION INTRAMUSCULAR; INTRAVENOUS 2 TIMES DAILY
Status: DISCONTINUED | OUTPATIENT
Start: 2025-02-28 | End: 2025-03-01 | Stop reason: HOSPADM

## 2025-02-28 RX ORDER — ENOXAPARIN SODIUM 100 MG/ML
40 INJECTION SUBCUTANEOUS DAILY
Status: DISCONTINUED | OUTPATIENT
Start: 2025-02-28 | End: 2025-03-01 | Stop reason: HOSPADM

## 2025-02-28 RX ORDER — GUAIFENESIN 600 MG/1
600 TABLET, EXTENDED RELEASE ORAL 2 TIMES DAILY
Status: DISCONTINUED | OUTPATIENT
Start: 2025-02-28 | End: 2025-03-01 | Stop reason: HOSPADM

## 2025-02-28 RX ORDER — POLYETHYLENE GLYCOL 3350 17 G/17G
17 POWDER, FOR SOLUTION ORAL DAILY PRN
Status: DISCONTINUED | OUTPATIENT
Start: 2025-02-28 | End: 2025-03-01 | Stop reason: HOSPADM

## 2025-02-28 RX ORDER — SODIUM CHLORIDE 0.9 % (FLUSH) 0.9 %
5-40 SYRINGE (ML) INJECTION EVERY 12 HOURS SCHEDULED
Status: DISCONTINUED | OUTPATIENT
Start: 2025-02-28 | End: 2025-03-01 | Stop reason: HOSPADM

## 2025-02-28 RX ORDER — POTASSIUM CHLORIDE 7.45 MG/ML
10 INJECTION INTRAVENOUS PRN
Status: DISCONTINUED | OUTPATIENT
Start: 2025-02-28 | End: 2025-03-01 | Stop reason: HOSPADM

## 2025-02-28 RX ADMIN — CEFEPIME 2000 MG: 2 INJECTION, POWDER, FOR SOLUTION INTRAVENOUS at 17:18

## 2025-02-28 RX ADMIN — SERTRALINE HYDROCHLORIDE 50 MG: 50 TABLET ORAL at 08:55

## 2025-02-28 RX ADMIN — BENZONATATE 100 MG: 100 CAPSULE ORAL at 04:34

## 2025-02-28 RX ADMIN — LISINOPRIL 10 MG: 10 TABLET ORAL at 09:40

## 2025-02-28 RX ADMIN — CEFEPIME 2000 MG: 2 INJECTION, POWDER, FOR SOLUTION INTRAVENOUS at 01:42

## 2025-02-28 RX ADMIN — DIVALPROEX SODIUM 500 MG: 500 TABLET, DELAYED RELEASE ORAL at 09:40

## 2025-02-28 RX ADMIN — ENOXAPARIN SODIUM 40 MG: 100 INJECTION SUBCUTANEOUS at 09:27

## 2025-02-28 RX ADMIN — GUAIFENESIN 600 MG: 600 TABLET, EXTENDED RELEASE ORAL at 21:43

## 2025-02-28 RX ADMIN — FUROSEMIDE 40 MG: 10 INJECTION, SOLUTION INTRAMUSCULAR; INTRAVENOUS at 04:34

## 2025-02-28 RX ADMIN — FUROSEMIDE 40 MG: 10 INJECTION, SOLUTION INTRAMUSCULAR; INTRAVENOUS at 17:19

## 2025-02-28 RX ADMIN — TAMSULOSIN HYDROCHLORIDE 0.4 MG: 0.4 CAPSULE ORAL at 09:27

## 2025-02-28 RX ADMIN — VANCOMYCIN HYDROCHLORIDE 1250 MG: 1.25 INJECTION, POWDER, LYOPHILIZED, FOR SOLUTION INTRAVENOUS at 02:33

## 2025-02-28 RX ADMIN — DIVALPROEX SODIUM 500 MG: 500 TABLET, DELAYED RELEASE ORAL at 21:43

## 2025-02-28 RX ADMIN — SPIRONOLACTONE 12.5 MG: 25 TABLET, FILM COATED ORAL at 09:28

## 2025-02-28 RX ADMIN — GUAIFENESIN 600 MG: 600 TABLET, EXTENDED RELEASE ORAL at 09:28

## 2025-02-28 RX ADMIN — QUETIAPINE FUMARATE 150 MG: 50 TABLET, EXTENDED RELEASE ORAL at 21:51

## 2025-02-28 RX ADMIN — SODIUM CHLORIDE, PRESERVATIVE FREE 10 ML: 5 INJECTION INTRAVENOUS at 09:27

## 2025-02-28 RX ADMIN — METOPROLOL SUCCINATE 12.5 MG: 25 TABLET, EXTENDED RELEASE ORAL at 09:27

## 2025-02-28 RX ADMIN — ALBUTEROL SULFATE 2.5 MG: 2.5 SOLUTION RESPIRATORY (INHALATION) at 12:49

## 2025-02-28 RX ADMIN — IPRATROPIUM BROMIDE AND ALBUTEROL SULFATE 1 DOSE: .5; 2.5 SOLUTION RESPIRATORY (INHALATION) at 01:27

## 2025-02-28 RX ADMIN — VANCOMYCIN HYDROCHLORIDE 750 MG: 750 INJECTION, POWDER, LYOPHILIZED, FOR SOLUTION INTRAVENOUS at 18:03

## 2025-02-28 RX ADMIN — FUROSEMIDE 40 MG: 10 INJECTION, SOLUTION INTRAMUSCULAR; INTRAVENOUS at 09:27

## 2025-02-28 RX ADMIN — SODIUM CHLORIDE, PRESERVATIVE FREE 10 ML: 5 INJECTION INTRAVENOUS at 21:44

## 2025-02-28 ASSESSMENT — ENCOUNTER SYMPTOMS
NAUSEA: 0
VOMITING: 0
SHORTNESS OF BREATH: 1
COUGH: 1

## 2025-02-28 NOTE — H&P
[facility]     Department of Internal Medicine - Staff Internal Medicine Teaching Service          ADMISSION NOTE/HISTORY AND PHYSICAL EXAMINATION   Date: 2/28/2025  Patient Name: James Gabriel  Date of admission: 2/27/2025 11:42 PM  YOB: 1960  PCP: Ishan Feliz MD  History Obtained From:  patient    CHIEF COMPLAINT     Chief complaint: SOB    HISTORY OF PRESENTING ILLNESS     The patient is a pleasant 64 y.o. male with a PMHx significant for     Chronic HFrEF with EF 20%  CKD  History of side effect of her bipolar disorder  History of BPH  History of multiple hospitalization recently, mostly leaves AMA    presents with a chief complaint of shortness of breath. As per the patient shortness of breath was sudden in onset associated with some chest pain and productive cough that woke him from sleep.  He denies any fever, chills, nausea, vomiting, diaphoresis, any sick contact.    Please note that the patient was recently evaluated and admitted 2/2025 for concerns of similar complaints shortness of breath and found to have acute on chronic CHF exacerbation.  Patient was being evaluated and he is TTE was suggestive of severe systolic dysfunction with EF of 20 to 25%.  Before his evaluation could be completed with TORI as recommend by cardiology patient left AMA.  Also previously was hospitalized in [facility] where he left AMA after the heart cath.    Today in the ED patient was AOx4, found to be saturating fine on room air.  His labs were unremarkable.  His chest x-ray was suggestive of similar diffuse bilateral airspace opacities as seen on previous admission.  Patient was admitted tachycardic slightly tachypneic, elevated lactic acid 1.6 and was meeting SIRS criteria so was started on antibiotics for possible concerns of pneumonia in the ED. Internal medicine was consulted for concerns of acute CHF exacerbation and possible concerns for pneumonia    On my

## 2025-02-28 NOTE — ED PROVIDER NOTES
[facility] EMERGENCY DEPARTMENT  Emergency Department Encounter  Emergency Medicine Resident     Pt Name:James Gabriel  MRN: [id number]  Birthdate 1960  Date of evaluation: 2/27/25  PCP:  Ishan Feliz MD  Note Started: 11:46 PM EST      CHIEF COMPLAINT       Chief Complaint   Patient presents with    Shortness of Breath       HISTORY OF PRESENT ILLNESS  (Location/Symptom, Timing/Onset, Context/Setting, Quality, Duration, Modifying Factors, Severity.)      James Gabriel is a 64 y.o. male who presents with shortness of breath.  States that the shortness of breath woke him up from sleep.  Endorses associated midsternal chest pain.  Denies any associated nausea, vomiting or diaphoresis.  States he has had a productive cough, but denies any fever or chills.    Patient was seen in the emergency department on 2/21/2025 due to shortness of breath, hemoptysis.  Patient was admitted to the internal medicine service.  Due to pneumonia patient was on IV antibiotics.  Patient did have a TTE which showed severe systolic dysfunction with EF of 20 to 25% and global hypokinesis.  Was started on IV Lasix 40 mg.  However patient left AMA prior to completion of medical care.    Patient was also seen at [facility] on 2/28/2025.  Was admitted for decompensated CHF with interstitial pulmonary edema and small left pleural effusion with superimposed pneumonia.  Patient had coronary angiography which showed normal coronary arteries.  Patient did leave AMA.    PAST MEDICAL / SURGICAL / SOCIAL / FAMILY HISTORY      has a past medical history of Chronic kidney disease, Degenerative disk disease, Depression, Hematuria, Paranoia (HCC), and Schizophrenia ([facility]).       has a past surgical history that includes hernia repair (3/2010); Cystoscopy (12/18/12); Ureteroscopy; and Lithotripsy (12/18/12).      Social History     Socioeconomic History    Marital status:      Spouse name: Not on file    Number of  EKG's are interpreted by the Emergency Department Physician who either signs or Co-signs this chart in the absence of a cardiologist.    EMERGENCY DEPARTMENT COURSE:       ED Course as of 02/28/25 0346   Fri Feb 28, 2025   0028 XR CHEST PORTABLE  IMPRESSION:  Similar appearing diffuse bilateral airspace opacities, left greater than  right.  Findings may be on the basis of pulmonary edema.  Superimposed  infection is difficult to fully exclude.     Small left pleural effusion.     Stable cardiomegaly.   [MW]   0028 Lactic Acid, Sepsis, Whole Blood: 1.6 [MW]   0158 Patient was found to have elevated troponin however this is not elevated from his baseline.  Did have elevated BNP as well as chest x-ray which showed pulmonary edema and superimposed infection.  Due to this patient will require admission for CHF exacerbation and pneumonia.  Did give patient a dose of vancomycin and cefepime.  Will be admitted to the internal medicine service. [MW]      ED Course User Index  [MW] Ed Pelletier MD       PROCEDURES:      CONSULTS:  PHARMACY TO DOSE VANCOMYCIN  IP CONSULT TO INTERNAL MEDICINE  IP CONSULT TO CARDIOLOGY    CRITICAL CARE:  There was significant risk of life threatening deterioration of patient's condition requiring my direct management. Critical care time  minutes, excluding any documented procedures.    FINAL IMPRESSION      1. Acute on chronic congestive heart failure, unspecified heart failure type (HCC)    2. Pneumonia due to infectious organism, unspecified laterality, unspecified part of lung          DISPOSITION / PLAN     DISPOSITION Admitted 02/28/2025 02:49:22 AM               PATIENT REFERRED TO:  No follow-up provider specified.    DISCHARGE MEDICATIONS:  New Prescriptions    No medications on file       Ed Pelletier MD  Emergency Medicine Resident    (Please note that portions of this note were completed with a voice recognition program.  Efforts were made to edit the dictations but

## 2025-02-28 NOTE — ED NOTES
ED to inpatient nurses report      Chief Complaint:  Chief Complaint   Patient presents with    Shortness of Breath     Present to ED from: Home    MOA:     LOC: alert and orientated to name, place, date  Mobility: Independent  Oxygen Baseline: RA    Current needs required: 4L O2   Pending ED orders: na  Present condition: Stable    Why did the patient come to the ED? pt c/o SOB.   Pt states he was recently seen here for the SOB.   Pt denies COPD, CHF, or asthma.   Pt denies wearing o2 at home.   Pt states the Sob woke him out of his sleep and he felt like he couldn't catch his breath.   When pt was seen here a couple days ago he left AMA.   What is the plan? ABX for pneumonia, O2 control  Any procedures or intervention occur? None at this time  Any safety concerns??Fall Risk    Mental Status:       Psych Assessment:   Psychosocial  Psychosocial (WDL): Within Defined Limits  Vital signs   Vitals:    02/27/25 2344 02/27/25 2345 02/27/25 2346 02/28/25 0128   BP:  (!) 151/82     Pulse: 93   95   Resp: 27 28   Temp:   97.4 °F (36.3 °C)    TempSrc:   Oral    SpO2: 92%   93%        Vitals:  Patient Vitals for the past 24 hrs:   BP Temp Temp src Pulse Resp SpO2   02/28/25 0128 -- -- -- 95 28 93 %   02/27/25 2346 -- 97.4 °F (36.3 °C) Oral -- -- --   02/27/25 2345 (!) 151/82 -- -- -- -- --   02/27/25 2344 -- -- -- 93 27 92 %      Visit Vitals  BP (!) 151/82   Pulse 95   Temp 97.4 °F (36.3 °C) (Oral)   Resp 28   SpO2 93%        LDAs:   Peripheral IV 02/28/25 Right Forearm (Active)       Ambulatory Status:  Presents to emergency department  because of falls (Syncope, seizure, or loss of consciousness): No, Age > 70: No, Altered Mental Status, Intoxication with alcohol or substance confusion (Disorientation, impaired judgment, poor safety awaremess, or inability to follow instructions): No, Impaired Mobility: Ambulates or transfers with assistive devices or assistance; Unable to ambulate or transer.: No, Nursing Judgement:    BLOOD GAS, VENOUS - Abnormal; Notable for the following components:    pH, Dwain 7.426 (*)     pCO2, Dwain 38.2 (*)     All other components within normal limits   CULTURE, BLOOD 1   CULTURE, BLOOD 1   LACTATE, SEPSIS   LACTATE, SEPSIS       Electronically signed by Nora Patel RN on 2/28/2025 at 1:47 AM

## 2025-02-28 NOTE — RT PROTOCOL NOTE
RT Nebulizer Bronchodilator Protocol Note    There is a bronchodilator order in the chart from a provider indicating to follow the RT Bronchodilator Protocol and there is an “Initiate RT Bronchodilator Protocol” order as well (see protocol at bottom of note).    CXR Findings:  XR CHEST PORTABLE    Result Date: 2/28/2025  Similar appearing diffuse bilateral airspace opacities, left greater than right.  Findings may be on the basis of pulmonary edema.  Superimposed infection is difficult to fully exclude. Small left pleural effusion. Stable cardiomegaly.       The findings from the last RT Protocol Assessment were as follows:  Smoking: None or smoker <15 pack years  Respiratory Pattern: Dyspnea on exertion or RR 21-25 bpm  Breath Sounds: Clear breath sounds  Cough: Strong, spontaneous, non-productive  Indication for Bronchodilator Therapy:    Bronchodilator Assessment Score: 2    Aerosolized bronchodilator medication orders have been revised according to the RT Nebulizer Bronchodilator Protocol below.    Respiratory Therapist to perform RT Therapy Protocol Assessment initially then follow the protocol.  Repeat RT Therapy Protocol Assessment PRN for score 0-3 or on second treatment, BID, and PRN for scores above 3.    No Indications - adjust the frequency to every 6 hours PRN wheezing or bronchospasm, if no treatments needed after 48 hours then discontinue using Per Protocol order mode.     If indication present, adjust the RT bronchodilator orders based on the Bronchodilator Assessment Score as indicated below.  If a patient is on this medication at home then do not decrease Frequency below that used at home.    0-3 - enter or revise RT bronchodilator order(s) to equivalent RT Bronchodilator order with Frequency of every 4 hours PRN for wheezing or increased work of breathing using Per Protocol order mode.       4-6 - enter or revise RT Bronchodilator order(s) to two equivalent RT bronchodilator orders with one order

## 2025-02-28 NOTE — PROGRESS NOTES
ED to inpatient nurses report    Chief Complaint   Patient presents with    Shortness of Breath      Present to ED from home  LOC: alert and orientated to name, place, date  Vital signs   Vitals:    02/28/25 1000 02/28/25 1250 02/28/25 1345 02/28/25 1350   BP: (!) 143/84  (!) 150/79    Pulse: 90 92 97 88   Resp: 25 22 27 19   Temp:       TempSrc:       SpO2: 97% 93%  93%      Oxygen Baseline RA    Current needs required 6 liters humidified  LDAs:   Peripheral IV 02/28/25 Right Forearm (Active)   Site Assessment Clean, dry & intact 02/28/25 1200   Line Status Capped;Normal saline locked 02/28/25 1200   Phlebitis Assessment No symptoms 02/28/25 1200   Infiltration Assessment 0 02/28/25 1200   Alcohol Cap Used Yes 02/28/25 1200   Dressing Status Clean, dry & intact 02/28/25 1200   Dressing Type Transparent 02/28/25 1200     Mobility: Independent  Pending ED orders: none  Present condition: stable  Code Status: [unfilled]   Consults:  []  Hospitalist  Completed  [] yes [] no  [x]  Medicine  Completed  [] yes [] No  [x]  Cardiology  Completed  [] yes [] No  []  GI   Completed  [] yes [] No  []  Neurology  Completed  [] yes [] No  []  Nephrology Completed  [] yes [] No  []  Vascular  Completed  [] yes [] No   []  Surgery  Completed  [] yes [] No   []  Urology  Completed  [] yes [] No   []  Plastics  Completed  [] yes [] No   []  ENT  Completed  [] yes [] No   []  Other   Completed  [] yes [] No  Pertinent event(s) up to bathroom frequently, Desats when oxygen removed to 79% also becomes tachycardic  Pertinent event(s) frustrated with wait times for assistance.  C/O being too weak to eat and refusing to be fed.  Ensure ordered  Electronically signed by AMALIA GALEANA RN on 2/28/2025 at 4:21 PM

## 2025-02-28 NOTE — PROGRESS NOTES
[facility]   Pharmacy Pharmacokinetic Monitoring Service - Vancomycin     James Gabriel is a 64 y.o. male starting on vancomycin therapy for Pneumonia. Pharmacy consulted by Ed Leija  for monitoring and adjustment.    Target Concentration: Goal AUC/RAMON 400-600 mg*hr/L    Additional Antimicrobials: Cefepime    Pertinent Laboratory Values:   Wt Readings from Last 1 Encounters:   02/24/25 55.4 kg (122 lb 2.2 oz)     Temp Readings from Last 1 Encounters:   02/27/25 97.4 °F (36.3 °C) (Oral)     Estimated Creatinine Clearance: 58 mL/min (based on SCr of 1 mg/dL).  Recent Labs     02/27/25  2355   CREATININE 1.0   BUN 16   WBC 8.9     Procalcitonin:     Pertinent Cultures:  Culture Date Source Results        MRSA Nasal Swab: not ordered. Order placed by pharmacy.    Plan:  Dosing recommendations based on Bayesian software  Start vancomycin 1250 mg IV x1 dose now, then followed by vancomycin 750 mg IV every 12 hours  Anticipated AUC of 557 and trough concentration of 16.2 at steady state  Renal labs as indicated    Pharmacy will continue to monitor patient and adjust therapy as indicated    Thank you for the consult,  Diego Ewing RPH  2/28/2025 1:24 AM

## 2025-02-28 NOTE — ED NOTES
Pt arrives via EMS, pt c/o SOB.   Pt states he was recently seen here for the SOB.   Pt denies COPD, CHF, or asthma.   Pt denies wearing o2 at home.   Pt states the Sob woke him out of his sleep and he felt like he couldn't catch his breath.   When pt was seen here a couple days ago he left AMA.   Pt is A+Ox4, call light in reach.

## 2025-02-28 NOTE — PROGRESS NOTES
[facility]   Progress Note                   Date:   2/28/2025  Patient name: James Gabriel  Date of admission:  2/27/2025 11:42 PM  MRN:   [id number]  YOB: 1960  PCP: Ishan Feliz MD    Reason for Admission: Acute on chronic HFrEF (heart failure with reduced ejection fraction) ([facility]) [I50.23]    Subjective:       Clinical Changes / Abnormalities:Pt seen and examined in the room.   Pt very angry in room.  Cussing and yelling at me.  Pt states that he does not want to be bothered.   Labs, vitals and tele reviewed-      -924 ml since admission   Medications:   Scheduled Meds:   cefepime  2,000 mg IntraVENous Q12H    vancomycin  750 mg IntraVENous Q12H    vancomycin (VANCOCIN) intermittent dosing (placeholder)   Other RX Placeholder    sodium chloride flush  5-40 mL IntraVENous 2 times per day    enoxaparin  40 mg SubCUTAneous Daily    divalproex  500 mg Oral BID    lisinopril  10 mg Oral Daily    QUEtiapine  150 mg Oral Nightly    sertraline  50 mg Oral Daily    tamsulosin  0.4 mg Oral Daily    metoprolol succinate  12.5 mg Oral Daily    spironolactone  12.5 mg Oral Daily    furosemide  40 mg IntraVENous BID    guaiFENesin  600 mg Oral BID     Continuous Infusions:   sodium chloride       CBC:   Recent Labs     02/27/25 2355   WBC 8.9   HGB 11.8*        BMP:    Recent Labs     02/27/25 2355 02/28/25  0259    142   K 3.9 3.7    108*   CO2 25 18*   BUN 16 15   CREATININE 1.0 0.9   GLUCOSE 113* 121*     Hepatic: No results for input(s): \"AST\", \"ALT\", \"BILITOT\", \"ALKPHOS\" in the last 72 hours.    Invalid input(s): \"ALB\"  Troponin:   Recent Labs     02/27/25 2355 02/28/25  0104   TROPHS 119* 109*     BNP: No results for input(s): \"BNP\" in the last 72 hours.  Lipids: No results for input(s): \"CHOL\", \"HDL\" in the last 72 hours.    Invalid input(s): \"LDLCALCU\"  INR: No results for input(s): \"INR\" in the last 72 hours.      EKG:      Normal sinus rhythm  Left axis

## 2025-02-28 NOTE — ED PROVIDER NOTES
[facility]   Emergency Department  Faculty Attestation       I performed a history and physical examination of the patient and discussed management with the resident. I reviewed the resident’s note and agree with the documented findings including all diagnostic interpretations and plan of care. Any areas of disagreement are noted on the chart. I was personally present for the key portions of any procedures. I have documented in the chart those procedures where I was not present during the key portions. I have reviewed the emergency nurses triage note. I agree with the chief complaint, past medical history, past surgical history, allergies, medications, social and family history as documented unless otherwise noted below.  For Physician Assistant/ Nurse Practitioner cases/documentation I have personally evaluated this patient and have completed at least one if not all key elements of the E/M (history, physical exam, and MDM). Additional findings are as noted.    Patient Name: James Gabriel  MRN: [id number]  : 1960  Primary Care Physician: Ishan Feliz MD    Date of evaluationa: 2025   Note Started: 12:24 AM EST    Pertinent Comments     Chief Complaint:   Chief Complaint   Patient presents with    Shortness of Breath        Initial vitals: (If not listed, please see nursing documentation)  ED Triage Vitals   BP Systolic BP Percentile Diastolic BP Percentile Temp Temp Source Pulse Respirations SpO2   25 2345 -- -- 25 2346 25 2346 25 2344 25 2344 25 2344   (!) 151/82   97.4 °F (36.3 °C) Oral 93 27 92 %      Height Weight         -- --                        HPI/PE/Impression:  This is a 64 y.o. male who presents to the Emergency Department shortness of breath, nasal congestion, and leg swelling.  Patient has a history of CHF recent pneumonia.  Has been admitted multiple times but left AMA.  Patient declines that he wears oxygen at home.  On exam he  is on 2 L.  Saturating in the 92 to 93%.  Talking full sentences.  Heart sounds regular.  Crackles in bilateral bases.  Abdomen soft nontender.  Bilateral pitting edema.    Medical Decision Making  Shortness of breath, likely CHF exacerbation  Basic labs cardiac workup  Plan for likely admit    Amount and/or Complexity of Data Reviewed  Labs: ordered.  Radiology: ordered.  ECG/medicine tests: ordered.       EKG Interpretation    Interpreted by emergency department physician    Clinical Impression: Sinus rhythm with nonspecific T wave inversions in V6    Dottie Mahoney MD    Critical Care: None     Dottie Mahoney MD  Attending Emergency Physician         Dottie Mahoney MD  02/28/25 0025

## 2025-02-28 NOTE — CONSULTS
sulfate HFA (PROVENTIL;VENTOLIN;PROAIR) 108 (90 Base) MCG/ACT inhaler 2 puff, 2 puff, Inhalation, Q4H PRN  divalproex (DEPAKOTE) DR tablet 500 mg, 500 mg, Oral, BID  lisinopril (PRINIVIL;ZESTRIL) tablet 10 mg, 10 mg, Oral, Daily  QUEtiapine (SEROQUEL XR) extended release tablet 150 mg, 150 mg, Oral, Nightly  sertraline (ZOLOFT) tablet 50 mg, 50 mg, Oral, Daily  tamsulosin (FLOMAX) capsule 0.4 mg, 0.4 mg, Oral, Daily  metoprolol succinate (TOPROL XL) extended release tablet 12.5 mg, 12.5 mg, Oral, Daily  spironolactone (ALDACTONE) tablet 12.5 mg, 12.5 mg, Oral, Daily  labetalol (NORMODYNE;TRANDATE) injection 10 mg, 10 mg, IntraVENous, Q6H PRN  furosemide (LASIX) injection 40 mg, 40 mg, IntraVENous, BID  guaiFENesin (MUCINEX) extended release tablet 600 mg, 600 mg, Oral, BID  benzonatate (TESSALON) capsule 100 mg, 100 mg, Oral, TID PRN    Allergies:  Reglan [metoclopramide] and Pcn [penicillins]    Social History:   reports that he has been smoking cigarettes. He has never used smokeless tobacco. He reports current alcohol use of about 21.0 standard drinks of alcohol per week. He reports that he does not use drugs.     Family History: family history is not on file. No h/o sudden cardiac death.No for premature CAD    REVIEW OF SYSTEMS:    Constitutional: there has been no unanticipated weight loss. There's been No change in energy level, No change in activity level.     Cardiovascular: Per HPI  Respiratory: Shortness of breath  Gastrointestinal: No abdominal pain.  No change in bowel or bladder habits.  Genitourinary: No dysuria, trouble voiding, or hematuria.  Musculoskeletal:  No gait disturbance, No weakness or joint complaints.  Integumentary: No rash or pruritis.  Neurological: No headache, diplopia, change in muscle strength, numbness or tingling. No change in gait, balance, coordination, mood, affect, memory, mentation, behavior.        PHYSICAL EXAM:      /86   Pulse 97   Temp 97.4 °F (36.3 °C) (Oral)   for CHF - agree with lasix 40 mg iv BID.  Final recommendations per attending on rounds.        Discussed with patient and Nurse.    Electronically signed by Wendy Sun MD on 2/28/2025

## 2025-03-01 VITALS
BODY MASS INDEX: 22.66 KG/M2 | WEIGHT: 144.4 LBS | DIASTOLIC BLOOD PRESSURE: 84 MMHG | TEMPERATURE: 97.3 F | RESPIRATION RATE: 20 BRPM | HEART RATE: 84 BPM | OXYGEN SATURATION: 92 % | SYSTOLIC BLOOD PRESSURE: 128 MMHG

## 2025-03-01 PROCEDURE — 2580000003 HC RX 258

## 2025-03-01 PROCEDURE — 6360000002 HC RX W HCPCS

## 2025-03-01 PROCEDURE — 2500000003 HC RX 250 WO HCPCS

## 2025-03-01 PROCEDURE — 6370000000 HC RX 637 (ALT 250 FOR IP)

## 2025-03-01 PROCEDURE — 99233 SBSQ HOSP IP/OBS HIGH 50: CPT | Performed by: HOSPITALIST

## 2025-03-01 PROCEDURE — 99233 SBSQ HOSP IP/OBS HIGH 50: CPT | Performed by: NURSE PRACTITIONER

## 2025-03-01 RX ORDER — SPIRONOLACTONE 25 MG/1
12.5 TABLET ORAL DAILY
Qty: 30 TABLET | Refills: 3 | Status: SHIPPED | OUTPATIENT
Start: 2025-03-01

## 2025-03-01 RX ORDER — METOPROLOL SUCCINATE 25 MG/1
12.5 TABLET, EXTENDED RELEASE ORAL DAILY
Qty: 30 TABLET | Refills: 3 | Status: SHIPPED | OUTPATIENT
Start: 2025-03-01

## 2025-03-01 RX ORDER — GUAIFENESIN 600 MG/1
600 TABLET, EXTENDED RELEASE ORAL 2 TIMES DAILY
Qty: 20 TABLET | Refills: 0 | Status: SHIPPED | OUTPATIENT
Start: 2025-03-01 | End: 2025-03-11

## 2025-03-01 RX ORDER — BENZONATATE 100 MG/1
100 CAPSULE ORAL 3 TIMES DAILY PRN
Qty: 21 CAPSULE | Refills: 0 | Status: SHIPPED | OUTPATIENT
Start: 2025-03-01 | End: 2025-03-08

## 2025-03-01 RX ORDER — FUROSEMIDE 20 MG/1
20 TABLET ORAL DAILY
Qty: 60 TABLET | Refills: 3 | Status: SHIPPED | OUTPATIENT
Start: 2025-03-01

## 2025-03-01 RX ADMIN — METOPROLOL SUCCINATE 12.5 MG: 25 TABLET, EXTENDED RELEASE ORAL at 08:39

## 2025-03-01 RX ADMIN — SPIRONOLACTONE 12.5 MG: 25 TABLET, FILM COATED ORAL at 08:38

## 2025-03-01 RX ADMIN — SERTRALINE HYDROCHLORIDE 50 MG: 50 TABLET ORAL at 08:39

## 2025-03-01 RX ADMIN — VANCOMYCIN HYDROCHLORIDE 750 MG: 750 INJECTION, POWDER, LYOPHILIZED, FOR SOLUTION INTRAVENOUS at 05:43

## 2025-03-01 RX ADMIN — FUROSEMIDE 40 MG: 10 INJECTION, SOLUTION INTRAMUSCULAR; INTRAVENOUS at 08:38

## 2025-03-01 RX ADMIN — ENOXAPARIN SODIUM 40 MG: 100 INJECTION SUBCUTANEOUS at 08:38

## 2025-03-01 RX ADMIN — SODIUM CHLORIDE, PRESERVATIVE FREE 10 ML: 5 INJECTION INTRAVENOUS at 08:39

## 2025-03-01 RX ADMIN — LISINOPRIL 10 MG: 10 TABLET ORAL at 08:38

## 2025-03-01 RX ADMIN — GUAIFENESIN 600 MG: 600 TABLET, EXTENDED RELEASE ORAL at 08:39

## 2025-03-01 RX ADMIN — TAMSULOSIN HYDROCHLORIDE 0.4 MG: 0.4 CAPSULE ORAL at 10:02

## 2025-03-01 RX ADMIN — CEFEPIME 2000 MG: 2 INJECTION, POWDER, FOR SOLUTION INTRAVENOUS at 05:02

## 2025-03-01 RX ADMIN — DIVALPROEX SODIUM 500 MG: 500 TABLET, DELAYED RELEASE ORAL at 08:39

## 2025-03-01 NOTE — PROGRESS NOTES
RT Evaluation for Home Oxygen    Resting (Room Air):  SpO2:  92      Resting (On O2):  SpO2:   94  HR:  78  O2 Device:  nasla canula  O2 Flow Rate (L/min):  3.5            Additional Comments: Patient refused to participate in evaluation once tod he would have to walk around on room air to see if he qualifies. Patient stated That he wants to speak to doctor. Education was perform to explain why I am requiring the Room air walk to assess for oxygen need, he continued to refuse.     Electronically signed by Tawanda Solomon RCP on 3/1/2025 at 11:57 AM

## 2025-03-01 NOTE — PROGRESS NOTES
Writer attempted to complete admission, patient became highly agitated and began yelling at writer. Writer attempted to reassure patient without effect.

## 2025-03-01 NOTE — PROGRESS NOTES
Respiratory in to complete home oxygen evaluation, patient refused to complete entire evaluation. Writer attempted to educate patient, patient continues refusal. Primary notified

## 2025-03-01 NOTE — PLAN OF CARE
Problem: Chronic Conditions and Co-morbidities  Goal: Patient's chronic conditions and co-morbidity symptoms are monitored and maintained or improved  Outcome: Progressing     Problem: Respiratory - Adult  Goal: Achieves optimal ventilation and oxygenation  Outcome: Progressing     Problem: Cardiovascular - Adult  Goal: Maintains optimal cardiac output and hemodynamic stability  Outcome: Progressing  Goal: Absence of cardiac dysrhythmias or at baseline  Outcome: Progressing

## 2025-03-01 NOTE — DISCHARGE SUMMARY
Patient walking down hallway, informed nurse he got a ride and was leaving. Room checked, all personal belongings removed

## 2025-03-01 NOTE — CARE COORDINATION
Case Management Assessment  Initial Evaluation    Date/Time of Evaluation: 3/1/2025 8:16 AM  Assessment Completed by: Bridget Campos RN    If patient is discharged prior to next notation, then this note serves as note for discharge by case management.    Patient Name: James Gabriel                   YOB: 1960  Diagnosis: Pneumonia due to infectious organism, unspecified laterality, unspecified part of lung [J18.9]  Acute on chronic HFrEF (heart failure with reduced ejection fraction) ([facility]) [I50.23]  Acute on chronic congestive heart failure, unspecified heart failure type (HCC) [I50.9]                   Date / Time: 2/27/2025 11:42 PM    Patient Admission Status: Inpatient   Readmission Risk (Low < 19, Mod (19-27), High > 27): Readmission Risk Score: 15.7    Current PCP: Ishan Feliz MD  PCP verified by CM? Yes    Chart Reviewed: Yes      History Provided by: Patient, Medical Record  Patient Orientation: Alert and Oriented    Patient Cognition: Alert    Hospitalization in the last 30 days (Readmission):  Yes    If yes, Readmission Assessment in CM Navigator will be completed.    Advance Directives:      Code Status: Full Code   Patient's Primary Decision Maker is: Legal Next of Kin      Discharge Planning:    Patient lives with: Alone Type of Home: House  Primary Care Giver: Self  Patient Support Systems include: Children, Other (Comment) (ex wife)   Current Financial resources: Medicare  Current community resources: None  Current services prior to admission: None            Current DME:              Type of Home Care services:  (P) None    ADLS  Prior functional level: Independent in ADLs/IADLs  Current functional level: Independent in ADLs/IADLs    PT AM-PAC:   /24  OT AM-PAC:   /24    Family can provide assistance at DC: No  Would you like Case Management to discuss the discharge plan with any other family members/significant others, and if so, who? No  Plans to Return to Present Housing:  Yes  Other Identified Issues/Barriers to RETURNING to current housing: none  Potential Assistance needed at discharge: Durable Medical Equipment            Potential DME: Oxygen Therapy (Comment)  Patient expects to discharge to: (P) House  Plan for transportation at discharge: (P) Self    Financial    Payor: MEDICARE / Plan: MEDICARE PART A AND B / Product Type: *No Product type* /     Does insurance require precert for SNF: No    Potential assistance Purchasing Medications: (P) No  Meds-to-Beds request:        [id number] - [place], [place] - [address] - P [phone] - F [phone]  [address]  [facility] [address]  Phone: [phone] Fax: [phone]      Notes:    Factors facilitating achievement of predicted outcomes: Family support    Barriers to discharge: Limited family support and Stairs at home    Additional Case Management Notes: Transitional planning: Plan is home independently. Has a ride. Address, emergency contacts, PCP and insurance confirmed with patient. Patient denies any needs.    The Plan for Transition of Care is related to the following treatment goals of Pneumonia due to infectious organism, unspecified laterality, unspecified part of lung [J18.9]  Acute on chronic HFrEF (heart failure with reduced ejection fraction) (HCC) [I50.23]  Acute on chronic congestive heart failure, unspecified heart failure type (HCC) [I50.9]    IF APPLICABLE: The Patient and/or patient representative James and his family were provided with a choice of provider and agrees with the discharge plan. Freedom of choice list with basic dialogue that supports the patient's individualized plan of care/goals and shares the quality data associated with the providers was provided to: (P) Patient   Patient Representative Name:       The Patient and/or Patient Representative Agree with the Discharge Plan? (P) Yes    Bridget Campos RN  Case Management Department  Ph: [phone] Fax: [phone]

## 2025-03-01 NOTE — PLAN OF CARE
I had a lengthy discussion with the patient about the need for oxygen at home. He was desaturating when he was on room air. Attempted to have a home oxygen evaluation but pt refused this to be done. I discussed with patient that I do not feel comfortable medically discharging him if he does not undergo a home oxygen evaluation. He expressed understanding and has signed out AMA. He is of sound mind and understands his risk of leaving the hospital against medical advice.  Patient to follow up with a primary care physician or return to the Emergency Department if symptoms worsen.     Marcelina Concepcion MD  Internal Medicine Resident, PGY-1  [place], [place]  3/1/2025,12:47 PM

## 2025-03-01 NOTE — PROGRESS NOTES
Writer informed Dr. Woo he was leaving AMA, writer attempted to have patient sign AMA, patient refused. Stated, \"I ain't signing no paper, I will leave when I am ready, I am waiting for my wife to get home and I'm gonna call an ambulance.\" Writer notified house supervisor, will notify security as directed

## 2025-03-01 NOTE — PROGRESS NOTES
[facility]  Internal Medicine Teaching Residency Program  Inpatient Daily Progress Note  ______________________________________________________________________________    Patient: James Gabriel  YOB: 1960   MRN:[id number]    Acct: [id number]     Room: 0338/0338-02  Admit date: 2/27/2025  Today's date: 03/01/25  Number of days in the hospital: 1    SUBJECTIVE   Admitting Diagnosis: Acute systolic CHF (congestive heart failure) (HCC)  CC: SOB    Patient seen and evaluated at bedside.  No acute events overnight.  Patient remains on 4 L NC O2.  Patient refused morning labs and vitals check.  Will reevaluate O2 need after checking vitals and wean as tolerated.  No acute complaints today.  Mild wheezing.  TORI to be done as outpatient.  Discharge planning pending O2 requirement.  Will need all medications prescribed on discharge.     BRIEF HISTORY     The patient is a pleasant 64 y.o. male with a PMHx significant for      Chronic HFrEF with EF 20%  CKD  History of side effect of her bipolar disorder  History of BPH  History of multiple hospitalization recently, mostly leaves AMA     presents with a chief complaint of shortness of breath. As per the patient shortness of breath was sudden in onset associated with some chest pain and productive cough that woke him from sleep.  He denies any fever, chills, nausea, vomiting, diaphoresis, any sick contact.     Please note that the patient was recently evaluated and admitted 2/2025 for concerns of similar complaints shortness of breath and found to have acute on chronic CHF exacerbation.  Patient was being evaluated and he is TTE was suggestive of severe systolic dysfunction with EF of 20 to 25%.  Before his evaluation could be completed with TORI as recommend by cardiology patient left AMA.  Also previously was hospitalized in [facility] where he left AMA after the heart cath.     Today in the ED patient was  x-ray stable bilateral airspace opacities  RPP positive coronavirus HKU1  Disontinue antibiotics  Pulmonary toilet  Keep SpO2 more than 92%  Continue to monitor     Hypertension:  Elevated blood pressure with systolics 150/110  Start the patient on lisinopril 10 daily  Labetalol as needed for SBP more than 160     Bipolar disorder:  Currently no acute complaints  Patient is AOx4  Resume the home meds with Depakote, Seroquel, Zoloft     BPH:  No acute complaints  Continue home dose Flomax     DVT ppx:heparin  GI ppx: protonix  Diet: ADULT DIET; Regular; Low Fat/Low Chol/High Fiber/DEYSI; Low Sodium (2 gm); 1500 ml  ADULT ORAL NUTRITION SUPPLEMENT; Breakfast, Lunch, Dinner, HS Snack; Standard 4 oz Oral Supplement    PT/OT: On board  Discharge Planning / SW: STEFAN Llamas MD  Internal Medicine Resident, PGY-1  [place], [place]  3/1/2025,9:54 AM

## 2025-03-01 NOTE — PROGRESS NOTES
Patient is uncooperative, unable to obtain nursing admission history at this time. States: \"I have health issues, I'm not answering questions\".

## 2025-03-01 NOTE — DISCHARGE INSTRUCTIONS
You presented to the hospital due to shortness of breath.   You were found to have coronavirus.     You were also noted to have aortic regurgitation and elevated troponin. TORI to be performed outpatient.   Please follow up with cardiologist.   You were started on new medications. Please take them as prescribed. These medications are Aldactone(Spirnolactone), Metoprolol Succinate(Toprol XL) and Lisinopril.     You will also be given a prescription of Lasix(Furosemide). Please take as prescribed.     If you begin to experience any symptoms such as chest pain shortness of breath nausea vomiting dizziness drowsiness abdominal pain loss of consciousness or any other symptoms you find concerning please come to the ED for follow-up evaluation.    If you have been given medication please take them as prescribed. Do not take more medication than recommended at any given time.     Please follow-up with your primary care provider within 5 to 7 days for continued care.     Please feel free return to the hospital if your symptoms worsen or any new concerning symptoms develop.  Follow-up with your primary care physician as needed for all other the concerns.

## 2025-03-01 NOTE — CARE COORDINATION
03/01/25 0815   Readmission Assessment   Number of Days since last admission? 1-7 days   Previous Disposition Home Alone   Who is being Interviewed Patient   What was the patient's/caregiver's perception as to why they think they needed to return back to the hospital? AMA discharge on prior admission   Did you visit your Primary Care Physician after you left the hospital, before you returned this time? No   Why weren't you able to visit your PCP? Did not have an appointment   Did you see a specialist, such as Cardiac, Pulmonary, Orthopedic Physician, etc. after you left the hospital? No   Who advised the patient to return to the hospital? Self-referral   Does the patient report anything that got in the way of taking their medications? No   In our efforts to provide the best possible care to you and others like you, can you think of anything that we could have done to help you after you left the hospital the first time, so that you might not have needed to return so soon? Other (Comment)  (nothing)

## 2025-03-11 ENCOUNTER — HOSPITAL ENCOUNTER (INPATIENT)
Age: 65
LOS: 1 days | Discharge: LEFT BEFORE DISCHARGE | DRG: 189 | End: 2025-03-12
Attending: EMERGENCY MEDICINE | Admitting: STUDENT IN AN ORGANIZED HEALTH CARE EDUCATION/TRAINING PROGRAM
Payer: MEDICARE

## 2025-03-11 ENCOUNTER — APPOINTMENT (OUTPATIENT)
Dept: GENERAL RADIOLOGY | Age: 65
DRG: 189 | End: 2025-03-11
Payer: MEDICARE

## 2025-03-11 DIAGNOSIS — U07.1 COVID: ICD-10-CM

## 2025-03-11 DIAGNOSIS — J18.9 PNEUMONIA DUE TO INFECTIOUS ORGANISM, UNSPECIFIED LATERALITY, UNSPECIFIED PART OF LUNG: Primary | ICD-10-CM

## 2025-03-11 DIAGNOSIS — J44.1 COPD EXACERBATION (HCC): ICD-10-CM

## 2025-03-11 DIAGNOSIS — I50.9 ACUTE ON CHRONIC CONGESTIVE HEART FAILURE, UNSPECIFIED HEART FAILURE TYPE (HCC): ICD-10-CM

## 2025-03-11 LAB
ANION GAP SERPL CALCULATED.3IONS-SCNC: 17 MMOL/L (ref 9–16)
BASOPHILS # BLD: 0.03 K/UL (ref 0–0.2)
BASOPHILS NFR BLD: 0 % (ref 0–2)
BNP SERPL-MCNC: ABNORMAL PG/ML (ref 0–125)
BUN SERPL-MCNC: 18 MG/DL (ref 8–23)
CALCIUM SERPL-MCNC: 9.8 MG/DL (ref 8.6–10.4)
CHLORIDE SERPL-SCNC: 103 MMOL/L (ref 98–107)
CO2 SERPL-SCNC: 21 MMOL/L (ref 20–31)
CREAT SERPL-MCNC: 1.1 MG/DL (ref 0.7–1.2)
EOSINOPHIL # BLD: <0.03 K/UL (ref 0–0.44)
EOSINOPHILS RELATIVE PERCENT: 0 % (ref 1–4)
ERYTHROCYTE [DISTWIDTH] IN BLOOD BY AUTOMATED COUNT: 13 % (ref 11.8–14.4)
FLUAV AG SPEC QL: NEGATIVE
FLUBV AG SPEC QL: NEGATIVE
GFR, ESTIMATED: 74 ML/MIN/1.73M2
GLUCOSE SERPL-MCNC: 119 MG/DL (ref 74–99)
HCT VFR BLD AUTO: 45.3 % (ref 40.7–50.3)
HGB BLD-MCNC: 15 G/DL (ref 13–17)
IMM GRANULOCYTES # BLD AUTO: <0.03 K/UL (ref 0–0.3)
IMM GRANULOCYTES NFR BLD: 0 %
LACTIC ACID, WHOLE BLOOD: 4.2 MMOL/L (ref 0.7–2.1)
LYMPHOCYTES NFR BLD: 1.58 K/UL (ref 1.1–3.7)
LYMPHOCYTES RELATIVE PERCENT: 18 % (ref 24–43)
MCH RBC QN AUTO: 32.6 PG (ref 25.2–33.5)
MCHC RBC AUTO-ENTMCNC: 33.1 G/DL (ref 28.4–34.8)
MCV RBC AUTO: 98.5 FL (ref 82.6–102.9)
MONOCYTES NFR BLD: 0.59 K/UL (ref 0.1–1.2)
MONOCYTES NFR BLD: 7 % (ref 3–12)
NEUTROPHILS NFR BLD: 75 % (ref 36–65)
NEUTS SEG NFR BLD: 6.7 K/UL (ref 1.5–8.1)
NRBC BLD-RTO: 0 PER 100 WBC
PLATELET # BLD AUTO: 347 K/UL (ref 138–453)
PMV BLD AUTO: 10.3 FL (ref 8.1–13.5)
POTASSIUM SERPL-SCNC: 4.5 MMOL/L (ref 3.7–5.3)
RBC # BLD AUTO: 4.6 M/UL (ref 4.21–5.77)
SARS-COV-2 RDRP RESP QL NAA+PROBE: ABNORMAL
SODIUM SERPL-SCNC: 141 MMOL/L (ref 136–145)
SPECIMEN DESCRIPTION: ABNORMAL
TROPONIN I SERPL HS-MCNC: 39 NG/L (ref 0–22)
TROPONIN I SERPL HS-MCNC: 42 NG/L (ref 0–22)
WBC OTHER # BLD: 8.9 K/UL (ref 3.5–11.3)

## 2025-03-11 PROCEDURE — 6360000002 HC RX W HCPCS

## 2025-03-11 PROCEDURE — 96375 TX/PRO/DX INJ NEW DRUG ADDON: CPT

## 2025-03-11 PROCEDURE — 83605 ASSAY OF LACTIC ACID: CPT

## 2025-03-11 PROCEDURE — 2700000000 HC OXYGEN THERAPY PER DAY

## 2025-03-11 PROCEDURE — 85025 COMPLETE CBC W/AUTO DIFF WBC: CPT

## 2025-03-11 PROCEDURE — 71045 X-RAY EXAM CHEST 1 VIEW: CPT

## 2025-03-11 PROCEDURE — 87804 INFLUENZA ASSAY W/OPTIC: CPT

## 2025-03-11 PROCEDURE — 2500000003 HC RX 250 WO HCPCS

## 2025-03-11 PROCEDURE — 83880 ASSAY OF NATRIURETIC PEPTIDE: CPT

## 2025-03-11 PROCEDURE — 94660 CPAP INITIATION&MGMT: CPT

## 2025-03-11 PROCEDURE — 2580000003 HC RX 258

## 2025-03-11 PROCEDURE — 87205 SMEAR GRAM STAIN: CPT

## 2025-03-11 PROCEDURE — 99285 EMERGENCY DEPT VISIT HI MDM: CPT

## 2025-03-11 PROCEDURE — 96365 THER/PROPH/DIAG IV INF INIT: CPT

## 2025-03-11 PROCEDURE — 87040 BLOOD CULTURE FOR BACTERIA: CPT

## 2025-03-11 PROCEDURE — 93005 ELECTROCARDIOGRAM TRACING: CPT

## 2025-03-11 PROCEDURE — 80048 BASIC METABOLIC PNL TOTAL CA: CPT

## 2025-03-11 PROCEDURE — 94761 N-INVAS EAR/PLS OXIMETRY MLT: CPT

## 2025-03-11 PROCEDURE — 87154 CUL TYP ID BLD PTHGN 6+ TRGT: CPT

## 2025-03-11 PROCEDURE — 87635 SARS-COV-2 COVID-19 AMP PRB: CPT

## 2025-03-11 PROCEDURE — 84484 ASSAY OF TROPONIN QUANT: CPT

## 2025-03-11 RX ORDER — 0.9 % SODIUM CHLORIDE 0.9 %
500 INTRAVENOUS SOLUTION INTRAVENOUS ONCE
Status: DISCONTINUED | OUTPATIENT
Start: 2025-03-12 | End: 2025-03-12

## 2025-03-11 RX ORDER — MAGNESIUM SULFATE IN WATER 40 MG/ML
2000 INJECTION, SOLUTION INTRAVENOUS ONCE
Status: COMPLETED | OUTPATIENT
Start: 2025-03-11 | End: 2025-03-11

## 2025-03-11 RX ORDER — FUROSEMIDE 10 MG/ML
20 INJECTION INTRAMUSCULAR; INTRAVENOUS ONCE
Status: COMPLETED | OUTPATIENT
Start: 2025-03-11 | End: 2025-03-11

## 2025-03-11 RX ORDER — DEXAMETHASONE SODIUM PHOSPHATE 10 MG/ML
10 INJECTION, SOLUTION INTRAMUSCULAR; INTRAVENOUS ONCE
Status: COMPLETED | OUTPATIENT
Start: 2025-03-11 | End: 2025-03-11

## 2025-03-11 RX ADMIN — AZITHROMYCIN MONOHYDRATE 500 MG: 500 INJECTION, POWDER, LYOPHILIZED, FOR SOLUTION INTRAVENOUS at 23:16

## 2025-03-11 RX ADMIN — DEXAMETHASONE SODIUM PHOSPHATE 10 MG: 10 INJECTION, SOLUTION INTRAMUSCULAR; INTRAVENOUS at 22:45

## 2025-03-11 RX ADMIN — FUROSEMIDE 20 MG: 10 INJECTION, SOLUTION INTRAMUSCULAR; INTRAVENOUS at 23:16

## 2025-03-11 RX ADMIN — WATER 1000 MG: 1 INJECTION INTRAMUSCULAR; INTRAVENOUS; SUBCUTANEOUS at 23:14

## 2025-03-11 RX ADMIN — MAGNESIUM SULFATE HEPTAHYDRATE 2000 MG: 40 INJECTION, SOLUTION INTRAVENOUS at 22:47

## 2025-03-11 ASSESSMENT — LIFESTYLE VARIABLES
HOW OFTEN DO YOU HAVE A DRINK CONTAINING ALCOHOL: 2-4 TIMES A MONTH
HOW MANY STANDARD DRINKS CONTAINING ALCOHOL DO YOU HAVE ON A TYPICAL DAY: 1 OR 2

## 2025-03-11 ASSESSMENT — PAIN SCALES - GENERAL: PAINLEVEL_OUTOF10: 7

## 2025-03-11 ASSESSMENT — PAIN - FUNCTIONAL ASSESSMENT: PAIN_FUNCTIONAL_ASSESSMENT: 0-10

## 2025-03-12 ENCOUNTER — APPOINTMENT (OUTPATIENT)
Dept: CT IMAGING | Age: 65
DRG: 189 | End: 2025-03-12
Payer: MEDICARE

## 2025-03-12 VITALS
RESPIRATION RATE: 16 BRPM | OXYGEN SATURATION: 97 % | HEIGHT: 67 IN | DIASTOLIC BLOOD PRESSURE: 66 MMHG | TEMPERATURE: 98.1 F | HEART RATE: 95 BPM | BODY MASS INDEX: 22.66 KG/M2 | WEIGHT: 144.4 LBS | SYSTOLIC BLOOD PRESSURE: 114 MMHG

## 2025-03-12 PROBLEM — B34.2 CORONAVIRUS INFECTION: Status: ACTIVE | Noted: 2025-03-12

## 2025-03-12 PROBLEM — J44.1 COPD EXACERBATION (HCC): Status: ACTIVE | Noted: 2025-03-12

## 2025-03-12 PROBLEM — J96.01 ACUTE HYPOXIC RESPIRATORY FAILURE (HCC): Status: ACTIVE | Noted: 2025-03-12

## 2025-03-12 PROBLEM — N39.0 COMPLICATED UTI (URINARY TRACT INFECTION): Status: RESOLVED | Noted: 2017-06-25 | Resolved: 2025-03-12

## 2025-03-12 PROBLEM — R29.898 RIGHT HAND WEAKNESS: Status: RESOLVED | Noted: 2017-06-25 | Resolved: 2025-03-12

## 2025-03-12 LAB
ALLEN TEST: POSITIVE
ANTI-XA UNFRAC HEPARIN: 0.4 IU/L
ANTI-XA UNFRAC HEPARIN: 0.73 IU/L
B PARAP IS1001 DNA NPH QL NAA+NON-PROBE: NOT DETECTED
B PERT DNA SPEC QL NAA+PROBE: NOT DETECTED
C PNEUM DNA NPH QL NAA+NON-PROBE: NOT DETECTED
CRP SERPL HS-MCNC: <3 MG/L (ref 0–5)
EKG ATRIAL RATE: 106 BPM
EKG P AXIS: 72 DEGREES
EKG P-R INTERVAL: 166 MS
EKG Q-T INTERVAL: 374 MS
EKG QRS DURATION: 86 MS
EKG QTC CALCULATION (BAZETT): 496 MS
EKG R AXIS: -73 DEGREES
EKG T AXIS: 116 DEGREES
EKG VENTRICULAR RATE: 106 BPM
FIO2: 10
FLUAV RNA NPH QL NAA+NON-PROBE: NOT DETECTED
FLUBV RNA NPH QL NAA+NON-PROBE: NOT DETECTED
HADV DNA NPH QL NAA+NON-PROBE: NOT DETECTED
HCOV 229E RNA NPH QL NAA+NON-PROBE: NOT DETECTED
HCOV HKU1 RNA NPH QL NAA+NON-PROBE: NOT DETECTED
HCOV NL63 RNA NPH QL NAA+NON-PROBE: NOT DETECTED
HCOV OC43 RNA NPH QL NAA+NON-PROBE: NOT DETECTED
HCT VFR BLD AUTO: 47 % (ref 41–53)
HMPV RNA NPH QL NAA+NON-PROBE: NOT DETECTED
HPIV1 RNA NPH QL NAA+NON-PROBE: NOT DETECTED
HPIV2 RNA NPH QL NAA+NON-PROBE: NOT DETECTED
HPIV3 RNA NPH QL NAA+NON-PROBE: NOT DETECTED
HPIV4 RNA NPH QL NAA+NON-PROBE: NOT DETECTED
INR PPP: 1.3
L PNEUMO1 AG UR QL IA.RAPID: NEGATIVE
LACTIC ACID, WHOLE BLOOD: 2.4 MMOL/L (ref 0.7–2.1)
M PNEUMO DNA NPH QL NAA+NON-PROBE: NOT DETECTED
NEGATIVE BASE EXCESS, ART: 1.3 MMOL/L (ref 0–2)
O2 DELIVERY DEVICE: NORMAL
PARTIAL THROMBOPLASTIN TIME: 30.5 SEC (ref 23–36.5)
PARTIAL THROMBOPLASTIN TIME: 89.3 SEC (ref 23–36.5)
POC HCO3: 22.7 MMOL/L (ref 21–28)
POC HEMOGLOBIN (CALC): 15.9 G/DL (ref 13.5–17.5)
POC O2 SATURATION: 97.9 % (ref 94–98)
POC PCO2: 35.7 MM HG (ref 35–48)
POC PH: 7.41 (ref 7.35–7.45)
POC PO2: 100.5 MM HG (ref 83–108)
PROTHROMBIN TIME: 16.3 SEC (ref 11.7–14.9)
RSV RNA NPH QL NAA+NON-PROBE: NOT DETECTED
RV+EV RNA NPH QL NAA+NON-PROBE: NOT DETECTED
S PNEUM AG SPEC QL: NEGATIVE
SAMPLE SITE: NORMAL
SARS-COV-2 RNA NPH QL NAA+NON-PROBE: NOT DETECTED
SPECIMEN DESCRIPTION: NORMAL
SPECIMEN SOURCE: NORMAL

## 2025-03-12 PROCEDURE — 2580000003 HC RX 258

## 2025-03-12 PROCEDURE — 36600 WITHDRAWAL OF ARTERIAL BLOOD: CPT

## 2025-03-12 PROCEDURE — 85730 THROMBOPLASTIN TIME PARTIAL: CPT

## 2025-03-12 PROCEDURE — 85520 HEPARIN ASSAY: CPT

## 2025-03-12 PROCEDURE — 2500000003 HC RX 250 WO HCPCS

## 2025-03-12 PROCEDURE — 36415 COLL VENOUS BLD VENIPUNCTURE: CPT

## 2025-03-12 PROCEDURE — 87324 CLOSTRIDIUM AG IA: CPT

## 2025-03-12 PROCEDURE — 87506 IADNA-DNA/RNA PROBE TQ 6-11: CPT

## 2025-03-12 PROCEDURE — 99223 1ST HOSP IP/OBS HIGH 75: CPT | Performed by: STUDENT IN AN ORGANIZED HEALTH CARE EDUCATION/TRAINING PROGRAM

## 2025-03-12 PROCEDURE — 2700000000 HC OXYGEN THERAPY PER DAY

## 2025-03-12 PROCEDURE — 94761 N-INVAS EAR/PLS OXIMETRY MLT: CPT

## 2025-03-12 PROCEDURE — 0202U NFCT DS 22 TRGT SARS-COV-2: CPT

## 2025-03-12 PROCEDURE — APPSS60 APP SPLIT SHARED TIME 46-60 MINUTES

## 2025-03-12 PROCEDURE — 6360000002 HC RX W HCPCS

## 2025-03-12 PROCEDURE — 2060000000 HC ICU INTERMEDIATE R&B

## 2025-03-12 PROCEDURE — 6370000000 HC RX 637 (ALT 250 FOR IP)

## 2025-03-12 PROCEDURE — 87641 MR-STAPH DNA AMP PROBE: CPT

## 2025-03-12 PROCEDURE — 86738 MYCOPLASMA ANTIBODY: CPT

## 2025-03-12 PROCEDURE — G0545 PR INHERENT VISIT TO INPT: HCPCS | Performed by: INTERNAL MEDICINE

## 2025-03-12 PROCEDURE — 87449 NOS EACH ORGANISM AG IA: CPT

## 2025-03-12 PROCEDURE — 85014 HEMATOCRIT: CPT

## 2025-03-12 PROCEDURE — 87899 AGENT NOS ASSAY W/OPTIC: CPT

## 2025-03-12 PROCEDURE — 99222 1ST HOSP IP/OBS MODERATE 55: CPT | Performed by: INTERNAL MEDICINE

## 2025-03-12 PROCEDURE — 96375 TX/PRO/DX INJ NEW DRUG ADDON: CPT

## 2025-03-12 PROCEDURE — 85610 PROTHROMBIN TIME: CPT

## 2025-03-12 PROCEDURE — 82803 BLOOD GASES ANY COMBINATION: CPT

## 2025-03-12 PROCEDURE — 83605 ASSAY OF LACTIC ACID: CPT

## 2025-03-12 PROCEDURE — 71250 CT THORAX DX C-: CPT

## 2025-03-12 PROCEDURE — 86140 C-REACTIVE PROTEIN: CPT

## 2025-03-12 RX ORDER — HEPARIN SODIUM 1000 [USP'U]/ML
40 INJECTION, SOLUTION INTRAVENOUS; SUBCUTANEOUS PRN
Status: DISCONTINUED | OUTPATIENT
Start: 2025-03-12 | End: 2025-03-12 | Stop reason: HOSPADM

## 2025-03-12 RX ORDER — ACETAMINOPHEN 325 MG/1
650 TABLET ORAL EVERY 6 HOURS PRN
Status: DISCONTINUED | OUTPATIENT
Start: 2025-03-12 | End: 2025-03-12 | Stop reason: HOSPADM

## 2025-03-12 RX ORDER — SODIUM CHLORIDE 0.9 % (FLUSH) 0.9 %
5-40 SYRINGE (ML) INJECTION PRN
Status: DISCONTINUED | OUTPATIENT
Start: 2025-03-12 | End: 2025-03-12 | Stop reason: HOSPADM

## 2025-03-12 RX ORDER — TAMSULOSIN HYDROCHLORIDE 0.4 MG/1
0.4 CAPSULE ORAL DAILY
Status: DISCONTINUED | OUTPATIENT
Start: 2025-03-12 | End: 2025-03-12 | Stop reason: HOSPADM

## 2025-03-12 RX ORDER — FUROSEMIDE 10 MG/ML
60 INJECTION INTRAMUSCULAR; INTRAVENOUS 2 TIMES DAILY
Status: DISCONTINUED | OUTPATIENT
Start: 2025-03-12 | End: 2025-03-12 | Stop reason: HOSPADM

## 2025-03-12 RX ORDER — POTASSIUM CHLORIDE 1500 MG/1
40 TABLET, EXTENDED RELEASE ORAL PRN
Status: DISCONTINUED | OUTPATIENT
Start: 2025-03-12 | End: 2025-03-12 | Stop reason: HOSPADM

## 2025-03-12 RX ORDER — ONDANSETRON 2 MG/ML
4 INJECTION INTRAMUSCULAR; INTRAVENOUS EVERY 6 HOURS PRN
Status: DISCONTINUED | OUTPATIENT
Start: 2025-03-12 | End: 2025-03-12 | Stop reason: HOSPADM

## 2025-03-12 RX ORDER — HEPARIN SODIUM 1000 [USP'U]/ML
80 INJECTION, SOLUTION INTRAVENOUS; SUBCUTANEOUS PRN
Status: DISCONTINUED | OUTPATIENT
Start: 2025-03-12 | End: 2025-03-12 | Stop reason: HOSPADM

## 2025-03-12 RX ORDER — SPIRONOLACTONE 25 MG/1
12.5 TABLET ORAL DAILY
Status: DISCONTINUED | OUTPATIENT
Start: 2025-03-12 | End: 2025-03-12 | Stop reason: HOSPADM

## 2025-03-12 RX ORDER — POLYETHYLENE GLYCOL 3350 17 G/17G
17 POWDER, FOR SOLUTION ORAL DAILY PRN
Status: DISCONTINUED | OUTPATIENT
Start: 2025-03-12 | End: 2025-03-12 | Stop reason: HOSPADM

## 2025-03-12 RX ORDER — HEPARIN SODIUM 1000 [USP'U]/ML
40 INJECTION, SOLUTION INTRAVENOUS; SUBCUTANEOUS PRN
Status: DISCONTINUED | OUTPATIENT
Start: 2025-03-12 | End: 2025-03-12

## 2025-03-12 RX ORDER — FUROSEMIDE 10 MG/ML
40 INJECTION INTRAMUSCULAR; INTRAVENOUS 2 TIMES DAILY
Status: DISCONTINUED | OUTPATIENT
Start: 2025-03-12 | End: 2025-03-12

## 2025-03-12 RX ORDER — HEPARIN SODIUM 1000 [USP'U]/ML
80 INJECTION, SOLUTION INTRAVENOUS; SUBCUTANEOUS PRN
Status: DISCONTINUED | OUTPATIENT
Start: 2025-03-12 | End: 2025-03-12

## 2025-03-12 RX ORDER — HEPARIN SODIUM 1000 [USP'U]/ML
80 INJECTION, SOLUTION INTRAVENOUS; SUBCUTANEOUS ONCE
Status: DISCONTINUED | OUTPATIENT
Start: 2025-03-12 | End: 2025-03-12

## 2025-03-12 RX ORDER — MAGNESIUM SULFATE IN WATER 40 MG/ML
2000 INJECTION, SOLUTION INTRAVENOUS PRN
Status: DISCONTINUED | OUTPATIENT
Start: 2025-03-12 | End: 2025-03-12 | Stop reason: HOSPADM

## 2025-03-12 RX ORDER — HEPARIN SODIUM 10000 [USP'U]/100ML
5-30 INJECTION, SOLUTION INTRAVENOUS CONTINUOUS
Status: DISCONTINUED | OUTPATIENT
Start: 2025-03-12 | End: 2025-03-12 | Stop reason: HOSPADM

## 2025-03-12 RX ORDER — QUETIAPINE FUMARATE 50 MG/1
50 TABLET, EXTENDED RELEASE ORAL NIGHTLY
Status: DISCONTINUED | OUTPATIENT
Start: 2025-03-13 | End: 2025-03-12 | Stop reason: HOSPADM

## 2025-03-12 RX ORDER — HEPARIN SODIUM 1000 [USP'U]/ML
80 INJECTION, SOLUTION INTRAVENOUS; SUBCUTANEOUS ONCE
Status: COMPLETED | OUTPATIENT
Start: 2025-03-12 | End: 2025-03-12

## 2025-03-12 RX ORDER — POTASSIUM CHLORIDE 7.45 MG/ML
10 INJECTION INTRAVENOUS PRN
Status: DISCONTINUED | OUTPATIENT
Start: 2025-03-12 | End: 2025-03-12 | Stop reason: HOSPADM

## 2025-03-12 RX ORDER — ONDANSETRON 2 MG/ML
4 INJECTION INTRAMUSCULAR; INTRAVENOUS ONCE
Status: COMPLETED | OUTPATIENT
Start: 2025-03-12 | End: 2025-03-12

## 2025-03-12 RX ORDER — ALBUTEROL SULFATE 0.83 MG/ML
2.5 SOLUTION RESPIRATORY (INHALATION) EVERY 6 HOURS PRN
Status: DISCONTINUED | OUTPATIENT
Start: 2025-03-12 | End: 2025-03-12 | Stop reason: HOSPADM

## 2025-03-12 RX ORDER — ONDANSETRON 4 MG/1
4 TABLET, ORALLY DISINTEGRATING ORAL EVERY 8 HOURS PRN
Status: DISCONTINUED | OUTPATIENT
Start: 2025-03-12 | End: 2025-03-12 | Stop reason: HOSPADM

## 2025-03-12 RX ORDER — ACETAMINOPHEN 650 MG/1
650 SUPPOSITORY RECTAL EVERY 6 HOURS PRN
Status: DISCONTINUED | OUTPATIENT
Start: 2025-03-12 | End: 2025-03-12 | Stop reason: HOSPADM

## 2025-03-12 RX ORDER — SODIUM CHLORIDE 0.9 % (FLUSH) 0.9 %
5-40 SYRINGE (ML) INJECTION EVERY 12 HOURS SCHEDULED
Status: DISCONTINUED | OUTPATIENT
Start: 2025-03-12 | End: 2025-03-12 | Stop reason: HOSPADM

## 2025-03-12 RX ORDER — HEPARIN SODIUM 10000 [USP'U]/100ML
5-30 INJECTION, SOLUTION INTRAVENOUS CONTINUOUS
Status: DISCONTINUED | OUTPATIENT
Start: 2025-03-12 | End: 2025-03-12

## 2025-03-12 RX ORDER — AZITHROMYCIN 250 MG/1
250 TABLET, FILM COATED ORAL DAILY
Status: DISCONTINUED | OUTPATIENT
Start: 2025-03-12 | End: 2025-03-12 | Stop reason: HOSPADM

## 2025-03-12 RX ORDER — IPRATROPIUM BROMIDE AND ALBUTEROL SULFATE 2.5; .5 MG/3ML; MG/3ML
1 SOLUTION RESPIRATORY (INHALATION) EVERY 6 HOURS
Status: DISCONTINUED | OUTPATIENT
Start: 2025-03-12 | End: 2025-03-12 | Stop reason: HOSPADM

## 2025-03-12 RX ORDER — QUETIAPINE FUMARATE 50 MG/1
150 TABLET, EXTENDED RELEASE ORAL
Status: DISCONTINUED | OUTPATIENT
Start: 2025-03-12 | End: 2025-03-12

## 2025-03-12 RX ORDER — LACTOBACILLUS RHAMNOSUS GG 10B CELL
1 CAPSULE ORAL
Status: DISCONTINUED | OUTPATIENT
Start: 2025-03-13 | End: 2025-03-12 | Stop reason: HOSPADM

## 2025-03-12 RX ORDER — SODIUM CHLORIDE 9 MG/ML
INJECTION, SOLUTION INTRAVENOUS PRN
Status: DISCONTINUED | OUTPATIENT
Start: 2025-03-12 | End: 2025-03-12 | Stop reason: HOSPADM

## 2025-03-12 RX ADMIN — TAMSULOSIN HYDROCHLORIDE 0.4 MG: 0.4 CAPSULE ORAL at 10:53

## 2025-03-12 RX ADMIN — FUROSEMIDE 60 MG: 10 INJECTION, SOLUTION INTRAMUSCULAR; INTRAVENOUS at 10:53

## 2025-03-12 RX ADMIN — SPIRONOLACTONE 12.5 MG: 25 TABLET, FILM COATED ORAL at 10:53

## 2025-03-12 RX ADMIN — QUETIAPINE FUMARATE 150 MG: 50 TABLET, EXTENDED RELEASE ORAL at 11:41

## 2025-03-12 RX ADMIN — HEPARIN SODIUM 18 UNITS/KG/HR: 10000 INJECTION, SOLUTION INTRAVENOUS at 01:09

## 2025-03-12 RX ADMIN — CEFEPIME 2000 MG: 2 INJECTION, POWDER, FOR SOLUTION INTRAVENOUS at 11:07

## 2025-03-12 RX ADMIN — SODIUM CHLORIDE 500 ML: 0.9 INJECTION, SOLUTION INTRAVENOUS at 00:04

## 2025-03-12 RX ADMIN — HEPARIN SODIUM 18 UNITS/KG/HR: 10000 INJECTION, SOLUTION INTRAVENOUS at 12:56

## 2025-03-12 RX ADMIN — SODIUM CHLORIDE, PRESERVATIVE FREE 10 ML: 5 INJECTION INTRAVENOUS at 10:54

## 2025-03-12 RX ADMIN — ONDANSETRON 4 MG: 2 INJECTION INTRAMUSCULAR; INTRAVENOUS at 00:24

## 2025-03-12 RX ADMIN — HEPARIN SODIUM 5200 UNITS: 1000 INJECTION INTRAVENOUS; SUBCUTANEOUS at 01:07

## 2025-03-12 NOTE — PROGRESS NOTES
Writer informed by primary MD that pt is stating he will be signing himself out AMA to go to [facility]. Primary MD explained to pt the risks of going AMA and that [facility] does not have an open bed for pt and that he will have to wait at [facility] until a bed is available for proper transfer. Writer explained that if pt chooses to leave prior to having an open bed at [facility] that he will be signing himself out AMA. Pt does not believe that he is signing himself out AMA just that he is transferring himself to [facility]. AMA form signed and both PIVs removed. Pt left unit.

## 2025-03-12 NOTE — CONSULTS
ATTESTATION:    I have discussed the case, including pertinent history and exam findings with the APRN. I have evaluated the  History, physical findings and pictures of the patient and the key elements of the encounter have been performed by me. I have reviewed the laboratory data, other diagnostic studies and discussed them with the APRN. I have updated the medical record where necessary.    I agree with the assessment, plan and orders as documented by the APRN.    In addition diagnostic and decision making elements, performed by the Attending Physician, are included in the Diagnostic and Decision Making Section of the text.    Elements of Medical Decision Making:  Note: I have independently performed the steps listed below as part of the medical decision making and evaluation.   Examined and discussed with patient.  Community-acquired pneumonia versus fluid overload  Coronavirus H KU 1 infection on 2/28/2025  Negative test for COVID  Diarrhea  Lactic acidosis  Elevated proBNP  Chronic systolic congestive heart failure with decreased left ventricular ejection fraction of 15 to 20%  COPD.  On home oxygen  Pulmonary fibrosis  Severe aortic regurgitation  Prior pulmonary embolus.  On Eliquis  Essential hypertension  Bipolar disease  Schizophrenia  Allergy to penicillin  History of noncompliance  Labs, medications, radiologic studies were reviewed with personal review of films  Radiologic studies  Lab work  Cultures  Large amounts of data were reviewed  Please see history of present illness  Discussed with nursing Staff, Discharge planner  Dr Patel  Infection Control and Prevention measures reviewed  Universal precaution  All prior entries were reviewed  Internal medicine notes reviewed  Administer medications as ordered  On cefepime  Prognosis:   Guarded  Discharge planning reviewed  Follow up as outpatient.    Dante Navarro MD.      Dante Navarro MD     3/12/2025            [facility]  recommended for follow-up and upon resolution of edema pattern  3. Cardiomegaly with trace pericardial effusion.  4. Aneurysmal 4.2 cm supravalvular ascending thoracic aorta.  5. Coronary calcifications in the LAD.    CXR 3/12/25:  Stable examination with perihilar opacities that may represent pulmonary  fibrosis. Superimposed pneumonia or pulmonary edema are also in the  differential.  Given persistence of findings, chest CT should be considered  for further evaluation.  Cardiomegaly  Small left pleural effusion.    CURRENT EVALUATION- DAILY INTERVAL CHANGES 3/12/2025  /86   Pulse (!) 111   Temp 98.4 °F (36.9 °C) (Axillary)   Resp 28   Ht 1.7 m (5' 6.93\")   Wt 65.5 kg (144 lb 6.4 oz)   SpO2 100%   BMI 22.66 kg/m²     Afebrile  Vital signs stable  Heparin gtt started for prior PE    The patient is alert and oriented on [place] hi-flow NC at 10 L.  He is agitated and not willing to participate in examination.   He wants to be transferred to [facility].     Cefepime continues for concern of pneumonia.     Strep pneumo, legionella, Mycoplasma and C-diff are pending.     Discussed with RN    Labs, X rays reviewed: 3/12/2025 with independent review of X rays    I have independently reviewed/ordered the following labs:    CBC with Differential:   Recent Labs     03/11/25  2211   WBC 8.9   HGB 15.0   HCT 45.3      LYMPHOPCT 18*   MONOPCT 7   EOSPCT 0*     BMP:   Recent Labs     03/11/25  2211      K 4.5      CO2 21   BUN 18   CREATININE 1.1     Hepatic Function Panel: No results for input(s): \"LABALBU\", \"BILIDIR\", \"IBILI\", \"BILITOT\", \"ALKPHOS\", \"ALT\", \"AST\" in the last 72 hours.    Invalid input(s): \"PROT\"  No results for input(s): \"RPR\" in the last 72 hours.  No results for input(s): \"HIV\" in the last 72 hours.  No results for input(s): \"BC\" in the last 72 hours.  Lab Results   Component Value Date/Time    BACTERIA NOT REPORTED 05/27/2021 08:00 PM    MUCUS NOT REPORTED 05/27/2021 08:00  sounds normal. No organomegaly. Loose stool  All four Extremities: No cyanosis, clubbing, edema, or effusions.  Neurologic: No gross sensory or motor deficits.  Skin: Warm and dry with good turgor.No signs of peripheral arterial or venous insufficiency. No ulcerations. No open wounds.      I have personally reviewed the past medical history, past surgical history, medications, social history, and family history, and I have updated the database accordingly.    Past Medical History:     Past Medical History:   Diagnosis Date    Chronic kidney disease     kidney stones    Degenerative disk disease     Cervical    Depression     Hematuria     Paranoia (HCC)     Schizophrenia ([facility])        Past Surgical  History:     Past Surgical History:   Procedure Laterality Date    CYSTOSCOPY  12/18/12    Rt steinstrasse    HERNIA REPAIR  3/2010    LITHOTRIPSY  12/18/12    Rt holmium laser     URETEROSCOPY      Rt       Medications:      sodium chloride flush  5-40 mL IntraVENous 2 times per day    spironolactone  12.5 mg Oral Daily    furosemide  60 mg IntraVENous BID    azithromycin  250 mg Oral Daily    tamsulosin  0.4 mg Oral Daily    QUEtiapine  150 mg Oral Daily RT    cefepime  2,000 mg IntraVENous Q12H    [START ON 3/13/2025] lactobacillus  1 capsule Oral Daily with breakfast       Social History:     Social History     Socioeconomic History    Marital status:      Spouse name: Not on file    Number of children: Not on file    Years of education: Not on file    Highest education level: Not on file   Occupational History    Not on file   Tobacco Use    Smoking status: Every Day     Current packs/day: 1.00     Types: Cigarettes    Smokeless tobacco: Never    Tobacco comments:     2-3 cigarettes a day   Substance and Sexual Activity    Alcohol use: Yes     Alcohol/week: 21.0 standard drinks of alcohol     Types: 21 Cans of beer per week    Drug use: No    Sexual activity: Not on file   Other Topics Concern    Not on file  Not Detected     Parainfluenza 1 PCR Not Detected     Parainfluenza 2 PCR Not Detected     Parainfluenza 3 PCR Not Detected     Parainfluenza 4 PCR Not Detected     Resp Syncytial Virus PCR Not Detected     Bordetella parapertussis by PCR Not Detected     B Pertussis by PCR Not Detected     Chlamydia pneumoniae By PCR Not Detected     Mycoplasma pneumo by PCR Not Detected     Comment: Performed by multiplexed nucleic acid assay.       Culture, Respiratory [[id number]]     Order Status: No result Specimen: Sputum Expectorated     LEGIONELLA ANTIGEN, URINE [[id number]]     Order Status: No result Specimen: Urine     Culture, Blood 1 [[id number]] Collected: 02/28/25 0016    Order Status: Completed Specimen: Blood Updated: 03/05/25 0123     Specimen Description .BLOOD     Special Requests L FA 2ML     Culture NO GROWTH 5 DAYS    Culture, Blood 1 [[id number]] Collected: 02/28/25 0011    Order Status: Completed Specimen: Blood Updated: 03/05/25 0123     Specimen Description .BLOOD     Special Requests R FA 8ML     Culture NO GROWTH 5 DAYS              Medical Decision Making-Other:     Note:      Thank you for allowing us to participate in the care of this patient. Please call with questions.    REJI Lindsay - [facility]  Pager: [phone] - Office: [phone]

## 2025-03-12 NOTE — ED PROVIDER NOTES
[facility]     Emergency Department     Faculty Attestation    I performed a history and physical examination of the patient and discussed management with the resident. I reviewed the resident's note and agree with the documented findings and plan of care. Any areas of disagreement are noted on the chart. I was personally present for the key portions of any procedures. I have documented in the chart those procedures where I was not present during the key portions. I have reviewed the emergency nurses triage note. I agree with the chief complaint, past medical history, past surgical history, allergies, medications, social and family history as documented unless otherwise noted below. For Physician Assistant/ Nurse Practitioner cases/documentation I have personally evaluated this patient and have completed at least one if not all key elements of the E/M (history, physical exam, and MDM). Additional findings are as noted.    Worsening congestive heart failure, bilateral pretibial pitting, few rales at the bases.  Hypoxic on arrival.       EKG Interpretation    Interpreted by emergency department physician    Rhythm: normal sinus   Rate: 106  Axis: Left -73 degrees  Ectopy: PVC  Conduction: Left anterior hemiblock, LVH, QT corrected 496 ms  ST Segments: no acute change  T Waves: Lateral inversion  Q Waves: Septal with poor R wave progression    Clinical Impression: Abnormal EKG    Diego Peterson, III           Diego Peterson MD  03/11/25 1315

## 2025-03-12 NOTE — H&P
[facility]     Department of Internal Medicine - Staff Internal Medicine Teaching Service          ADMISSION NOTE/HISTORY AND PHYSICAL EXAMINATION   Date: 3/12/2025  Patient Name: James Gabriel  Date of admission: 3/11/2025 10:04 PM  YOB: 1960  PCP: Ishan Feliz MD  History Obtained From:  patient, electronic medical record    CHIEF COMPLAINT     Chief complaint: Rib Pain .shortness of breath      HISTORY OF PRESENTING ILLNESS     Briefly patient is a 64-year-old male with past medical history of nonischemic cardiomyopathy with EF of 15 to 20%, severe aortic regurgitation, moderate tricuspid regurgitation, chronic HFrEF, bipolar disorder, BPH.  Patient initially presented to the hospital with chief complaints of right-sided rib pain, chest pain and shortness of breath.  Reportedly patient has been recently diagnosed with pneumonia and treated for the same and he finished antibiotic therapy for that.  He was evaluated in the emergency department, underwent chest x-ray concerning for pneumonia versus pulmonary edema.  He was initially placed on BiPAP for respiratory support on presentation to the emergency department.  He was given breathing treatments, underwent workup which showed elevated lactate at 4, sepsis workup was initiated he was started on antibiotics and IV fluids were deferred due to volume overload.  He was found to be indeterminate for COVID-19.  Patient was persistently requiring BiPAP and will be admitted to inpatient due to acute respiratory failure secondary to CHF versus COPD exacerbation and underlying pneumonia.    Patient had recent admission where patient was admitted to the hospital for similar complaints but left AMA.  Subsequently patient was admitted at [facility] for similar complaint he underwent CT PE which was positive for pulmonary embolism.  He was evaluated by vascular surgery, recommended no further treatment anticoagulation.  XR tablet Take 3 tablets by mouth daily. 14   Mac Jorgensen, DO   sertraline (ZOLOFT) 50 MG tablet Take 1 tablet by mouth daily. 14   Mca Jorgensen DO   albuterol (PROVENTIL HFA) 108 (90 BASE) MCG/ACT inhaler Inhale 1-2 puffs into the lungs every 4 hours as needed for Wheezing. 14   Noam Pérez DO   butalbital-acetaminophen-caffeine (FIORICET) per tablet Take 1 tablet by mouth every 6 hours as needed for Headaches. 13   Aidee Moss MD       SOCIAL HISTORY     Social History       Tobacco History       Smoking Status  Every Day Current Packs/Day  1 pack/day Smoking Tobacco Type  Cigarettes   Pack Year History     Packs/Day From To Years    1   0.0      Smokeless Tobacco Use  Never      Tobacco Comments  2-3 cigarettes a day              Alcohol History       Alcohol Use Status  Yes Drinks/Week  21 Cans of beer per week Amount  21.0 standard drinks of alcohol/wk              Drug Use       Drug Use Status  No              Sexual Activity       Sexually Active  Not Asked                      FAMILY HISTORY     No family history on file.    PHYSICAL EXAM     Vitals: /81   Pulse 92   Temp 97.6 °F (36.4 °C)   Resp 19   Wt 65.5 kg (144 lb 6.4 oz)   SpO2 95%   BMI 22.66 kg/m²   Tmax: Temp (24hrs), Av.6 °F (36.4 °C), Min:97.6 °F (36.4 °C), Max:97.6 °F (36.4 °C)    Last Body weight:   Wt Readings from Last 3 Encounters:   25 65.5 kg (144 lb 6.4 oz)   25 65.5 kg (144 lb 6.4 oz)   25 55.4 kg (122 lb 2.2 oz)     Body Mass Index : Body mass index is 22.66 kg/m².      Physical Exam  Constitutional:       General: He is not in acute distress.     Appearance: Normal appearance. He is normal weight. He is ill-appearing.   HENT:      Mouth/Throat:      Mouth: Mucous membranes are moist.   Cardiovascular:      Rate and Rhythm: Normal rate and regular rhythm.      Pulses: Normal pulses.      Heart sounds: Normal heart sounds.   Pulmonary:      Breath sounds: Rhonchi  mg/dL   Troponin    Collection Time: 03/11/25 10:11 PM   Result Value Ref Range    Troponin, High Sensitivity 42 (H) 0 - 22 ng/L   Brain Natriuretic Peptide    Collection Time: 03/11/25 10:11 PM   Result Value Ref Range    NT Pro-BNP 18,499 (H) 0 - 125 pg/mL   Lactic Acid    Collection Time: 03/11/25 10:11 PM   Result Value Ref Range    Lactic Acid, Whole Blood 4.2 (H) 0.7 - 2.1 mmol/L   Culture, Blood 1    Collection Time: 03/11/25 10:16 PM    Specimen: Blood   Result Value Ref Range    Specimen Description .BLOOD     Special Requests LFA 3ML     Culture NO GROWTH 1 HOUR    COVID-19, Rapid    Collection Time: 03/11/25 10:34 PM    Specimen: Nasopharyngeal Swab   Result Value Ref Range    Specimen Description .NASOPHARYNGEAL SWAB     SARS-CoV-2, Rapid Indeterminate (A) Not Detected   RAPID INFLUENZA A/B ANTIGENS    Collection Time: 03/11/25 10:34 PM    Specimen: Nasopharyngeal   Result Value Ref Range    Flu A Antigen NEGATIVE NEGATIVE    Flu B Antigen NEGATIVE NEGATIVE   Troponin    Collection Time: 03/11/25 11:19 PM   Result Value Ref Range    Troponin, High Sensitivity 39 (H) 0 - 22 ng/L   Lactic Acid    Collection Time: 03/12/25  1:10 AM   Result Value Ref Range    Lactic Acid, Whole Blood 2.4 (H) 0.7 - 2.1 mmol/L   Protime-INR    Collection Time: 03/12/25  1:10 AM   Result Value Ref Range    Protime 16.3 (H) 11.7 - 14.9 sec    INR 1.3    Anti-XA, Heparin    Collection Time: 03/12/25  1:10 AM   Result Value Ref Range    Anti-XA Unfrac Heparin 0.40 IU/L   APTT    Collection Time: 03/12/25  1:10 AM   Result Value Ref Range    APTT 30.5 23.0 - 36.5 sec       Imaging:   XR CHEST PORTABLE  Result Date: 3/11/2025  Stable examination with perihilar opacities that may represent pulmonary fibrosis. Superimposed pneumonia or pulmonary edema are also in the differential.  Given persistence findings, chest CT should be considered for further evaluation. Cardiomegaly. Small left pleural effusion.       ASSESSMENT & PLAN

## 2025-03-12 NOTE — PROGRESS NOTES
Writer called into pts room. Pt verbally aggressive stating \"you guys are over oxygenating me\" \"turn my oxygen down\". Writer did as pt requested taking him from 10L non-heated high flow down to 2L non-heated high flow per pt request. Pt O2 saturation is btwn 84-88%. Writer educated pt on importance of proper oxygenation. Pt stating he does not need more O2 than 2L. Pt requesting to speak w his doctors to be informed of his medical care. Charge RN notified primary team to come to bedside and speak with patient.

## 2025-03-12 NOTE — ED NOTES
Pt presents to ED via EMS with c/o right sided chest pain and SOB. Pt denies injuries. Pt is alert and oriented. Pt states he has been treating for pneumonia for a month, recent dx of CHF and wears 2-3LNC at home. Pt states it has gotten worse today. Pt arrives on 2LNC.

## 2025-03-12 NOTE — PLAN OF CARE
During assessment of the patient, he is agitated and not answering questions about what is bringing him into the hospital. He states that he keeps coming back here and is not getting better. He continues to leave AMA and refuse treatment here. He keeps repeating that he has bowel movements on himself everyday in public multiple times which is irrelevant to the questions being asked. Did order C-diff and GI panel and started patient on Lactobacillius. Unclear if he is compliant with his medications at home. The patient does state that he was taking all the medications prescribed to him from [facility]. Per discharge summary from [facility]. the patient was recommended to take the following medications on discharge: eliquis, seroquel 50mg nightly, lasix 40mg oral daily, mucinex, lisinopril 10mg daily, toprol xl 12.5mg daily, oxazepam 30mg nightly, aldactone 12.5mg daily, and albuterol He continues to threaten to janice myself and colleagues stating that he is a .     We have consulted infectious disease to manage his infection. Cardiology to manage his severe aortic regurg and psychiatry to manage his bipolar disorder. He has refused on previous admissions.     Patient is requesting to be transferred to [facility]. We do have all the resources available for him here but he would like to be transferred. This has been initiated. He was offered branch of [facility] at [place] but he states that he only wants to go to the main campus.     I spoke to MICU attending at [facility].  I went through the patient's extensive medical history and treatment he is receiving here at [facility].  The attending physician noted that we are doing everything that the patient would need and are giving him the appropriate care and that transfer to [facility] is not necessary at this time.  However, they will gladly accept the patient if he comes on his own.     Pt has decided to leave AMA. States that he  will take a bus to [facility]. He understands and assumes the risks of leaving AMA including death.     Marcelina Concepcion MD  Internal Medicine Resident, PGY-1  [place], [place]  3/12/2025,12:21 PM

## 2025-03-12 NOTE — ED NOTES
Pt upset personal oxygen tank was not plugged in while pt was in ED.   Pt yelling at writer and [place] BRENNAN.  Dariel BRENNAN, ED coordinator, bedside speaking with pt.

## 2025-03-12 NOTE — ED NOTES
ED to inpatient nurses report      Chief Complaint:  Chief Complaint   Patient presents with    Shortness of Breath    Chest Pain     Present to ED from: Home    MOA:     LOC: alert and orientated to name, place, date  Mobility: Independent  Oxygen Baseline: 86    Current needs required: BiPAP   Pending ED orders: Lactic after fluids, Urine  Present condition: stable    Why did the patient come to the ED? Pt presents to ED via EMS with c/o right sided chest pain and SOB. Pt denies injuries. Pt is alert and oriented. Pt states he has been treating for pneumonia for a month, recent dx of CHF and wears 2-3LNC at home. Pt states it has gotten worse today. Pt arrives on 2LNC.   What is the plan? Resp support, IV abx  Any procedures or intervention occur? BiPAP  Any safety concerns?? COVID+    Mental Status:  Level of Consciousness: Alert (0)    Psych Assessment:   Psychosocial  Psychosocial (WDL): Within Defined Limits  Vital signs   Vitals:    03/11/25 2333 03/11/25 2354 03/12/25 0015 03/12/25 0016   BP:  131/89 (!) 128/91    Pulse:  (!) 109 98    Resp:  29  16   Temp: 97.6 °F (36.4 °C)      SpO2:  100% 100%    Weight:            Vitals:  Patient Vitals for the past 24 hrs:   BP Temp Pulse Resp SpO2 Weight   03/12/25 0016 -- -- -- 16 -- --   03/12/25 0015 (!) 128/91 -- 98 -- 100 % --   03/11/25 2354 131/89 -- (!) 109 29 100 % --   03/11/25 2333 -- 97.6 °F (36.4 °C) -- -- -- --   03/11/25 2330 135/82 -- 92 26 99 % --   03/11/25 2315 (!) 139/94 -- 84 19 98 % --   03/11/25 2245 137/82 -- 92 29 98 % --   03/11/25 2227 -- -- 99 26 99 % 65.5 kg (144 lb 6.4 oz)   03/11/25 2216 -- -- 94 28 99 % --   03/11/25 2215 133/84 -- 93 29 98 % --   03/11/25 2209 -- -- 100 28 95 % --   03/11/25 2207 (!) 153/93 -- -- -- -- --   03/11/25 2205 (!) 153/93 -- (!) 104 16 (!) 87 % --      Visit Vitals  BP (!) 128/91   Pulse 98   Temp 97.6 °F (36.4 °C)   Resp 16   Wt 65.5 kg (144 lb 6.4 oz)   SpO2 100%   BMI 22.66 kg/m²        LDAs:   Peripheral    Food Insecurity: No Food Insecurity (3/2/2025)    Received from [facility]    Hunger Screening     Within the past 12 months we worried whether our food would run out before we got money to buy more.: Never True     Within the past 12 months the food we bought just didn't last and we didn't have money to get more.: Never True   Transportation Needs: No Transportation Needs (3/2/2025)    Received from [facility]    PRAPARE - Transportation     Lack of Transportation (Medical): No     Lack of Transportation (Non-Medical): No   Housing Stability: Low Risk  (3/2/2025)    Received from [facility]    Housing Instability     Are you worried or concerned that in the next two months you may not have stable housing that you own, rent or stay in as a part of a household?: No       FAMILY HISTORY     No family history on file.    ALLERGIES     Reglan [metoclopramide] and Pcn [penicillins]    CURRENT MEDICATIONS       Previous Medications    ALBUTEROL (PROVENTIL HFA) 108 (90 BASE) MCG/ACT INHALER    Inhale 1-2 puffs into the lungs every 4 hours as needed for Wheezing.    BUTALBITAL-ACETAMINOPHEN-CAFFEINE (FIORICET) PER TABLET    Take 1 tablet by mouth every 6 hours as needed for Headaches.    CETYLPYRIDINIUM CHLORIDE (CEPACOL MOUTHWASH/GARGLE) 0.05 % LIQD    Take 1 lozenge by mouth 4 times daily as needed (Cough)    DIVALPROEX (DEPAKOTE) 500 MG DR TABLET    Take 1 tablet by mouth 3 times daily    FUROSEMIDE (LASIX) 20 MG TABLET    Take 1 tablet by mouth daily    LISINOPRIL (PRINIVIL;ZESTRIL) 10 MG TABLET    Take 1 tablet by mouth daily    METOPROLOL SUCCINATE (TOPROL XL) 25 MG EXTENDED RELEASE TABLET    Take 0.5 tablets by mouth daily    OXAZEPAM (SERAX) 30 MG CAPSULE    Take 1 capsule by mouth.    OXYCODONE-ACETAMINOPHEN (PERCOCET) 5-325 MG PER TABLET    Take 1 tablet by mouth every 8 hours as needed for Pain    QUETIAPINE (SEROQUEL XR) 50 MG XR TABLET    Take 3 tablets by mouth daily.     SERTRALINE (ZOLOFT) 50 MG TABLET    Take 1 tablet by mouth daily.    SPIRONOLACTONE (ALDACTONE) 25 MG TABLET    Take 0.5 tablets by mouth daily    TAMSULOSIN (FLOMAX) 0.4 MG CAPSULE    Take 1 capsule by mouth daily    TRAZODONE (DESYREL) 100 MG TABLET    Take 1 tablet by mouth Daily.     Orders Placed This Encounter   Medications    magnesium sulfate 2000 mg in 50 mL IVPB premix    dexAMETHasone (PF) (DECADRON) injection 10 mg    furosemide (LASIX) injection 20 mg    cefTRIAXone (ROCEPHIN) 1,000 mg in sterile water 10 mL IV syringe     Antimicrobial Indications:   Pneumonia (CAP)    azithromycin (ZITHROMAX) 500 mg in sodium chloride 0.9 % 250 mL IVPB ([id number])     Antimicrobial Indications:   Pneumonia (CAP)    sodium chloride 0.9 % bolus 500 mL    ondansetron (ZOFRAN) injection 4 mg       SURGICAL HISTORY       Past Surgical History:   Procedure Laterality Date    CYSTOSCOPY  12/18/12    Rt steinstrasse    HERNIA REPAIR  3/2010    LITHOTRIPSY  12/18/12    Rt holmium laser     URETEROSCOPY      Rt       PAST MEDICAL HISTORY       Past Medical History:   Diagnosis Date    Chronic kidney disease     kidney stones    Degenerative disk disease     Cervical    Depression     Hematuria     Paranoia (HCC)     Schizophrenia (HCC)        Labs:  Labs Reviewed   COVID-19, RAPID - Abnormal; Notable for the following components:       Result Value    SARS-CoV-2, Rapid Indeterminate (*)     All other components within normal limits   CBC WITH AUTO DIFFERENTIAL - Abnormal; Notable for the following components:    Neutrophils % 75 (*)     Lymphocytes % 18 (*)     Eosinophils % 0 (*)     All other components within normal limits   BASIC METABOLIC PANEL - Abnormal; Notable for the following components:    Anion Gap 17 (*)     Glucose 119 (*)     All other components within normal limits   TROPONIN - Abnormal; Notable for the following components:    Troponin, High Sensitivity 42 (*)     All other components within normal limits    TROPONIN - Abnormal; Notable for the following components:    Troponin, High Sensitivity 39 (*)     All other components within normal limits   BRAIN NATRIURETIC PEPTIDE - Abnormal; Notable for the following components:    NT Pro-BNP 18,499 (*)     All other components within normal limits   LACTIC ACID - Abnormal; Notable for the following components:    Lactic Acid, Whole Blood 4.2 (*)     All other components within normal limits   CULTURE, BLOOD 1   CULTURE, BLOOD 1   RAPID INFLUENZA A/B ANTIGENS   URINALYSIS WITH REFLEX TO CULTURE   LACTIC ACID       Electronically signed by Endy Powell RN on 3/12/2025 at 12:46 AM

## 2025-03-12 NOTE — ED NOTES
Pt refusing to go to CT.  Pt states \"I'm not going anywhere without getting something to eat and drink. I have been here for hours. I am going to check out, sit in the lobby, eat and drink, and then check myself back in.\"    Primary nurse notified.   Writer contacted admitting team.   AMA form provided, pt refusing to sign.   Pt informed of risk by Dr Leal in the ED, with [place] RN bedside.  Pt continues to want to leave.   Pt alert and oriented x4.

## 2025-03-12 NOTE — ED PROVIDER NOTES
Faculty Sign-Out Attestation  Handoff taken on the following patient from prior Attending Physician: Nicholas  Note Started: 10:58 PM EDT    I was available and discussed any additional care issues that arose and coordinated the management plans with the resident(s) caring for the patient during my duty period. Any areas of disagreement with resident’s documentation of care or procedures are noted on the chart. I was personally present for the key portions of any/all procedures during my duty period. I have documented in the chart those procedures where I was not present during the key portions.    Sob, cp, fluid overload, pneumonia - abx - septic eval > admitting    Vince Leal DO  Attending Physician       Vince Leal DO  03/11/25 [id number]

## 2025-03-12 NOTE — CONSULTS
Department of Psychiatry  Consult Service   Psychiatric Assessment        REASON FOR CONSULT: History of Bipolar Disorder. Continues to be aggressive with staff.  Pressured speech - medication adjustment    CONSULTING PHYSICIAN: Dr. Marcelina Concepcion    History obtained from: Patient, staff and EMR    HISTORY OF PRESENT ILLNESS:          The patient is a 64 y.o. male with significant psychiatric history of Bipolar Disorder who presents to the ER with shortness of breath.  Per ER documentation patient was reportedly recently diagnosed with pneumonia and has a history of COPD.  Patient complaining of chest pain and shortness of breath.  While in the ER patient was persistently requiring BiPAP and is admitted medically for acute respiratory failure secondary to CHF vs. COPD exacerbation and underlying pneumonia.  He has been medically admitted a couple of different times over the past month and has left AMA.  It appears that patient becomes irritable and agitated easily.  He has been threatening to janice the health care providers and asks for a .  He is requesting transfer to [facility].  Per review of chart it does not appear that he has had any recent psychiatric admissions.  It does report a history of Bipolar Disorder in his problem list.  Per review of home medications it seems he has been on Depakote 1500 mg nightly, Trazodone 100 mg nightly, and Zoloft 50 mg daily as well as Seroquel  mg nightly.   From dispense report it seems the only medication that has recently been prescribed is the Seroquel  mg nightly.  This was last filled March 6 2025 for a 30 day supply.  Unsure if patient has been compliant with this medication or not.      Did see James at bedside today.  He reports that he is trying to \"go to [facility].\"  He does admit he is admitted for chest pains.  Attempted to introduce self and roll and patient states, \"I don't really want to talk right now.\"  Attempted to further  40 mEq, 40 mEq, Oral, PRN **OR** potassium bicarb-citric acid (EFFER-K) effervescent tablet 40 mEq, 40 mEq, Oral, PRN **OR** potassium chloride 10 mEq/100 mL IVPB (Peripheral Line), 10 mEq, IntraVENous, PRN  magnesium sulfate 2000 mg in 50 mL IVPB premix, 2,000 mg, IntraVENous, PRN  ondansetron (ZOFRAN-ODT) disintegrating tablet 4 mg, 4 mg, Oral, Q8H PRN **OR** ondansetron (ZOFRAN) injection 4 mg, 4 mg, IntraVENous, Q6H PRN  polyethylene glycol (GLYCOLAX) packet 17 g, 17 g, Oral, Daily PRN  acetaminophen (TYLENOL) tablet 650 mg, 650 mg, Oral, Q6H PRN **OR** acetaminophen (TYLENOL) suppository 650 mg, 650 mg, Rectal, Q6H PRN  heparin (porcine) injection 5,200 Units, 80 Units/kg, IntraVENous, PRN  heparin (porcine) injection 2,600 Units, 40 Units/kg, IntraVENous, PRN  heparin 25,000 units in dextrose 5% 250 mL (premix) infusion, 5-30 Units/kg/hr, IntraVENous, Continuous  albuterol (PROVENTIL) (2.5 MG/3ML) 0.083% nebulizer solution 2.5 mg, 2.5 mg, Nebulization, Q6H PRN  spironolactone (ALDACTONE) tablet 12.5 mg, 12.5 mg, Oral, Daily  furosemide (LASIX) injection 60 mg, 60 mg, IntraVENous, BID  azithromycin (ZITHROMAX) tablet 250 mg, 250 mg, Oral, Daily  tamsulosin (FLOMAX) capsule 0.4 mg, 0.4 mg, Oral, Daily  QUEtiapine (SEROQUEL XR) extended release tablet 150 mg, 150 mg, Oral, Daily RT  ceFEPIme (MAXIPIME) 2,000 mg in sodium chloride 0.9 % 100 mL IVPB (addEASE), 2,000 mg, IntraVENous, Q12H  [START ON 3/13/2025] lactobacillus (CULTURELLE) capsule 1 capsule, 1 capsule, Oral, Daily with breakfast     Physical:    Vitals:  /66   Pulse 95   Temp 98.1 °F (36.7 °C) (Oral)   Resp 22   Ht 1.7 m (5' 6.93\")   Wt 65.5 kg (144 lb 6.4 oz)   SpO2 98%   BMI 22.66 kg/m²      Qtc: 496    Neuro Exam:   Muscle Strength & Tone: full ROM    Involuntary Movements: No    Mental Status Examination:  Level of consciousness:  Awake and alert  Appearance: hospital attire, lying in bed, fair grooming  Behavior/Motor:  Irritable  Attitude toward examiner:  Noncooperative, inattentive  Speech:  Spontaneous, normal rate and volume  Mood:  \"I'm fine I dont need anything\"  Affect: Irritable  Thought processes:  Linear and coherent  Thought content: Denies suicidal ideations   Denies homicidal ideations    Denies hallucinations   Denies delusions  Cognition:  Oriented to self, situation, location, date  Concentration clinically adequate  Memory age appropriate  Insight & Judgment:  fair    DSM-5 DIAGNOSIS:      Acute Stress Reaction    Stressors     Severity of stressors is mild  Source of stressors include:  Other psychosocial and environmental stressors    Plan:    Patient does not require inpatient psychiatric care at this time  No overt signs of nas currently  Would suggest continuing home medications including Seroquel  mg nightly  Can follow up with outpatient psychiatrist  Attending psychiatrist to see patient as well   Additional recommendations will follow the clinical course.       Thank you very much for allowing us to participate in the care of this patient.      Electronically signed by REJI Gomes CNP on 3/12/25 at 1:08 PM EDT    Please note that this chart was generated using voice recognition Dragon dictation software.  Although every effort was made to ensure the accuracy of this automated transcription, some errors in transcription may have occurred.

## 2025-03-12 NOTE — PLAN OF CARE
Examined patient at bedside twice this morning by with Dr Concepcion present and then later in the morning on rounds in the presence of Dr Marcelina Concepcion MD and Dr. Lovely Patel MD    In the morning the patient was upset with his care due to the amount of blood draws both here and at [facility]. I discussed with him that these  blood draws were necessary to ensure no abnormalities of his blood work due to him being on diuretics, having heart failure, to look for anemia and other reasons due to acute status. Also mentioned that IV access is prudent in the hospital due to need for IV medications. Patient did also complain that he did not need oxygen above what he normally requires and he was upset that we were giving more than 2L, like he is at home. I reminded the patient that he is in acute heart failure due to systolic dysfunction. Also the patient has severe AR. Patient did have 1+ edema on bilateral legs on my exam. Also did have rales throughout his lung fields. I also reminded the patient that he has recurrently left AMA over the past month which inturn means his diseases were not adequately treated and that proper medication prescription were not made due to him leaving AMA    Later in the morning Dr Patel was present at bedside while on rounds. Patient complaining that he has had persistent diarrhea over the past 30 days and has lost weight due to this. He again was upset that we were giving more oxygen than his baseline. Also he felt that both [facility] and [facility] did not treat his disease process adequately. It is the patient's preference that he be transferred to [facility]. We stated that we will transfer to [facility], but this transfer would be under patient preference due to the fact that his heart failure and his aoritc regurgitation can be treated at [facility] with cardiologist and structural heart trained cardiologist.     The patient does state that he was taking all the  medications prescribed to him from [facility]. Per discharge summary from [facility] physician Dr. Aquino 3/2/25 the patient was recommended to take the following medications on discharge: eliquis, seroquel 50mg nightly, lasix 40mg oral daily, mucinex, lisinopril 10mg daily, toprol xl 12.5mg daily, oxazepam 30mg nightly, aldactone 12.5mg daily, and albuterol    It should also be noted that patient was noted to have mild to moderate MR and moderate to sever AR. For this it was recommended on a past admission that the patient get TORI to see if he would be candidate for SAVR vs TAVR. In past admission he denied the TORI. It was recommended he follow outpatient for TORI evaluation with cardiologist. He has yet to do this due to recurrent admissions.     Throughout our discussion the patient was agitated and interruptive while physicians were explaining his care, inquiring about his complaints, and understanding the patient's view of why he feels his care is inadequate. At times, patient was making comments that did not correlate with the questions asked by providers. He is not maniac, delusional or hallucinating at the time of interactions    The patient did state in the past that he has had numerous lawsuits and that we should google his name in the [employer]. Also mentioned that he is a law school graduate and may consider suing this hospital in the future. Also mentioned that he is very involved in his [employer] and provides meals for many people in his community.     Orders discussed on rounds: psychiatry consult, ID consult, cdiff stool antigen, restarting home seroquel, cardiology consult, stool molecular panel, urinalysis, urine culture, obtaining ABG, starting bronchdilators, and starting corticosteroids.     Also, transfer to [facility] was initiated, but this transfer is for patient preference not for necessity of higher level of care.     Vivek Gonzalez MD  Internal medicine resident   3/12/25

## 2025-03-12 NOTE — ED PROVIDER NOTES
[facility] EMERGENCY DEPARTMENT  Emergency Department Encounter  Emergency Medicine Resident     Pt Name:James Gabriel  MRN: [id number]  Birthdate 1960  Date of evaluation: 3/11/25  PCP:  Ishan Feliz MD  Note Started: 10:35 PM EDT      CHIEF COMPLAINT       Chief Complaint   Patient presents with    Shortness of Breath    Chest Pain       HISTORY OF PRESENT ILLNESS  (Location/Symptom, Timing/Onset, Context/Setting, Quality, Duration, Modifying Factors, Severity.)      James Gabriel is a 64 y.o. male who presents with shortness of breath.  Patient reportedly was recently diagnosed with pneumonia.  Has a history of COPD on 2 L of oxygen at home.  Patient complaining of chest pain and shortness of breath.  Has been going since he was diagnosed with pneumonia.  He states he does not know his past medical history.  Describes chest pain as substernal, nondraining to the back of the abdomen.  Patient placed on BiPAP by respiratory therapy prior to my arrival.  Denies any abdominal pain, nausea, vomiting, diarrhea, fever, chills or any other complaints.    PAST MEDICAL / SURGICAL / SOCIAL / FAMILY HISTORY      has a past medical history of Chronic kidney disease, Degenerative disk disease, Depression, Hematuria, Paranoia (HCC), and Schizophrenia ([facility]).     has a past surgical history that includes hernia repair (3/2010); Cystoscopy (12/18/12); Ureteroscopy; and Lithotripsy (12/18/12).      Social History     Socioeconomic History    Marital status:      Spouse name: Not on file    Number of children: Not on file    Years of education: Not on file    Highest education level: Not on file   Occupational History    Not on file   Tobacco Use    Smoking status: Every Day     Current packs/day: 1.00     Types: Cigarettes    Smokeless tobacco: Never    Tobacco comments:     2-3 cigarettes a day   Substance and Sexual Activity    Alcohol use: Yes     Alcohol/week: 21.0 standard drinks of alcohol      and dry.      Capillary Refill: Capillary refill takes less than 2 seconds.   Neurological:      Mental Status: He is alert.           DDX/DIAGNOSTIC RESULTS / EMERGENCY DEPARTMENT COURSE / MDM     Medical Decision Making  James Gabriel is a 64 y.o. male who presents with shortness of breath, chest pain.  Patient is GCS 15, nontoxic appearing, not in acute distress, speaking full sentences, able to ambulate under their own power.  Review shows recent pneumonia diagnosed.  Patient completed course of antibiotics.  Hypoxic.  Placed on BiPAP.  Rhonchi decreased breath sounds bilaterally.  Patient received nebulizer therapy, septic workup, cardiac workup, likely admission.    Amount and/or Complexity of Data Reviewed  Labs: ordered.  Radiology: ordered.  ECG/medicine tests: ordered.    Risk  Prescription drug management.  Decision regarding hospitalization.      Sepsis Times and Checklist  Vital Signs: BP: 131/89  Pulse: (!) 109  Respirations: 29  Temp: 97.6 °F (36.4 °C) SpO2: 100 %  SIRS (>2) Non Pregnant       Temp > 38.3C or < 36C   HR > 90   RR > 20   WBC > 12 or < 4 or >10% bands  SIRS (>2) Pregnant 20 weeks until 3 days postpartum   Temp > 38C or <36C   HR >110   RR > 24   WBC >15 or < 4 or >10% bands   SIRS (>2) and confirmed or suspected source of infection = Sepsis  Is Sepsis due to likely bacterial infection?: Yes      Sepsis Identified:  Date: 3/11/25   Time: 10:15pm  Sepsis Orders:   CBC(required): Yes   CMP: Yes   PT/PTT: No   Blood Cultures x2(required): Yes   Urinalysis and Urine Culture: Yes   Lactate(required): Yes   Antibiotics Given (within 3 hours of sepsis identification, after blood cultures):  Yes    (If unable to obtain IV access for IV antibiotics within 3 hours of identification of sepsis, IM antibiotics is acceptable.)        Antibiotics were given prior to blood cultures obtained, because delay of antibiotics would be detrimental  to the patient.        If Lactate >2.0 MUST repeat

## 2025-03-12 NOTE — PROGRESS NOTES
I signed up for this patient in error. I did not see, evaluate, contribute or participate in the care of this patient.    Ev Sears MD  Emergency Medicine Resident  03/11/25 10:26 PM

## 2025-03-13 LAB
C DIFF GDH + TOXINS A+B STL QL IA.RAPID: NEGATIVE
CAMPYLOBACTER DNA SPEC NAA+PROBE: NORMAL
ETEC ELTA+ESTB GENES STL QL NAA+PROBE: NORMAL
MICROORGANISM SPEC CULT: ABNORMAL
MRSA, DNA, NASAL: NEGATIVE
P SHIGELLOIDES DNA STL QL NAA+PROBE: NORMAL
SALMONELLA DNA SPEC QL NAA+PROBE: NORMAL
SERVICE CMNT-IMP: ABNORMAL
SHIGA TOXIN STX GENE SPEC NAA+PROBE: NORMAL
SHIGELLA DNA SPEC QL NAA+PROBE: NORMAL
SPECIMEN DESCRIPTION: ABNORMAL
SPECIMEN DESCRIPTION: NORMAL
V CHOL+PARA RFBL+TRKH+TNAA STL QL NAA+PR: NORMAL
Y ENTERO RECN STL QL NAA+PROBE: NORMAL

## 2025-03-14 LAB — M PNEUMO IGM SER QL IA: 0.42

## 2025-03-16 LAB
MICROORGANISM SPEC CULT: NORMAL
SERVICE CMNT-IMP: NORMAL
SPECIMEN DESCRIPTION: NORMAL

## 2025-03-16 NOTE — DISCHARGE SUMMARY
[facility]     Department of Internal Medicine - Staff Internal Medicine Teaching Service    INPATIENT DISCHARGE SUMMARY      Patient Identification:  James Gabriel is a 64 y.o. male.  :  1960  MRN: [id number]     Acct: [id number]   PCP: Ishan Feliz MD  Admit Date:  3/11/2025  Discharge date and time: 3/12/2025  3:39 PM   Attending Provider: No att. providers found                                     ACTIVE DISCHARGE DIAGNOSES     Hospital Problem Lists:  Principal Problem:    Acute hypoxic respiratory failure (HCC)  Active Problems:    Acute on chronic systolic CHF (congestive heart failure) (HCC)    Essential hypertension    Schizophrenia (HCC)    Bipolar 1 disorder, depressed (HCC)    Pneumonia due to infectious organism    Nonischemic cardiomyopathy (HCC)    COPD exacerbation (HCC)    Coronavirus infection  Resolved Problems:    * No resolved hospital problems. *      HOSPITAL STAY     Brief Inpatient course:   James Gabriel is a 64 y.o. male who was admitted for the management of Acute systolic CHF (congestive heart failure) (HCC), presented to the emergency department with PMHx significant for      Chronic HFrEF with EF 20%  CKD  History of side effect of her bipolar disorder  History of BPH  History of multiple hospitalization recently, mostly leaves AMA     presented with a chief complaint of shortness of breath.  Of note patient was recently evaluated and admitted 2025 for concerns of similar complaints shortness of breath and found to have acute on chronic CHF exacerbation.  Patient was being evaluated and TTE showed severe systolic dysfunction with EF of 20-25%.  Before his evaluation could be completed with TORI as recommend by cardiology patient left AMA.  Also recently was hospitalized in [facility] where he left AMA after left heart catheterization.    He was also seen in the hospital again in Feb where he left AMA. He went to [facility]  mouth daily, Disp-30 tablet, R-3Normal      furosemide (LASIX) 20 MG tablet Take 1 tablet by mouth daily, Disp-60 tablet, R-3Normal      lisinopril (PRINIVIL;ZESTRIL) 10 MG tablet Take 1 tablet by mouth dailyHistorical Med      divalproex (DEPAKOTE) 500 MG DR tablet Take 1 tablet by mouth 3 times dailyHistorical Med      oxazepam (SERAX) 30 MG capsule Take 1 capsule by mouth.Historical Med      tamsulosin (FLOMAX) 0.4 MG capsule Take 1 capsule by mouth dailyHistorical Med      oxyCODONE-acetaminophen (PERCOCET) 5-325 MG per tablet Take 1 tablet by mouth every 8 hours as needed for Pain, Disp-20 tablet, R-0      traZODone (DESYREL) 100 MG tablet Take 1 tablet by mouth Daily., Disp-7 tablet, R-0      QUEtiapine (SEROQUEL XR) 50 MG XR tablet Take 3 tablets by mouth daily., Disp-21 tablet, R-0      sertraline (ZOLOFT) 50 MG tablet Take 1 tablet by mouth daily., Disp-7 tablet, R-0      albuterol (PROVENTIL HFA) 108 (90 BASE) MCG/ACT inhaler Inhale 1-2 puffs into the lungs every 4 hours as needed for Wheezing., Disp-1 Inhaler, R-0      butalbital-acetaminophen-caffeine (FIORICET) per tablet Take 1 tablet by mouth every 6 hours as needed for Headaches., Disp-20 tablet, R-0           STOP taking these medications       guaiFENesin (MUCINEX) 600 MG extended release tablet Comments:   Reason for Stopping:               Activity: activity as tolerated    Diet: cardiac diet    Follow-up:    No follow-up provider specified.    Patient Instructions: Left AMA    Note that over 30 minutes was spent in preparing discharge papers, discussing discharge with patient, medication review, etc.      Marcelina Concepcion MD, MD  Internal Medicine Resident, PGY-1  [place], [place].  3/16/2025, 9:05 AM

## 2025-03-20 NOTE — PROGRESS NOTES
Physician Progress Note      PATIENT:               KAYA JOHNSON  CSN #:                  [id number]  :                       1960  ADMIT DATE:       3/11/2025 10:04 PM  DISCH DATE:        3/12/2025 3:39 PM  RESPONDING  PROVIDER #:        ASHLEE SINHA          QUERY TEXT:    Patient was admitted with pneumonia and was documented to have PMH COPD and   recent  diagnosed pneumonia treated with antibiotics. During admission, patient was   treated with  Cefepime and left AMA prior to completing treatment. Based on clinical   indicators and treatment,  can the suspected type of pneumonia be further specified as:    The medical record reflects the following:  Risk Factors: Acute on chronic systolic CHF, CKD, COPD with O2 dependence  Clinical Indicators: /66 HR  RR 16-29 SpO2 98% on 10L high flow   nasal cannula, Lactic acid 2.4-4.2 WBC 8.9  3/12 DC summary-patient had recent admission on  with similar concerns of   SOB and found to have acute on chronic CHF.  Was being evaluated and TTE   showed severe systolic dysfunction EF of 20% before eval could be completed   patient left AMA patient was tachycardic tachypneic and required BiPAP was   able to wean from BiPAP and placed on 10L NC resuming home O2 level  3/12 CXR pulmonary fibrosis superimposed pneumonia cardiomegaly and small left   pleural effusion  Treatment: IVAB Rocephin and azithromycin, ID consult, CXR and CT of the chest  Options provided:  -- Gram negative pneumonia  -- Other - I will add my own diagnosis  -- Disagree - Not applicable / Not valid  -- Disagree - Clinically unable to determine / Unknown  -- Refer to Clinical Documentation Reviewer    PROVIDER RESPONSE TEXT:    Pt had single positive blood culture for Staph epi. This is likely a   contaminate.    Query created by: Dee Murphy on 3/17/2025 11:12 AM      QUERY TEXT:    Patient with hypertension, nonischemic cardiomyopathy with EF of 15 to 20%,   severe aortic  regurgitation, and CKD was admitted for acute systolic CHF.   Patient was treated with IV Lasix. After further review, can the etiology of   the CHF be clarified as:    The medical record reflects the following:  Risk Factors: Acute on chronic respiratory failure with hypoxia-pneumonia,   acute on chronic CHF with ischemic heart disease  Clinical Indicators: Troponin 42-32, proBNP 18,499.   3/12 H&P troponin is   elevated and downtrending likely secondary to demand ischemia recent heart   cath on 2/19 with normal coronary arteries  Echo on 3/11/2025 left ventricle normal in size systolic function severely   decreased EF 25-30%-right ventricle mildly dilated-tricuspid valve moderate   regurgitation no evidence of stenosis mild to moderate pulmonary hypertension.    There is severe regurgitation in the aortic valve no evidence of aortic   valve stenosis.  3/12 discharge summary acute on chronic CHF with nonischemic cardiomyopathy   with COPD exacerbation  Treatment: CXR, CT chest ID consult, 40 mg IV Lasix twice daily heparin drip   IV magnesium 2 g p.o. Aldactone  Options provided:  -- CHF due to Hypertensive Heart Disease  -- CHF not due to Hypertension but due to Valvular heart disease.  -- CHF due to Hypertensive Heart Disease and Valvular heart disease.  -- NICM unrelated to hypertension  -- Other - I will add my own diagnosis  -- Disagree - Not applicable / Not valid  -- Disagree - Clinically unable to determine / Unknown  -- Refer to Clinical Documentation Reviewer    PROVIDER RESPONSE TEXT:    This patient has CHF due to hypertensive heart disease.    Query created by: Dee Murphy on 3/17/2025 11:32 AM      Electronically signed by:  ASHLEE SINHA 3/20/2025 9:50 AM

## 2025-07-16 ENCOUNTER — HOSPITAL ENCOUNTER (INPATIENT)
Age: 65
LOS: 1 days | Discharge: HOME | DRG: 291 | End: 2025-07-18
Attending: STUDENT IN AN ORGANIZED HEALTH CARE EDUCATION/TRAINING PROGRAM | Admitting: EMERGENCY MEDICINE
Payer: MEDICARE

## 2025-07-16 ENCOUNTER — APPOINTMENT (OUTPATIENT)
Dept: GENERAL RADIOLOGY | Age: 65
DRG: 291 | End: 2025-07-16
Payer: MEDICARE

## 2025-07-16 DIAGNOSIS — R06.02 SHORTNESS OF BREATH: ICD-10-CM

## 2025-07-16 DIAGNOSIS — R79.89 ELEVATED TROPONIN: ICD-10-CM

## 2025-07-16 DIAGNOSIS — R07.9 CHEST PAIN, UNSPECIFIED TYPE: Primary | ICD-10-CM

## 2025-07-16 DIAGNOSIS — I50.23 ACUTE ON CHRONIC SYSTOLIC CONGESTIVE HEART FAILURE (HCC): ICD-10-CM

## 2025-07-16 LAB
ANION GAP SERPL CALCULATED.3IONS-SCNC: 13 MMOL/L (ref 9–16)
BASOPHILS # BLD: <0.03 K/UL (ref 0–0.2)
BASOPHILS NFR BLD: 1 % (ref 0–2)
BUN SERPL-MCNC: 6 MG/DL (ref 8–23)
CALCIUM SERPL-MCNC: 9.3 MG/DL (ref 8.6–10.4)
CHLORIDE SERPL-SCNC: 101 MMOL/L (ref 98–107)
CO2 SERPL-SCNC: 29 MMOL/L (ref 20–31)
CREAT SERPL-MCNC: 0.8 MG/DL (ref 0.7–1.2)
EOSINOPHIL # BLD: 0.03 K/UL (ref 0–0.44)
EOSINOPHILS RELATIVE PERCENT: 1 % (ref 1–4)
ERYTHROCYTE [DISTWIDTH] IN BLOOD BY AUTOMATED COUNT: 17 % (ref 11.8–14.4)
GFR, ESTIMATED: >90 ML/MIN/1.73M2
GLUCOSE SERPL-MCNC: 125 MG/DL (ref 74–99)
HCT VFR BLD AUTO: 39.6 % (ref 40.7–50.3)
HGB BLD-MCNC: 13.9 G/DL (ref 13–17)
IMM GRANULOCYTES # BLD AUTO: <0.03 K/UL (ref 0–0.3)
IMM GRANULOCYTES NFR BLD: 0 %
LYMPHOCYTES NFR BLD: 1.59 K/UL (ref 1.1–3.7)
LYMPHOCYTES RELATIVE PERCENT: 43 % (ref 24–43)
MCH RBC QN AUTO: 31.5 PG (ref 25.2–33.5)
MCHC RBC AUTO-ENTMCNC: 35.1 G/DL (ref 28.4–34.8)
MCV RBC AUTO: 89.8 FL (ref 82.6–102.9)
MONOCYTES NFR BLD: 0.5 K/UL (ref 0.1–1.2)
MONOCYTES NFR BLD: 13 % (ref 3–12)
NEUTROPHILS NFR BLD: 42 % (ref 36–65)
NEUTS SEG NFR BLD: 1.58 K/UL (ref 1.5–8.1)
NRBC BLD-RTO: 0 PER 100 WBC
PLATELET # BLD AUTO: 186 K/UL (ref 138–453)
PMV BLD AUTO: 9.2 FL (ref 8.1–13.5)
POTASSIUM SERPL-SCNC: 3.2 MMOL/L (ref 3.7–5.3)
RBC # BLD AUTO: 4.41 M/UL (ref 4.21–5.77)
RBC # BLD: ABNORMAL 10*6/UL
SODIUM SERPL-SCNC: 143 MMOL/L (ref 136–145)
TROPONIN I SERPL HS-MCNC: 53 NG/L (ref 0–22)
TROPONIN I SERPL HS-MCNC: 60 NG/L (ref 0–22)
WBC OTHER # BLD: 3.7 K/UL (ref 3.5–11.3)

## 2025-07-16 PROCEDURE — 96374 THER/PROPH/DIAG INJ IV PUSH: CPT

## 2025-07-16 PROCEDURE — 85025 COMPLETE CBC W/AUTO DIFF WBC: CPT

## 2025-07-16 PROCEDURE — 96372 THER/PROPH/DIAG INJ SC/IM: CPT

## 2025-07-16 PROCEDURE — G0378 HOSPITAL OBSERVATION PER HR: HCPCS

## 2025-07-16 PROCEDURE — 2500000003 HC RX 250 WO HCPCS: Performed by: LICENSED PRACTICAL NURSE

## 2025-07-16 PROCEDURE — 71045 X-RAY EXAM CHEST 1 VIEW: CPT

## 2025-07-16 PROCEDURE — 6370000000 HC RX 637 (ALT 250 FOR IP): Performed by: STUDENT IN AN ORGANIZED HEALTH CARE EDUCATION/TRAINING PROGRAM

## 2025-07-16 PROCEDURE — 99285 EMERGENCY DEPT VISIT HI MDM: CPT

## 2025-07-16 PROCEDURE — 80048 BASIC METABOLIC PNL TOTAL CA: CPT

## 2025-07-16 PROCEDURE — 93005 ELECTROCARDIOGRAM TRACING: CPT | Performed by: EMERGENCY MEDICINE

## 2025-07-16 PROCEDURE — 6360000002 HC RX W HCPCS: Performed by: LICENSED PRACTICAL NURSE

## 2025-07-16 PROCEDURE — 6360000002 HC RX W HCPCS: Performed by: EMERGENCY MEDICINE

## 2025-07-16 PROCEDURE — 84484 ASSAY OF TROPONIN QUANT: CPT

## 2025-07-16 PROCEDURE — 6370000000 HC RX 637 (ALT 250 FOR IP): Performed by: LICENSED PRACTICAL NURSE

## 2025-07-16 RX ORDER — DIVALPROEX SODIUM 500 MG/1
500 TABLET, DELAYED RELEASE ORAL 3 TIMES DAILY
Status: DISCONTINUED | OUTPATIENT
Start: 2025-07-16 | End: 2025-07-16 | Stop reason: SDUPTHER

## 2025-07-16 RX ORDER — TRAZODONE HYDROCHLORIDE 50 MG/1
100 TABLET ORAL NIGHTLY
Status: DISCONTINUED | OUTPATIENT
Start: 2025-07-16 | End: 2025-07-18 | Stop reason: HOSPADM

## 2025-07-16 RX ORDER — SODIUM CHLORIDE 0.9 % (FLUSH) 0.9 %
5-40 SYRINGE (ML) INJECTION PRN
Status: DISCONTINUED | OUTPATIENT
Start: 2025-07-16 | End: 2025-07-18 | Stop reason: HOSPADM

## 2025-07-16 RX ORDER — LISINOPRIL 10 MG/1
10 TABLET ORAL DAILY
Status: DISCONTINUED | OUTPATIENT
Start: 2025-07-16 | End: 2025-07-18 | Stop reason: HOSPADM

## 2025-07-16 RX ORDER — KETOROLAC TROMETHAMINE 15 MG/ML
15 INJECTION, SOLUTION INTRAMUSCULAR; INTRAVENOUS EVERY 6 HOURS PRN
Status: DISCONTINUED | OUTPATIENT
Start: 2025-07-16 | End: 2025-07-18 | Stop reason: HOSPADM

## 2025-07-16 RX ORDER — ALBUTEROL SULFATE 90 UG/1
2 INHALANT RESPIRATORY (INHALATION) EVERY 4 HOURS PRN
Status: DISCONTINUED | OUTPATIENT
Start: 2025-07-16 | End: 2025-07-18 | Stop reason: HOSPADM

## 2025-07-16 RX ORDER — SPIRONOLACTONE 25 MG/1
12.5 TABLET ORAL DAILY
Status: DISCONTINUED | OUTPATIENT
Start: 2025-07-16 | End: 2025-07-18 | Stop reason: HOSPADM

## 2025-07-16 RX ORDER — METOPROLOL SUCCINATE 25 MG/1
12.5 TABLET, EXTENDED RELEASE ORAL DAILY
Status: DISCONTINUED | OUTPATIENT
Start: 2025-07-16 | End: 2025-07-18 | Stop reason: HOSPADM

## 2025-07-16 RX ORDER — DIVALPROEX SODIUM 500 MG/1
500 TABLET, DELAYED RELEASE ORAL EVERY 12 HOURS SCHEDULED
Status: DISCONTINUED | OUTPATIENT
Start: 2025-07-16 | End: 2025-07-18 | Stop reason: HOSPADM

## 2025-07-16 RX ORDER — TAMSULOSIN HYDROCHLORIDE 0.4 MG/1
0.4 CAPSULE ORAL DAILY
Status: DISCONTINUED | OUTPATIENT
Start: 2025-07-16 | End: 2025-07-18 | Stop reason: HOSPADM

## 2025-07-16 RX ORDER — ACETAMINOPHEN 650 MG/1
650 SUPPOSITORY RECTAL EVERY 6 HOURS PRN
Status: DISCONTINUED | OUTPATIENT
Start: 2025-07-16 | End: 2025-07-18 | Stop reason: HOSPADM

## 2025-07-16 RX ORDER — ENOXAPARIN SODIUM 100 MG/ML
40 INJECTION SUBCUTANEOUS DAILY
Status: DISCONTINUED | OUTPATIENT
Start: 2025-07-16 | End: 2025-07-18 | Stop reason: HOSPADM

## 2025-07-16 RX ORDER — SODIUM CHLORIDE 9 MG/ML
INJECTION, SOLUTION INTRAVENOUS PRN
Status: DISCONTINUED | OUTPATIENT
Start: 2025-07-16 | End: 2025-07-18 | Stop reason: HOSPADM

## 2025-07-16 RX ORDER — QUETIAPINE FUMARATE 50 MG/1
150 TABLET, EXTENDED RELEASE ORAL DAILY
Status: DISCONTINUED | OUTPATIENT
Start: 2025-07-16 | End: 2025-07-18 | Stop reason: HOSPADM

## 2025-07-16 RX ORDER — ASPIRIN 81 MG/1
324 TABLET, CHEWABLE ORAL ONCE
Status: COMPLETED | OUTPATIENT
Start: 2025-07-16 | End: 2025-07-16

## 2025-07-16 RX ORDER — SODIUM CHLORIDE 0.9 % (FLUSH) 0.9 %
5-40 SYRINGE (ML) INJECTION EVERY 12 HOURS SCHEDULED
Status: DISCONTINUED | OUTPATIENT
Start: 2025-07-16 | End: 2025-07-18 | Stop reason: HOSPADM

## 2025-07-16 RX ORDER — NITROGLYCERIN 0.4 MG/1
0.4 TABLET SUBLINGUAL PRN
Status: DISCONTINUED | OUTPATIENT
Start: 2025-07-16 | End: 2025-07-18 | Stop reason: HOSPADM

## 2025-07-16 RX ORDER — ACETAMINOPHEN 325 MG/1
650 TABLET ORAL EVERY 6 HOURS PRN
Status: DISCONTINUED | OUTPATIENT
Start: 2025-07-16 | End: 2025-07-18 | Stop reason: HOSPADM

## 2025-07-16 RX ORDER — OXYCODONE HYDROCHLORIDE 5 MG/1
5 TABLET ORAL EVERY 4 HOURS PRN
Refills: 0 | Status: DISCONTINUED | OUTPATIENT
Start: 2025-07-16 | End: 2025-07-18 | Stop reason: HOSPADM

## 2025-07-16 RX ADMIN — METOPROLOL SUCCINATE 12.5 MG: 25 TABLET, EXTENDED RELEASE ORAL at 15:18

## 2025-07-16 RX ADMIN — QUETIAPINE FUMARATE 150 MG: 50 TABLET, EXTENDED RELEASE ORAL at 15:21

## 2025-07-16 RX ADMIN — LISINOPRIL 10 MG: 10 TABLET ORAL at 15:18

## 2025-07-16 RX ADMIN — ASPIRIN 324 MG: 81 TABLET, CHEWABLE ORAL at 11:01

## 2025-07-16 RX ADMIN — SERTRALINE 50 MG: 50 TABLET, FILM COATED ORAL at 15:20

## 2025-07-16 RX ADMIN — TAMSULOSIN HYDROCHLORIDE 0.4 MG: 0.4 CAPSULE ORAL at 15:18

## 2025-07-16 RX ADMIN — KETOROLAC TROMETHAMINE 15 MG: 15 INJECTION, SOLUTION INTRAMUSCULAR; INTRAVENOUS at 16:49

## 2025-07-16 RX ADMIN — NITROGLYCERIN 0.4 MG: 0.4 TABLET SUBLINGUAL at 11:02

## 2025-07-16 RX ADMIN — POTASSIUM BICARBONATE 40 MEQ: 782 TABLET, EFFERVESCENT ORAL at 14:57

## 2025-07-16 RX ADMIN — ENOXAPARIN SODIUM 40 MG: 100 INJECTION SUBCUTANEOUS at 14:59

## 2025-07-16 RX ADMIN — SODIUM CHLORIDE, PRESERVATIVE FREE 10 ML: 5 INJECTION INTRAVENOUS at 16:49

## 2025-07-16 RX ADMIN — SPIRONOLACTONE 12.5 MG: 25 TABLET, FILM COATED ORAL at 15:20

## 2025-07-16 ASSESSMENT — PAIN DESCRIPTION - FREQUENCY: FREQUENCY: INTERMITTENT

## 2025-07-16 ASSESSMENT — PAIN SCALES - GENERAL
PAINLEVEL_OUTOF10: 6
PAINLEVEL_OUTOF10: 5
PAINLEVEL_OUTOF10: 5
PAINLEVEL_OUTOF10: 7

## 2025-07-16 ASSESSMENT — ENCOUNTER SYMPTOMS
EYES NEGATIVE: 1
SINUS PAIN: 0
VOMITING: 0
NAUSEA: 0
SHORTNESS OF BREATH: 1
COUGH: 0
SINUS PRESSURE: 0
FACIAL SWELLING: 0
DIARRHEA: 0
ABDOMINAL PAIN: 0
CONSTIPATION: 0

## 2025-07-16 ASSESSMENT — PAIN DESCRIPTION - PAIN TYPE: TYPE: CHRONIC PAIN

## 2025-07-16 ASSESSMENT — PAIN DESCRIPTION - DESCRIPTORS
DESCRIPTORS: SHARP;PRESSURE
DESCRIPTORS: SHARP;PRESSURE

## 2025-07-16 ASSESSMENT — PAIN DESCRIPTION - LOCATION
LOCATION: CHEST
LOCATION: CHEST

## 2025-07-16 ASSESSMENT — PAIN DESCRIPTION - ORIENTATION
ORIENTATION: LEFT
ORIENTATION: LEFT

## 2025-07-16 ASSESSMENT — PAIN DESCRIPTION - DIRECTION: RADIATING_TOWARDS: L ARM

## 2025-07-16 ASSESSMENT — PAIN DESCRIPTION - ONSET: ONSET: AWAKENED FROM SLEEP

## 2025-07-16 ASSESSMENT — PAIN - FUNCTIONAL ASSESSMENT: PAIN_FUNCTIONAL_ASSESSMENT: PREVENTS OR INTERFERES SOME ACTIVE ACTIVITIES AND ADLS

## 2025-07-16 NOTE — CARE COORDINATION
Case Management Assessment  Obs Initial Evaluation    Date/Time of Evaluation: 7/16/2025 4:24 PM  Assessment Completed by: ADAM ESTES RN    If patient is discharged prior to next notation, then this note serves as note for discharge by case management.    Patient Name: James Gabriel                   YOB: 1960  Diagnosis: Chest pain, unspecified [R07.9]  Elevated troponin [R79.89]  Chest pain, unspecified type [R07.9]                   Date / Time: 7/16/2025 10:48 AM    Patient Admission Status: Observation   Readmission Risk (Low < 19, Mod (19-27), High > 27): Readmission Risk Score: 16.9    Current PCP: Ishan Feliz MD  PCP verified by CM? Yes    Chart Reviewed:       History Provided by: Patient  Patient Orientation: Alert and Oriented    Patient Cognition: Alert    Hospitalization in the last 30 days (Readmission):      If yes, Readmission Assessment in CM Navigator will be completed.    Advance Directives:      Code Status: Full Code   Patient's Primary Decision Maker is:        Discharge Planning:    Patient lives with: Alone Type of Home: House  Primary Care Giver: Self  Patient Support Systems include: Family Members   Current Financial resources:    Current community resources:    Current services prior to admission: Durable Medical Equipment, Oxygen Therapy            Current DME:  (O2 2L , [employer])            Type of Home Care services:  None    ADLS  Prior functional level: Independent in ADLs/IADLs  Current functional level: Independent in ADLs/IADLs    PT AM-PAC:   /24  OT AM-PAC:   /24    Family can provide assistance at DC:    Would you like Case Management to discuss the discharge plan with any other family members/significant others, and if so, who?    Plans to Return to Present Housing:    Other Identified Issues/Barriers to RETURNING to current housing:   Potential Assistance needed at discharge: N/A            Potential DME:    Patient expects to discharge to:

## 2025-07-16 NOTE — ED NOTES
ED to inpatient nurses report      Chief Complaint:  Chief Complaint   Patient presents with    Chest Pain     Recent heart surgery at [facility]  Tricuspid valve \"was leaking\"     Present to ED from: home    MOA:     LOC: alert and orientated to name, place, date  Mobility: Independent  Oxygen Baseline: room air    Current needs required: room air   Pending ED orders:   Present condition: a.ox4      Why did the patient come to the ED? Pt to ED via triage with c/o chest pain that started last night. Pt c/o shortness of breath. Pt states recent valve replacement in March. Pt still has sutures in from March. States he is too depressed to leave his house, denies SI. States he has not taken his home medications in over a month due to not going to PCP. Pt is a/ox4, ambulatory, RR even and non labored on room air, call light in reach, attached to full cardiac monitor and continuous spo2.   What is the plan? Cardiology consult   Any procedures or intervention occur? Labs, xray   Any safety concerns??    Mental Status:       Psych Assessment:   Psychosocial  Psychosocial (WDL): (S) Exceptions to WDL (c/o depression)  Vital signs   Vitals:    07/16/25 1042 07/16/25 1215   BP: (!) 154/104 120/87   Pulse: 71 76   Resp: 16 19   Temp: 97.3 °F (36.3 °C)    SpO2: 98% 96%        Vitals:  Patient Vitals for the past 24 hrs:   BP Temp Pulse Resp SpO2   07/16/25 1215 120/87 -- 76 19 96 %   07/16/25 1042 (!) 154/104 97.3 °F (36.3 °C) 71 16 98 %      Visit Vitals  /87   Pulse 76   Temp 97.3 °F (36.3 °C)   Resp 19   SpO2 96%        LDAs:   Peripheral IV 07/16/25 Right Antecubital (Active)   Site Assessment Clean, dry & intact 07/16/25 1203   Line Status Blood return noted;Flushed;Normal saline locked;Specimen collected 07/16/25 1203   Phlebitis Assessment No symptoms 07/16/25 1203   Infiltration Assessment 0 07/16/25 1203   Dressing Status New dressing applied;Clean, dry & intact 07/16/25 1203   Dressing Type Transparent

## 2025-07-16 NOTE — ED NOTES
Pt to ED via triage with c/o chest pain that started last night. Pt c/o shortness of breath. Pt states recent valve replacement in March. Pt still has sutures in from March. States he is too depressed to leave his house, denies SI. States he has not taken his home medications in over a month due to not going to PCP. Pt is a/ox4, ambulatory, RR even and non labored on room air, call light in reach, attached to full cardiac monitor and continuous spo2.

## 2025-07-16 NOTE — PROGRESS NOTES
[facility]  CDU / OBSERVATION ENCOUNTER  ATTENDING NOTE         I performed a history and physical examination of the patient and discussed management with the resident or midlevel provider. I reviewed the resident or midlevel provider's note and agree with the documented findings and plan of care. Any areas of disagreement are noted on the chart. I was personally present for the key portions of any procedures. I have documented in the chart those procedures where I was not present during the key portions. I have reviewed the nurses notes. I agree with the chief complaint, past medical history, past surgical history, allergies, medications, social and family history as documented unless otherwise noted below.    The Family history, social history, and ROS are effectively unchanged since admission unless noted elsewhere in the chart.     This patient was placed in the observation unit for reevaluation for possible admission to the hospital     Patient has had workup already this year for coronary artery disease.  Patient does have elevation of troponin consistent with prior injury but this is much lower than it has been previously and the patient had cardiac catheterization showing nonobstructive coronary artery disease.  Patient currently has some discomfort.  Will treat symptomatically.  Patient admitted for ongoing evaluation by cardiology.  Patient without other difficulty.  No shortness of breath.  Ongoing chest tightness but no EKG changes.  Cardiac enzymes showin elevation from normal but well below previous baseline.  Will continue to monitor       Sly Johnson MD  Attending Emergency  Physician

## 2025-07-16 NOTE — H&P
[facility]  CDU / OBSERVATION ENCOUNTER  RESIDENT NOTE     Pt Name: James Gabriel  MRN: [id number]  Birthdate 1960  Date of evaluation: 7/16/25  Patient's PCP is :  Ishan Feliz MD    CHIEF COMPLAINT       Chief Complaint   Patient presents with    Chest Pain     Recent heart surgery at [facility]  Tricuspid valve \"was leaking\"         HISTORY OF PRESENT ILLNESS    James Gabriel is a 65 y.o. male who presents left sided chest pain radiating to his left arm. Patient has a history of tricuspid valve replacement in March 2025 at [facility].  Patient is also complaining of shortness of breath and left arm weakness that he believes is related to the chest pain.  On exam, patient is resting in bed.  He still endorses some mild left-sided chest pain and left arm weakness.  He currently denies shortness of breath, abdominal pain, nausea, vomiting, and fatigue.    Location/Symptom: left side chest pain  Timing/Onset: 1 day  Provocation: N/A  Quality: pressure  Radiation: left arm  Severity: 6/10  Timing/Duration: N/A  Modifying Factors: N/A  History was obtained in part through review of the ED chart. When possible, a direct discussion was had with ED nurses, residents, and attendings        REVIEW OF SYSTEMS       Review of Systems   Constitutional:  Negative for activity change, chills, diaphoresis and fatigue.   HENT:  Negative for congestion, facial swelling, sinus pressure and sinus pain.    Eyes: Negative.    Respiratory:  Positive for shortness of breath. Negative for cough.    Cardiovascular:  Positive for chest pain. Negative for leg swelling.        Left side chest pain   Gastrointestinal:  Negative for abdominal pain, constipation, diarrhea, nausea and vomiting.   Endocrine: Negative.    Genitourinary:  Negative for flank pain.   Musculoskeletal:  Positive for myalgias.   Neurological:  Positive for weakness. Negative for syncope and headaches.        Left arm

## 2025-07-16 NOTE — ED PROVIDER NOTES
chart was reviewed for pertinent history relating to the chief complaint.  The patient was evaluated at bedside.  The patient appears well at this time no acute distress, vitals are stable.  Plan to obtain CBC, BMP, serial troponin, and chest x-ray.  The patient was given oral aspirin and sublingual nitroglycerin.  I reviewed the patient's chart, cardiac catheterization done in February 19, 2025 reveals angiographically normal vessels, no interventions were performed at that time.  I also reviewed echocardiogram from 3/3/2025 and at that time the patient had severely decreased LV ejection fraction of 25 to 30%, RV was dilated, tricuspid valve with moderate regurg and severe regurgitation of the aortic valve.      DIAGNOSTIC RESULTS     EKG: All EKG's are interpreted by the Emergency Department Physician who either signs or Co-signs this chart in the absence of a cardiologist.    Twelve-lead EKG obtained reveals normal sinus rhythm, T wave inversions noted in the lateral leads that were present on previous EKG, no other specific signs of acute ischemia, left ventricular hypertrophy, left axis deviation.    RADIOLOGY:   I directly visualized the following  images and reviewed the radiologist interpretations:  XR CHEST PORTABLE   Final Result   No acute cardiopulmonary findings              LABS:  Labs Reviewed   BASIC METABOLIC PANEL - Abnormal; Notable for the following components:       Result Value    Potassium 3.2 (*)     Glucose 125 (*)     BUN 6 (*)     All other components within normal limits   CBC WITH AUTO DIFFERENTIAL - Abnormal; Notable for the following components:    Hematocrit 39.6 (*)     MCHC 35.1 (*)     RDW 17.0 (*)     Monocytes % 13 (*)     All other components within normal limits   TROPONIN - Abnormal; Notable for the following components:    Troponin, High Sensitivity 60 (*)     All other components within normal limits   TROPONIN - Abnormal; Notable for the following components:    Troponin,  High Sensitivity 53 (*)     All other components within normal limits         EMERGENCY DEPARTMENT COURSE:   Vitals:    Vitals:    07/16/25 1042 07/16/25 1215 07/16/25 1300   BP: (!) 154/104 120/87    Pulse: 71 76    Resp: 16 19    Temp: 97.3 °F (36.3 °C)     SpO2: 98% 96%    Weight:   65.5 kg (144 lb 6.4 oz)     -------------------------  BP: 120/87, Temp: 97.3 °F (36.3 °C), Pulse: 76, Respirations: 19    Labs are significant for elevated troponin, initial troponin was 60, second was 53.  The patient has improved symptoms, troponin is similar to previous elevated troponin.  On reevaluation the patient reports that he does not have any of his home medications.  Given that the patient has significant cardiac history and none of his medications he will be placed in observation to initiate his home medications, have cardiology evaluation, and further monitor with telemetry.  The patient has a heart score of 5, although he had recent negative catheterization. The patient demonstrated general understanding and was agreeable    CONSULTS:  IP CONSULT TO CARDIOLOGY        PROCEDURES:  None    FINAL IMPRESSION      1. Chest pain, unspecified type    2. Elevated troponin          DISPOSITION/PLAN   DISPOSITION Admitted 07/16/2025 01:02:56 PM               PATIENT REFERRED TO:  No follow-up provider specified.    DISCHARGE MEDICATIONS:  New Prescriptions    No medications on file       (Please note that portions of this note were completed with a voice recognition program.  Efforts were made to edit the dictations but occasionally words are mis-transcribed.)    Andrea Perez DO  Attending Emergency Physician                   Andrea Perez DO  07/16/25 3178

## 2025-07-17 ENCOUNTER — APPOINTMENT (OUTPATIENT)
Age: 65
DRG: 291 | End: 2025-07-17
Attending: STUDENT IN AN ORGANIZED HEALTH CARE EDUCATION/TRAINING PROGRAM
Payer: MEDICARE

## 2025-07-17 PROBLEM — Z98.890 S/P TVR (TRICUSPID VALVE REPAIR): Status: ACTIVE | Noted: 2025-07-17

## 2025-07-17 PROBLEM — I50.9 CONGESTIVE HEART FAILURE WITH CARDIOMYOPATHY (HCC): Status: ACTIVE | Noted: 2025-07-17

## 2025-07-17 PROBLEM — Z95.2 S/P AVR (AORTIC VALVE REPLACEMENT): Status: ACTIVE | Noted: 2025-07-17

## 2025-07-17 PROBLEM — I42.8 NON-ISCHEMIC CARDIOMYOPATHY (HCC): Status: ACTIVE | Noted: 2025-07-17

## 2025-07-17 PROBLEM — I10 HYPERTENSION: Status: ACTIVE | Noted: 2025-07-17

## 2025-07-17 PROBLEM — I42.9 CONGESTIVE HEART FAILURE WITH CARDIOMYOPATHY (HCC): Status: ACTIVE | Noted: 2025-07-17

## 2025-07-17 LAB
ECHO AV AREA PEAK VELOCITY: 1.3 CM2
ECHO AV AREA VTI: 1.1 CM2
ECHO AV AREA/BSA PEAK VELOCITY: 0.7 CM2/M2
ECHO AV AREA/BSA VTI: 0.6 CM2/M2
ECHO AV MEAN GRADIENT: 9 MMHG
ECHO AV MEAN VELOCITY: 1.4 M/S
ECHO AV PEAK GRADIENT: 15 MMHG
ECHO AV PEAK VELOCITY: 2 M/S
ECHO AV VELOCITY RATIO: 0.4
ECHO AV VTI: 37.5 CM
ECHO EST RA PRESSURE: 3 MMHG
ECHO LV EDV A2C: 144 ML
ECHO LV EDV A4C: 136 ML
ECHO LV EDV INDEX A4C: 76 ML/M2
ECHO LV EDV NDEX A2C: 80 ML/M2
ECHO LV EF PHYSICIAN: 10 %
ECHO LV EJECTION FRACTION A2C: 8 %
ECHO LV EJECTION FRACTION A4C: 15 %
ECHO LV EJECTION FRACTION BIPLANE: 14 % (ref 55–100)
ECHO LV ESV A2C: 133 ML
ECHO LV ESV A4C: 116 ML
ECHO LV ESV INDEX A2C: 74 ML/M2
ECHO LV ESV INDEX A4C: 65 ML/M2
ECHO LV INTERNAL DIMENSION DIASTOLE INDEX: 3.46 CM/M2
ECHO LV INTERNAL DIMENSION DIASTOLIC: 6.2 CM (ref 4.2–5.9)
ECHO LVOT AREA: 3.1 CM2
ECHO LVOT AV VTI INDEX: 0.36
ECHO LVOT DIAM: 2 CM
ECHO LVOT MEAN GRADIENT: 1 MMHG
ECHO LVOT PEAK GRADIENT: 3 MMHG
ECHO LVOT PEAK VELOCITY: 0.8 M/S
ECHO LVOT STROKE VOLUME INDEX: 23.5 ML/M2
ECHO LVOT SV: 42.1 ML
ECHO LVOT VTI: 13.4 CM
EKG ATRIAL RATE: 67 BPM
EKG P AXIS: 56 DEGREES
EKG P-R INTERVAL: 196 MS
EKG Q-T INTERVAL: 430 MS
EKG QRS DURATION: 98 MS
EKG QTC CALCULATION (BAZETT): 454 MS
EKG R AXIS: -32 DEGREES
EKG T AXIS: 248 DEGREES
EKG VENTRICULAR RATE: 67 BPM

## 2025-07-17 PROCEDURE — 6370000000 HC RX 637 (ALT 250 FOR IP): Performed by: STUDENT IN AN ORGANIZED HEALTH CARE EDUCATION/TRAINING PROGRAM

## 2025-07-17 PROCEDURE — 93321 DOPPLER ECHO F-UP/LMTD STD: CPT

## 2025-07-17 PROCEDURE — 2500000003 HC RX 250 WO HCPCS: Performed by: LICENSED PRACTICAL NURSE

## 2025-07-17 PROCEDURE — 6360000002 HC RX W HCPCS: Performed by: LICENSED PRACTICAL NURSE

## 2025-07-17 PROCEDURE — 1200000000 HC SEMI PRIVATE

## 2025-07-17 PROCEDURE — 96372 THER/PROPH/DIAG INJ SC/IM: CPT

## 2025-07-17 PROCEDURE — 6370000000 HC RX 637 (ALT 250 FOR IP): Performed by: EMERGENCY MEDICINE

## 2025-07-17 PROCEDURE — 6360000004 HC RX CONTRAST MEDICATION: Performed by: STUDENT IN AN ORGANIZED HEALTH CARE EDUCATION/TRAINING PROGRAM

## 2025-07-17 PROCEDURE — 99223 1ST HOSP IP/OBS HIGH 75: CPT | Performed by: STUDENT IN AN ORGANIZED HEALTH CARE EDUCATION/TRAINING PROGRAM

## 2025-07-17 PROCEDURE — 6370000000 HC RX 637 (ALT 250 FOR IP): Performed by: LICENSED PRACTICAL NURSE

## 2025-07-17 RX ADMIN — EMPAGLIFLOZIN 10 MG: 10 TABLET, FILM COATED ORAL at 14:59

## 2025-07-17 RX ADMIN — SERTRALINE 50 MG: 50 TABLET, FILM COATED ORAL at 08:10

## 2025-07-17 RX ADMIN — ENOXAPARIN SODIUM 40 MG: 100 INJECTION SUBCUTANEOUS at 08:13

## 2025-07-17 RX ADMIN — DIVALPROEX SODIUM 500 MG: 500 TABLET, DELAYED RELEASE ORAL at 08:11

## 2025-07-17 RX ADMIN — TRAZODONE HYDROCHLORIDE 100 MG: 50 TABLET ORAL at 20:38

## 2025-07-17 RX ADMIN — SODIUM CHLORIDE, PRESERVATIVE FREE 10 ML: 5 INJECTION INTRAVENOUS at 08:18

## 2025-07-17 RX ADMIN — OXYCODONE 5 MG: 5 TABLET ORAL at 20:38

## 2025-07-17 RX ADMIN — SODIUM CHLORIDE, PRESERVATIVE FREE 10 ML: 5 INJECTION INTRAVENOUS at 20:40

## 2025-07-17 RX ADMIN — METOPROLOL SUCCINATE 12.5 MG: 25 TABLET, EXTENDED RELEASE ORAL at 08:12

## 2025-07-17 RX ADMIN — LISINOPRIL 10 MG: 10 TABLET ORAL at 08:10

## 2025-07-17 RX ADMIN — SULFUR HEXAFLUORIDE 2 ML: KIT at 16:35

## 2025-07-17 RX ADMIN — DIVALPROEX SODIUM 500 MG: 500 TABLET, DELAYED RELEASE ORAL at 20:38

## 2025-07-17 RX ADMIN — TAMSULOSIN HYDROCHLORIDE 0.4 MG: 0.4 CAPSULE ORAL at 08:10

## 2025-07-17 RX ADMIN — SPIRONOLACTONE 12.5 MG: 25 TABLET, FILM COATED ORAL at 08:10

## 2025-07-17 RX ADMIN — QUETIAPINE FUMARATE 150 MG: 50 TABLET, EXTENDED RELEASE ORAL at 08:09

## 2025-07-17 ASSESSMENT — PAIN DESCRIPTION - ORIENTATION: ORIENTATION: LEFT

## 2025-07-17 ASSESSMENT — PAIN DESCRIPTION - LOCATION
LOCATION: CHEST

## 2025-07-17 ASSESSMENT — PAIN SCALES - GENERAL
PAINLEVEL_OUTOF10: 7
PAINLEVEL_OUTOF10: 7

## 2025-07-17 ASSESSMENT — PAIN DESCRIPTION - DESCRIPTORS: DESCRIPTORS: PRESSURE;SHARP

## 2025-07-17 NOTE — CONSULTS
[facility]    Consult / H&P               Today's Date: 7/17/2025  Patient Name: James Gabriel  Date of admission: 7/16/2025 10:48 AM  Patient's age: 65 y.o., 1960  Admission Dx: Chest pain, unspecified [R07.9]  Elevated troponin [R79.89]  Chest pain, unspecified type [R07.9]    Reason for Consult: Chest pain    Requesting Physician: Sly Johnson MD    CHIEF COMPLAINT: Chest pain history of recent tricuspid valve replacement in March 2025    History Obtained From:  patient, electronic medical record    HISTORY OF PRESENT ILLNESS:      The patient is a 65 y.o. male with past medical history of COPD, CKD, tricuspid valve replacement in March 2025 ([facility]), bipolar disorder type I with depression, benign prostatic hyperplasia who presents with left-sided 6 out of 10 chest pain radiating to his left arm with no aggravating factors and relieved on sublingual nitro and oral aspirin in the ED. The patient says that his left arm was also weak and is also short of breath on minimal exertion at the time of the episode.    The patient states that he has not taking his home medications for over a month as he did not go to his PCP for his refills.    The EKG on admission showed normal sinus rhythm, T wave inversions in the lateral leads that were present on the previous EKG, left ventricular hypertrophy, left axis deviation.    Patient is A&O x 4 and is resting in his bed.  He states that he feels much better from when he first came in.  Patient has still has some mild chest discomfort on moving around and fatigue.  He denies any shortness of breath, palpitations, diaphoresis, lightheadedness or syncope episodes.  The patient has no abdominal pain, nausea, vomiting, diarrhea.    The patient has no headache, fever, chills, night sweats.    Past Medical History:   has a past medical history of Chronic kidney disease, Degenerative disk disease, Depression, Hematuria, Paranoia (HCC), and  Headaches  Cardiovascular: Tricuspid valve replacement at [facility] in March 2025.  Patient presented with chest pain and shortness of breath to the ED.  Currently has only mild chest discomfort.  Respiratory: History of COPD with episodes of exacerbations in the past, No cough  Gastrointestinal: No abdominal pain.  No change in bowel or bladder habits.  Genitourinary: No dysuria, trouble voiding, or hematuria.  Musculoskeletal:  No gait disturbance, No weakness or joint complaints.  Integumentary: No rash or pruritis.  Neurological: No headache, diplopia, change in muscle strength, numbness or tingling. No change in gait, balance, coordination, mood, affect, memory, mentation, behavior.  Psychiatric: Depression with no suicidal ideation, anxiety.  Endocrine: No temperature intolerance. No excessive thirst, fluid intake, or urination. No tremor.  Hematologic/Lymphatic: No abnormal bruising or bleeding, blood clots or swollen lymph nodes.  Allergic/Immunologic: No nasal congestion or hives.      PHYSICAL EXAM:      BP (!) 117/96   Pulse 70   Temp 97.7 °F (36.5 °C) (Oral)   Resp 18   Ht 1.74 m (5' 8.5\")   Wt 65.5 kg (144 lb 6.4 oz)   SpO2 97%   BMI 21.63 kg/m²    Constitutional and General Appearance: The patient is little apprehensive and apparently denied any vital checks or medications last night and today morning (as per the nurse) and appears stated age.  The patient appears fatigued.  HEENT: PERRL, no cervical lymphadenopathy. No masses palpable. Normal oral mucosa  Respiratory:  Normal respiratory effort.  Not in respiratory distress.  Bilateral equal air entry.  Lungs are clear on auscultation.  No added sounds.  Cardiovascular:  Regular pulse with a few PVCs.  Normal regular S1-S2 heard.  No murmurs heard.  Jugular venous pulsation Normal  No carotid bruit heard  Peripheral pulses are symmetrical and full   Abdomen:   Has two incisions with sutures(from the Minimally invasive Tricuspid valve

## 2025-07-17 NOTE — PROGRESS NOTES
Patient refused treatment, including vital signs and medications. Patient stated, “I don’t want any meds. Get out of my room,” and used profane language toward staff. Nurse attempted to educate patient on the importance of treatment, but patient became more agitated, stating he was tired, wanted to sleep, and questioned, “Why are you always waking me up?” Patient again requested the nurse leave him to sleep. No further attempts made at this time per patient’s request. Provider notified of patient’s refusal and behavior. Will continue to monitor and re-approach as appropriate.

## 2025-07-17 NOTE — FLOWSHEET NOTE
Pt refused vitals at start of shift; said he wanted to be left alone, needed to sleep. Made a second attempt and pt refused again. Nurse notified.

## 2025-07-17 NOTE — PROGRESS NOTES
Congestive Heart Failure Education completed and charted. CHF booklet given. Patient was receptive to education.    Discussed the  importance of medication compliance.    Discussed the importance of a heart healthy diet. Discussed 2000 mg sodium-restricted daily diet.  Patient instructed to limit fluid intake to  1.5 to 2 liters per day.    Patient instructed to weigh self at the same time of each day each morning, reinforced teaching to monitor for 3-5 lb weight increase over 1-2 days notify physician if change noted.      Signs and symptoms of CHF discussed with patient, such as feeling more tired than normal, feeling short of breath, coughing that increases when lying down, sudden weight gain, swelling of the feet, legs or belly.  Patient verbalized understanding to notify physician office if these symptoms occur.  EF 25-30%  Pt is agreeable to an outpatient CHF Clinic referral . Referral placed.

## 2025-07-17 NOTE — PROGRESS NOTES
OBS/CDU   Progress NOTE      Patients PCP is:  Ishan Feliz MD        SUBJECTIVE      ***    PHYSICAL EXAM      General: NAD, AO X 3  Heent: EOMI, PERRL  Neck: SUPPLE, NO JVD  Cardiovascular: RRR, S1S2  Pulmonary: CTAB, NO SOB  Abdomen: SOFT, NTTP, ND, +BS  Extremities: +2/4 PULSES DISTAL, NO SWELLING  Neuro / Psych: NO NUMBNESS OR TINGLING, MENTATION AT BASELINE    PERTINENT TEST /EXAMS      I have reviewed all available laboratory results.    MEDICATIONS CURRENT   empagliflozin (JARDIANCE) tablet 10 mg, Daily  nitroGLYCERIN (NITROSTAT) SL tablet 0.4 mg, PRN  sodium chloride flush 0.9 % injection 5-40 mL, 2 times per day  sodium chloride flush 0.9 % injection 5-40 mL, PRN  0.9 % sodium chloride infusion, PRN  acetaminophen (TYLENOL) tablet 650 mg, Q6H PRN   Or  acetaminophen (TYLENOL) suppository 650 mg, Q6H PRN  enoxaparin (LOVENOX) injection 40 mg, Daily  albuterol sulfate HFA (PROVENTIL;VENTOLIN;PROAIR) 108 (90 Base) MCG/ACT inhaler 2 puff, Q4H PRN  lisinopril (PRINIVIL;ZESTRIL) tablet 10 mg, Daily  metoprolol succinate (TOPROL XL) extended release tablet 12.5 mg, Daily  QUEtiapine (SEROQUEL XR) extended release tablet 150 mg, Daily  sertraline (ZOLOFT) tablet 50 mg, Daily  spironolactone (ALDACTONE) tablet 12.5 mg, Daily  tamsulosin (FLOMAX) capsule 0.4 mg, Daily  traZODone (DESYREL) tablet 100 mg, Nightly  divalproex (DEPAKOTE) DR tablet 500 mg, 2 times per day  ketorolac (TORADOL) injection 15 mg, Q6H PRN  oxyCODONE (ROXICODONE) immediate release tablet 5 mg, Q4H PRN        All medication charted and reviewed.    CONSULTS      IP CONSULT TO CARDIOLOGY    ASSESSMENT/PLAN       James Gabriel is a 65 y.o. male who presents with ***     ***  Continue home medications and pain control  Monitor vitals, labs, and imaging  DISPO: pending consults and clinical improvement    --  Rob Elizabeth MD  Observation Physician     This dictation was generated by voice recognition computer software.  Although

## 2025-07-17 NOTE — PLAN OF CARE
Problem: Discharge Planning  Goal: Discharge to home or other facility with appropriate resources  7/17/2025 0849 by Nini Parada GN  Outcome: Progressing  7/17/2025 0508 by Tito Finn, RN  Outcome: Progressing     Problem: Pain  Goal: Verbalizes/displays adequate comfort level or baseline comfort level  7/17/2025 0849 by Nini Parada GN  Outcome: Progressing  7/17/2025 0508 by Tito Finn, RN  Outcome: Progressing     Problem: Cardiovascular - Adult  Goal: Maintains optimal cardiac output and hemodynamic stability  7/17/2025 0849 by Nini Parada GN  Outcome: Progressing  7/17/2025 0508 by Tito Finn, RN  Outcome: Progressing  Goal: Absence of cardiac dysrhythmias or at baseline  7/17/2025 0849 by Nini Parada GN  Outcome: Progressing  7/17/2025 0508 by Tito Finn, RN  Outcome: Progressing

## 2025-07-17 NOTE — PROGRESS NOTES
Writer knocked on pt's room and entered room to where pt immediately started yelling at writer stating that writer \"needs to come back and we need to let him get some damn sleep.\" Writer informed pt that it is important to obtain vitals for the doctors when they come to see him and to make sure he is doing okay. Pt got very aggravated stating he wants to speak to a supervisor and got out of bed quickly with arms swinging and charging at writer. Writer backed out of room and pt waived his hands in the air demanding a supervisor and being verbally aggressive. RN notified

## 2025-07-18 VITALS
RESPIRATION RATE: 16 BRPM | HEART RATE: 68 BPM | BODY MASS INDEX: 21.39 KG/M2 | WEIGHT: 144.4 LBS | DIASTOLIC BLOOD PRESSURE: 102 MMHG | TEMPERATURE: 97.5 F | HEIGHT: 69 IN | SYSTOLIC BLOOD PRESSURE: 123 MMHG | OXYGEN SATURATION: 95 %

## 2025-07-18 PROBLEM — R07.9 CHEST PAIN, UNSPECIFIED: Status: RESOLVED | Noted: 2025-07-16 | Resolved: 2025-07-18

## 2025-07-18 PROBLEM — Z98.890 S/P TVR (TRICUSPID VALVE REPAIR): Status: RESOLVED | Noted: 2025-07-17 | Resolved: 2025-07-18

## 2025-07-18 PROBLEM — I10 HYPERTENSION: Status: RESOLVED | Noted: 2025-07-17 | Resolved: 2025-07-18

## 2025-07-18 PROCEDURE — 6360000002 HC RX W HCPCS: Performed by: LICENSED PRACTICAL NURSE

## 2025-07-18 PROCEDURE — 6360000002 HC RX W HCPCS: Performed by: EMERGENCY MEDICINE

## 2025-07-18 PROCEDURE — 6370000000 HC RX 637 (ALT 250 FOR IP): Performed by: EMERGENCY MEDICINE

## 2025-07-18 PROCEDURE — 6370000000 HC RX 637 (ALT 250 FOR IP): Performed by: LICENSED PRACTICAL NURSE

## 2025-07-18 PROCEDURE — 6370000000 HC RX 637 (ALT 250 FOR IP): Performed by: STUDENT IN AN ORGANIZED HEALTH CARE EDUCATION/TRAINING PROGRAM

## 2025-07-18 PROCEDURE — 2500000003 HC RX 250 WO HCPCS: Performed by: LICENSED PRACTICAL NURSE

## 2025-07-18 PROCEDURE — 99233 SBSQ HOSP IP/OBS HIGH 50: CPT | Performed by: NURSE PRACTITIONER

## 2025-07-18 PROCEDURE — 6370000000 HC RX 637 (ALT 250 FOR IP): Performed by: NURSE PRACTITIONER

## 2025-07-18 RX ORDER — FUROSEMIDE 20 MG/1
20 TABLET ORAL DAILY
Status: DISCONTINUED | OUTPATIENT
Start: 2025-07-18 | End: 2025-07-18 | Stop reason: HOSPADM

## 2025-07-18 RX ADMIN — KETOROLAC TROMETHAMINE 15 MG: 15 INJECTION, SOLUTION INTRAMUSCULAR; INTRAVENOUS at 08:37

## 2025-07-18 RX ADMIN — EMPAGLIFLOZIN 10 MG: 10 TABLET, FILM COATED ORAL at 08:41

## 2025-07-18 RX ADMIN — FUROSEMIDE 20 MG: 20 TABLET ORAL at 12:16

## 2025-07-18 RX ADMIN — QUETIAPINE FUMARATE 150 MG: 50 TABLET, EXTENDED RELEASE ORAL at 08:40

## 2025-07-18 RX ADMIN — KETOROLAC TROMETHAMINE 15 MG: 15 INJECTION, SOLUTION INTRAMUSCULAR; INTRAVENOUS at 00:05

## 2025-07-18 RX ADMIN — SPIRONOLACTONE 12.5 MG: 25 TABLET, FILM COATED ORAL at 08:41

## 2025-07-18 RX ADMIN — OXYCODONE 5 MG: 5 TABLET ORAL at 02:55

## 2025-07-18 RX ADMIN — TAMSULOSIN HYDROCHLORIDE 0.4 MG: 0.4 CAPSULE ORAL at 08:41

## 2025-07-18 RX ADMIN — SERTRALINE 50 MG: 50 TABLET, FILM COATED ORAL at 08:41

## 2025-07-18 RX ADMIN — LISINOPRIL 10 MG: 10 TABLET ORAL at 08:41

## 2025-07-18 RX ADMIN — SODIUM CHLORIDE, PRESERVATIVE FREE 10 ML: 5 INJECTION INTRAVENOUS at 08:48

## 2025-07-18 RX ADMIN — METOPROLOL SUCCINATE 12.5 MG: 25 TABLET, EXTENDED RELEASE ORAL at 08:40

## 2025-07-18 RX ADMIN — DIVALPROEX SODIUM 500 MG: 500 TABLET, DELAYED RELEASE ORAL at 08:40

## 2025-07-18 RX ADMIN — ENOXAPARIN SODIUM 40 MG: 100 INJECTION SUBCUTANEOUS at 08:40

## 2025-07-18 ASSESSMENT — PAIN SCALES - GENERAL
PAINLEVEL_OUTOF10: 8
PAINLEVEL_OUTOF10: 7
PAINLEVEL_OUTOF10: 5
PAINLEVEL_OUTOF10: 8
PAINLEVEL_OUTOF10: 5

## 2025-07-18 ASSESSMENT — PAIN DESCRIPTION - LOCATION
LOCATION: CHEST
LOCATION: CHEST

## 2025-07-18 ASSESSMENT — PAIN DESCRIPTION - DESCRIPTORS: DESCRIPTORS: PRESSURE

## 2025-07-18 ASSESSMENT — PAIN DESCRIPTION - ORIENTATION: ORIENTATION: LEFT

## 2025-07-18 NOTE — PROGRESS NOTES
CLINICAL PHARMACY NOTE: MEDS TO BEDS    Total # of Prescriptions Filled: 1   The following medications were delivered to the patient:  Jardiance 10mg    Additional Documentation: delivered to patient in room 331 7/18 at 1:56pm. No co-pay.

## 2025-07-18 NOTE — DISCHARGE INSTR - COC
Continuity of Care Form    Patient Name: James Gabriel   :  1960  MRN:  [id number]    Admit date:  2025  Discharge date:  ***    Code Status Order: Full Code   Advance Directives:     Admitting Physician:  Sly Johnson MD  PCP: Ishan Feliz MD    Discharging Nurse: ***  Discharging Hospital Unit/Room#: 0331/0331-01  Discharging Unit Phone Number: ***    Emergency Contact:   Extended Emergency Contact Information  Primary Emergency Contact: Myranda Gabriel  Address: [address] [place] [address]  Home Phone: [phone]  Relation: Ex-Spouse    Past Surgical History:  Past Surgical History:   Procedure Laterality Date    CYSTOSCOPY  12    Rt steinstrasse    HERNIA REPAIR  3/2010    LITHOTRIPSY  12    Rt holmium laser     URETEROSCOPY      Rt       Immunization History:   Immunization History   Administered Date(s) Administered    COVID-19, J&J, (age 18y+), IM, 0.5 mL 2021       Active Problems:  Patient Active Problem List   Diagnosis Code    Degenerative disk disease [id number]    Kidney stones N20.0    Hepatitis C infection B19.20    Hypokalemia E87.6    Dysmetria R27.8    Hep C w/o coma, chronic ([facility]) B18.2    Schizophrenia ([facility]) F20.9    Bipolar 1 disorder, depressed ([facility]) F31.9    Essential hypertension I10    Pneumonia due to infectious organism J18.9    Congestive heart failure (HCC) I50.9    Nonischemic cardiomyopathy ([facility]) I42.9    Acute on chronic systolic CHF (congestive heart failure) ([facility]) I50.23    Nonrheumatic aortic valve insufficiency I35.1    Shortness of breath R06.02    Acute hypoxic respiratory failure ([facility]) J96.01    COPD exacerbation ([facility]) J44.1    Coronavirus infection B34.2    Chest pain, unspecified R07.9    S/P TVR (tricuspid valve repair) Z98.890    S/P AVR (aortic valve replacement) Z95.2    Hypertension I10    Non-ischemic cardiomyopathy (HCC) I42.8    Congestive heart failure with cardiomyopathy ([facility]) I50.9, I42.9

## 2025-07-18 NOTE — PROGRESS NOTES
[facility]   Progress Note                   Date:   2025  Patient name: James Gabriel  Date of admission:  2025 10:48 AM  MRN:   [id number]  YOB: 1960  PCP: Ishan Feliz MD    Reason for Admission: Chest pain, unspecified [R07.9]  Elevated troponin [R79.89]  Chest pain, unspecified type [R07.9]  Congestive heart failure with cardiomyopathy (HCC) [I50.9, I42.9]    Subjective:       Clinical Changes / Abnormalities:Pt seen and examined in the room.  Pt denies any CP or sob.  Labs, vitals and tele reviewed-  Pt reports that he has not been taking his medications     No I/O recorded   Medications:   Scheduled Meds:   empagliflozin  10 mg Oral Daily    sodium chloride flush  5-40 mL IntraVENous 2 times per day    enoxaparin  40 mg SubCUTAneous Daily    lisinopril  10 mg Oral Daily    metoprolol succinate  12.5 mg Oral Daily    QUEtiapine  150 mg Oral Daily    sertraline  50 mg Oral Daily    spironolactone  12.5 mg Oral Daily    tamsulosin  0.4 mg Oral Daily    traZODone  100 mg Oral Nightly    divalproex  500 mg Oral 2 times per day     Continuous Infusions:   sodium chloride       CBC:   Recent Labs     25  1105   WBC 3.7   HGB 13.9        BMP:    Recent Labs     25  1105      K 3.2*      CO2 29   BUN 6*   CREATININE 0.8   GLUCOSE 125*     Hepatic: No results for input(s): \"AST\", \"ALT\", \"BILITOT\", \"ALKPHOS\" in the last 72 hours.    Invalid input(s): \"ALB\"  Troponin:   Recent Labs     25  1105 25  1201   TROPHS 60* 53*     BNP: No results for input(s): \"BNP\" in the last 72 hours.  Lipids: No results for input(s): \"CHOL\", \"HDL\" in the last 72 hours.    Invalid input(s): \"LDLCALCU\"  INR: No results for input(s): \"INR\" in the last 72 hours.    EK2025  3/11/2025: 106 bpm, sinus tachycardia with premature ventricular complexes.  Left anterior fascicular block.  Left atrial enlargement.  Left ventricular hypertrophy.  T wave

## 2025-07-18 NOTE — PROGRESS NOTES
OBS/CDU   Progress NOTE      Patients PCP is:  Ishan Feliz MD        SUBJECTIVE      Symptoms improving today but patient frustrated over slow workup.  Unable to perform studies sooner secondary to patient volumes in the hospital.  Patient was taken for ultimate cardiac echo later this afternoon with preliminary report available suggesting decreased EF and need for LifeVest.    PHYSICAL EXAM      General: NAD, AO X 3  Heent: EOMI, PERRL  Neck: SUPPLE, NO JVD  Cardiovascular: RRR, S1S2  Pulmonary: CTAB, NO SOB  Abdomen: SOFT, NTTP, ND, +BS  Extremities: +2/4 PULSES DISTAL, NO SWELLING  Neuro / Psych: NO NUMBNESS OR TINGLING, MENTATION AT BASELINE    PERTINENT TEST /EXAMS      I have reviewed all available laboratory results.    MEDICATIONS CURRENT   empagliflozin (JARDIANCE) tablet 10 mg, Daily  nitroGLYCERIN (NITROSTAT) SL tablet 0.4 mg, PRN  sodium chloride flush 0.9 % injection 5-40 mL, 2 times per day  sodium chloride flush 0.9 % injection 5-40 mL, PRN  0.9 % sodium chloride infusion, PRN  acetaminophen (TYLENOL) tablet 650 mg, Q6H PRN   Or  acetaminophen (TYLENOL) suppository 650 mg, Q6H PRN  enoxaparin (LOVENOX) injection 40 mg, Daily  albuterol sulfate HFA (PROVENTIL;VENTOLIN;PROAIR) 108 (90 Base) MCG/ACT inhaler 2 puff, Q4H PRN  lisinopril (PRINIVIL;ZESTRIL) tablet 10 mg, Daily  metoprolol succinate (TOPROL XL) extended release tablet 12.5 mg, Daily  QUEtiapine (SEROQUEL XR) extended release tablet 150 mg, Daily  sertraline (ZOLOFT) tablet 50 mg, Daily  spironolactone (ALDACTONE) tablet 12.5 mg, Daily  tamsulosin (FLOMAX) capsule 0.4 mg, Daily  traZODone (DESYREL) tablet 100 mg, Nightly  divalproex (DEPAKOTE) DR tablet 500 mg, 2 times per day  ketorolac (TORADOL) injection 15 mg, Q6H PRN  oxyCODONE (ROXICODONE) immediate release tablet 5 mg, Q4H PRN        All medication charted and reviewed.    CONSULTS      IP CONSULT TO CARDIOLOGY    ASSESSMENT/PLAN       James JILLIAN Gabriel is a 65 y.o. male who  presents with       Chest pain, currently resolved  Patient evaluated by cardiology.  Appreciate input  Patient with known cardiac disease.  For further evaluation with echocardiogram  Delay in testing secondary to patient volume blood preliminary report available and states that patient would likely need LifeVest.  Discussed via [employer] with cardiology fellow.  Patient For reevaluation  Ongoing monitoring necessary.  Patient meeting admission criteria secondary to comorbidities.  Need for closer monitoring.  Will reassess as patient has possible need for AICD.  Continue home medications and pain control  Monitor vitals, labs, and imaging  DISPO: pending consults and clinical improvement    --  Sly Johnson MD  Observation Physician     This dictation was generated by voice recognition computer software.  Although all attempts are made to edit the dictation for accuracy, there may be errors in the transcription that are not intended.

## 2025-07-18 NOTE — CARE COORDINATION
Case Management   Daily Progress Note       Patient Name: James Gabriel                   YOB: 1960  Diagnosis: Chest pain, unspecified [R07.9]  Elevated troponin [R79.89]  Chest pain, unspecified type [R07.9]  Congestive heart failure with cardiomyopathy (HCC) [I50.9, I42.9]                       GMLOS: 1.7 days  Length of Stay: 1  days    Anticipated Discharge Date: One day until discharge    Readmission Risk (Low < 19, Mod (19-27), High > 27): Readmission Risk Score: 13.9        Current Transitional Plan    [x] Home Independently    [] Home with HC    [] Skilled Nursing Facility    [] Acute Rehabilitation    [] Long Term Acute Care (LTAC)    [] Other:     Plan for the Stay (Medical Management) :  Need life vest   Add jardiance         Workflow Continuation (Additional Notes) :  Sent [employer] script and documentation for Life Vest.  Awaiting call from them to confirm delivery     UPdated Karen with Life Vest [phone]      Gracy Graham RN  July 18, 2025

## 2025-07-18 NOTE — PROGRESS NOTES
[facility]  CDU / OBSERVATION ENCOUNTER  ATTENDING NOTE       I performed a history and physical examination of the patient and discussed management with the resident or midlevel provider. I reviewed the resident or midlevel provider's note and agree with the documented findings and plan of care. Any areas of disagreement are noted on the chart. I was personally present for the key portions of any procedures. I have documented in the chart those procedures where I was not present during the key portions. I have reviewed the nurses notes. I agree with the chief complaint, past medical history, past surgical history, allergies, medications, social and family history as documented unless otherwise noted below.    The Family history, social history, and ROS are effectively unchanged since admission unless noted elsewhere in the chart.    This patient was placed in the observation unit for reevaluation for possible admission to the hospital    The patient is a 65 year old male who presented initially with chest pain. His chest pain has resolved but cardiac echo yesterday shows a EF of 5-10%. Cardiology suggested he will need a life vest vs AICD. Awaiting further recommendations from cardiology.     Cardiology plans on fitting the patient with a life vest. Plan for discharge with outpatient follow up after life vest is placed.    Teresa Howe DO, FACEP  Attending Emergency  Physician

## 2025-07-18 NOTE — PROGRESS NOTES
Report called to Yarely on 3B with good understanding.  All questions answered.  Patient transferred to Room 331 with belongings.

## 2025-07-18 NOTE — PLAN OF CARE
Problem: Discharge Planning  Goal: Discharge to home or other facility with appropriate resources  7/18/2025 1325 by Ronak Jimenez RN  Outcome: Progressing  7/18/2025 0414 by Yarely Lopez RN  Outcome: Progressing     Problem: Pain  Goal: Verbalizes/displays adequate comfort level or baseline comfort level  7/18/2025 1325 by Ronak Jimenez RN  Outcome: Progressing  7/18/2025 0414 by Yarely Lopez RN  Outcome: Progressing     Problem: Safety - Adult  Goal: Free from fall injury  7/18/2025 1325 by Ronak Jimenez RN  Outcome: Progressing  7/18/2025 0414 by Yarely Lopez RN  Outcome: Progressing

## 2025-07-18 NOTE — DISCHARGE INSTRUCTIONS
You have a history of heart failure and were admitted for chest pain and shortness of breath.  Cardiology was involved in your care.  Ultrasound of your heart showed reduced ejection fraction and LifeVest has been prescribed.  Please wear it as prescribed.  New medication Jardiance was added.  Please take all of your medications as prescribed.    Please follow-up with heart failure clinic.   Please follow-up with your primary care provider by calling for an appointment.   Return to the emergency department if you have chest pain, shortness of breath or any other concern.

## 2025-07-18 NOTE — DISCHARGE SUMMARY
CDU Discharge Summary        Patient:  James Gabriel  YOB: 1960    MRN: [id number]   Acct: [id number]    Primary Care Physician: Ishan Feliz MD    Admit date:  7/16/2025 10:48 AM  Discharge date: 7/18/2025    Discharge Diagnoses:     1.)  Patient had CP and SOB, has a hx of HFrEF.  Treated with GDMT and lasix, Jardiance has been added. Evaluated by cardiology, echo showed reduced EF, Lifevest has been prescribed. Patient's symptoms have improved, discharged home with the plan to follow-up outpatient.    Follow-up:  Call today/tomorrow for a follow up appointment with Ishan Feliz MD , or return to the Emergency Room with worsening symptoms    Stressed to patient the importance of following up with primary care doctor for further workup/management of symptoms.  Pt verbalizes understanding and agrees with plan.    Discharge Medication Changes:       Medication List        ASK your doctor about these medications      albuterol sulfate  (90 Base) MCG/ACT inhaler  Commonly known as: Proventil HFA  Inhale 1-2 puffs into the lungs every 4 hours as needed for Wheezing.     butalbital-acetaminophen-caffeine -40 MG per tablet  Commonly known as: Fioricet  Take 1 tablet by mouth every 6 hours as needed for Headaches.     Cepacol Mouthwash/Gargle 0.05 % Liqd  Generic drug: Cetylpyridinium Chloride  Take 1 lozenge by mouth 4 times daily as needed (Cough)     divalproex 500 MG DR tablet  Commonly known as: DEPAKOTE     furosemide 20 MG tablet  Commonly known as: Lasix  Take 1 tablet by mouth daily     lisinopril 10 MG tablet  Commonly known as: PRINIVIL;ZESTRIL     metoprolol succinate 25 MG extended release tablet  Commonly known as: TOPROL XL  Take 0.5 tablets by mouth daily     oxazepam 30 MG capsule  Commonly known as: SERAX     oxyCODONE-acetaminophen 5-325 MG per tablet  Commonly known as: Percocet  Take 1 tablet by mouth every 8 hours as needed for Pain     QUEtiapine 50 MG extended

## 2025-07-19 NOTE — CARE COORDINATION
Late Entry  Received a call from Tawanda martinez [facility] asking for face to face for the vest.  Sent requested documentation.

## 2025-07-21 NOTE — PROGRESS NOTES
Physician Progress Note      PATIENT:               KAYA JOHNSON  CSN #:                  [id number]  :                       1960  ADMIT DATE:       2025 10:48 AM  DISCH DATE:        2025 3:39 PM  RESPONDING  PROVIDER #:        Sly Johnson MD          QUERY TEXT:    Please clarify the patient?s nutritional status:    The clinical indicators include:  BMI 21.63    H/P  Presents left sided chest pain radiating to his left arm. Patient has a   history of tricuspid valve replacement in 2025 at [facility].    Patient is also complaining of shortness of breath and left arm weakness that   he believes is related to the chest pain    Current smoker  CXR No acute    Card Consult  The EKG on admission showed normal sinus rhythm, T wave inversions in the   lateral leads that were present on the previous EKG, left ventricular   hypertrophy, left axis deviation.    Acute exacerbation of chronic systolic heart failure with reduced ejection   fraction due to defaulting medications.  Severe TR and AI status post tricuspid annuloplasty ring and bioprosthetic AVR   along with aortic root repair.  Hx of pulmonary embolism  Essential hypertension  COPD  CKD       IM  Chest pain, currently resolved  Will reassess as patient has possible need for AICD.    Treatments labs monitor ECHO CXR Card Consult  Options provided:  -- Protein calorie malnutrition mild  -- Protein calorie malnutrition moderate  -- Protein calorie malnutrition severe  -- Underweight with BMI 21.63  -- Cachexia  -- Abnormal weight loss  -- Other - I will add my own diagnosis  -- Disagree - Not applicable / Not valid  -- Disagree - Clinically unable to determine / Unknown  -- Refer to Clinical Documentation Reviewer    PROVIDER RESPONSE TEXT:    This patient has moderate protein calorie malnutrition.    Query created by: Sapphire Childers on 2025 10:00 AM      Electronically signed by:  Sly Johnson MD 2025 10:10

## 2025-07-25 NOTE — PROGRESS NOTES
OBS/CDU   Progress NOTE      Patients PCP is:  Ishan Feliz MD        SUBJECTIVE      Patient's chest pain and shortness of breath have improved.  Cardiology evaluated.  Patient is waiting for a LifeVest to be discharged home.    PHYSICAL EXAM      General: NAD, AO X 3  Heent: EOMI, PERRL  Neck: SUPPLE, NO JVD  Cardiovascular: RRR  Pulmonary: CTAB  Abdomen: SOFT, NTTP, ND, +BS  Extremities: +2/4 PULSES DISTAL, NO SWELLING  Neuro / Psych: NO NUMBNESS OR TINGLING, MENTATION AT BASELINE    PERTINENT TEST /EXAMS      I have reviewed all available laboratory results.    MEDICATIONS CURRENT   No current facility-administered medications for this encounter.      All medication charted and reviewed.    CONSULTS      IP CONSULT TO CARDIOLOGY    ASSESSMENT/PLAN       James Gabriel is a 65 y.o. male who presents with chest pain and shortness of breath, found to have reduced EF, cardiology following.    Continue GDMT, Lasix and Jardiance  Continue home medications and pain control  Monitor vitals, labs, and imaging  DISPO: LifeVest and pending consults; will be discharged home with outpatient follow-up    --  Liat Mclaughlin MD  Observation Physician     This dictation was generated by voice recognition computer software.  Although all attempts are made to edit the dictation for accuracy, there may be errors in the transcription that are not intended.

## 2025-07-26 NOTE — PROGRESS NOTES
Physician Progress Note      PATIENT:               KAYA JOHNSON  [facility] #:                  [id number]  :                       1960  ADMIT DATE:       2025 10:48 AM  DISCH DATE:        2025 3:39 PM  RESPONDING  PROVIDER #:        Teresa Howe DO          QUERY TEXT:    Chest pain is documented in the H/P along with acute on chronic systolic CKF,   with only PO Lasix given during admission . Please specify the underlying   cause:    The clinical indicators include:  Troponin's 60>53 BNP not performed  B/P's 154/104 120/87 117/96 HR 67-76 Room Air    H/P  presents left sided chest pain radiating to his left arm. Patient has a   history of tricuspid valve replacement in 2025 at [facility].    Patient is also complaining of shortness of breath and left arm weakness that   he believes is related to the chest pain.  On exam, patient is resting in bed.    He still endorses some mild left-sided chest pain and left arm weakness.  He   currently denies shortness of breath, abdominal pain, nausea, vomiting, and   fatigue.      CXR No acute    Cardiology Consult  The patient states that he has not taking his home medications for over a   month as he did not go to his PCP for his refills.    Chest pain shortness of breath  Heart failure with heart failure with reduced ejection fraction-echo on   3/11/2025 showed an EF of 20 to 30%    Acute exacerbation of chronic systolic heart failure with reduced ejection   fraction due to defaulting medications.    ECHO   Left Ventricle: Severely reduced left ventricular systolic function with a   visually estimated EF of 5 - 10%. EF by 2D Simpsons Biplane is 14%. Left   ventricle is severely dilated. Severe global hypokinesis present.  -  Right Ventricle: Visually reduced systolic function.         Card  Systolic CHF.  Fluid volume stable.  Continue ACE, BB, Aldactone and   jardiance.  ADD po lasix  ECHO reviewed and shows reduced EF  HX Normal

## 2025-07-28 ENCOUNTER — APPOINTMENT (OUTPATIENT)
Dept: GENERAL RADIOLOGY | Age: 65
End: 2025-07-28
Payer: MEDICARE

## 2025-07-28 ENCOUNTER — APPOINTMENT (OUTPATIENT)
Dept: CT IMAGING | Age: 65
End: 2025-07-28
Payer: MEDICARE

## 2025-07-28 ENCOUNTER — HOSPITAL ENCOUNTER (INPATIENT)
Age: 65
LOS: 3 days | Discharge: HOME | End: 2025-07-31
Attending: EMERGENCY MEDICINE
Payer: MEDICARE

## 2025-07-28 DIAGNOSIS — R07.81 PLEURODYNIA: ICD-10-CM

## 2025-07-28 DIAGNOSIS — I50.9 DECOMPENSATED HEART FAILURE (HCC): Primary | ICD-10-CM

## 2025-07-28 PROBLEM — R07.9 CHEST PAIN: Status: ACTIVE | Noted: 2025-07-28

## 2025-07-28 LAB
ALBUMIN SERPL-MCNC: 4.1 G/DL (ref 3.5–5.2)
ALBUMIN/GLOB SERPL: 1.3 {RATIO} (ref 1–2.5)
ALP SERPL-CCNC: 62 U/L (ref 40–129)
ALT SERPL-CCNC: 31 U/L (ref 10–50)
ANION GAP SERPL CALCULATED.3IONS-SCNC: 13 MMOL/L (ref 9–16)
AST SERPL-CCNC: 41 U/L (ref 10–50)
BASOPHILS # BLD: 0.03 K/UL (ref 0–0.2)
BASOPHILS NFR BLD: 0 % (ref 0–2)
BILIRUB SERPL-MCNC: 1.5 MG/DL (ref 0–1.2)
BODY TEMPERATURE: 37
BODY TEMPERATURE: 37
BUN SERPL-MCNC: 10 MG/DL (ref 8–23)
CALCIUM SERPL-MCNC: 9.4 MG/DL (ref 8.6–10.4)
CHLORIDE SERPL-SCNC: 103 MMOL/L (ref 98–107)
CO2 SERPL-SCNC: 25 MMOL/L (ref 20–31)
COHGB MFR BLD: 4 % (ref 0–5)
COHGB MFR BLD: 5.9 % (ref 0–5)
CREAT SERPL-MCNC: 1 MG/DL (ref 0.7–1.2)
D DIMER PPP FEU-MCNC: 9.77 UG/ML FEU (ref 0–0.57)
EOSINOPHIL # BLD: <0.03 K/UL (ref 0–0.44)
EOSINOPHILS RELATIVE PERCENT: 0 % (ref 1–4)
ERYTHROCYTE [DISTWIDTH] IN BLOOD BY AUTOMATED COUNT: 16.5 % (ref 11.8–14.4)
FIO2 ON VENT: ABNORMAL %
FIO2 ON VENT: ABNORMAL %
GFR, ESTIMATED: 83 ML/MIN/1.73M2
GLUCOSE SERPL-MCNC: 92 MG/DL (ref 74–99)
HCO3 VENOUS: 24.7 MMOL/L (ref 24–30)
HCO3 VENOUS: 26.3 MMOL/L (ref 24–30)
HCT VFR BLD AUTO: 44.9 % (ref 40.7–50.3)
HGB BLD-MCNC: 15.5 G/DL (ref 13–17)
IMM GRANULOCYTES # BLD AUTO: 0.03 K/UL (ref 0–0.3)
IMM GRANULOCYTES NFR BLD: 0 %
LIPASE SERPL-CCNC: 25 U/L (ref 13–60)
LYMPHOCYTES NFR BLD: 2.62 K/UL (ref 1.1–3.7)
LYMPHOCYTES RELATIVE PERCENT: 25 % (ref 24–43)
MCH RBC QN AUTO: 32.7 PG (ref 25.2–33.5)
MCHC RBC AUTO-ENTMCNC: 34.5 G/DL (ref 28.4–34.8)
MCV RBC AUTO: 94.7 FL (ref 82.6–102.9)
MONOCYTES NFR BLD: 0.71 K/UL (ref 0.1–1.2)
MONOCYTES NFR BLD: 7 % (ref 3–12)
NEUTROPHILS NFR BLD: 68 % (ref 36–65)
NEUTS SEG NFR BLD: 7.25 K/UL (ref 1.5–8.1)
NRBC BLD-RTO: 0 PER 100 WBC
O2 SAT, VEN: 76.3 % (ref 60–85)
O2 SAT, VEN: 89.7 % (ref 60–85)
PCO2 VENOUS: 37.4 MM HG (ref 39–55)
PCO2 VENOUS: 41.7 MM HG (ref 39–55)
PH VENOUS: 7.42 (ref 7.32–7.42)
PH VENOUS: 7.44 (ref 7.32–7.42)
PLATELET # BLD AUTO: 270 K/UL (ref 138–453)
PMV BLD AUTO: 10.3 FL (ref 8.1–13.5)
PO2 VENOUS: 39.3 MM HG (ref 30–50)
PO2 VENOUS: 56.2 MM HG (ref 30–50)
POSITIVE BASE EXCESS, VEN: 0.8 MMOL/L (ref 0–2)
POSITIVE BASE EXCESS, VEN: 1.6 MMOL/L (ref 0–2)
POTASSIUM SERPL-SCNC: 3.5 MMOL/L (ref 3.7–5.3)
PROT SERPL-MCNC: 7.3 G/DL (ref 6.6–8.7)
RBC # BLD AUTO: 4.74 M/UL (ref 4.21–5.77)
RBC # BLD: ABNORMAL 10*6/UL
SODIUM SERPL-SCNC: 141 MMOL/L (ref 136–145)
TROPONIN I SERPL HS-MCNC: 24 NG/L (ref 0–22)
TROPONIN I SERPL HS-MCNC: 28 NG/L (ref 0–22)
WBC OTHER # BLD: 10.7 K/UL (ref 3.5–11.3)

## 2025-07-28 PROCEDURE — 94761 N-INVAS EAR/PLS OXIMETRY MLT: CPT

## 2025-07-28 PROCEDURE — 85025 COMPLETE CBC W/AUTO DIFF WBC: CPT

## 2025-07-28 PROCEDURE — 84484 ASSAY OF TROPONIN QUANT: CPT

## 2025-07-28 PROCEDURE — 5A09357 ASSISTANCE WITH RESPIRATORY VENTILATION, LESS THAN 24 CONSECUTIVE HOURS, CONTINUOUS POSITIVE AIRWAY PRESSURE: ICD-10-PCS | Performed by: FAMILY MEDICINE

## 2025-07-28 PROCEDURE — 93005 ELECTROCARDIOGRAM TRACING: CPT

## 2025-07-28 PROCEDURE — 80053 COMPREHEN METABOLIC PANEL: CPT

## 2025-07-28 PROCEDURE — 6360000002 HC RX W HCPCS: Performed by: EMERGENCY MEDICINE

## 2025-07-28 PROCEDURE — 71046 X-RAY EXAM CHEST 2 VIEWS: CPT

## 2025-07-28 PROCEDURE — 6360000004 HC RX CONTRAST MEDICATION

## 2025-07-28 PROCEDURE — 96374 THER/PROPH/DIAG INJ IV PUSH: CPT

## 2025-07-28 PROCEDURE — 96375 TX/PRO/DX INJ NEW DRUG ADDON: CPT

## 2025-07-28 PROCEDURE — 94660 CPAP INITIATION&MGMT: CPT

## 2025-07-28 PROCEDURE — 2700000000 HC OXYGEN THERAPY PER DAY

## 2025-07-28 PROCEDURE — 6370000000 HC RX 637 (ALT 250 FOR IP)

## 2025-07-28 PROCEDURE — 99285 EMERGENCY DEPT VISIT HI MDM: CPT

## 2025-07-28 PROCEDURE — 36415 COLL VENOUS BLD VENIPUNCTURE: CPT

## 2025-07-28 PROCEDURE — 71260 CT THORAX DX C+: CPT

## 2025-07-28 PROCEDURE — 82805 BLOOD GASES W/O2 SATURATION: CPT

## 2025-07-28 PROCEDURE — 99223 1ST HOSP IP/OBS HIGH 75: CPT | Performed by: STUDENT IN AN ORGANIZED HEALTH CARE EDUCATION/TRAINING PROGRAM

## 2025-07-28 PROCEDURE — 6360000002 HC RX W HCPCS

## 2025-07-28 PROCEDURE — 83690 ASSAY OF LIPASE: CPT

## 2025-07-28 PROCEDURE — 2060000000 HC ICU INTERMEDIATE R&B

## 2025-07-28 PROCEDURE — 85379 FIBRIN DEGRADATION QUANT: CPT

## 2025-07-28 RX ORDER — POLYETHYLENE GLYCOL 3350 17 G/17G
17 POWDER, FOR SOLUTION ORAL DAILY PRN
Status: DISCONTINUED | OUTPATIENT
Start: 2025-07-28 | End: 2025-07-31 | Stop reason: HOSPADM

## 2025-07-28 RX ORDER — ACETAMINOPHEN 650 MG/1
650 SUPPOSITORY RECTAL EVERY 6 HOURS PRN
Status: DISCONTINUED | OUTPATIENT
Start: 2025-07-28 | End: 2025-07-31 | Stop reason: HOSPADM

## 2025-07-28 RX ORDER — POTASSIUM CHLORIDE 7.45 MG/ML
10 INJECTION INTRAVENOUS PRN
Status: DISCONTINUED | OUTPATIENT
Start: 2025-07-28 | End: 2025-07-31 | Stop reason: HOSPADM

## 2025-07-28 RX ORDER — ASPIRIN 81 MG/1
324 TABLET, CHEWABLE ORAL ONCE
Status: COMPLETED | OUTPATIENT
Start: 2025-07-28 | End: 2025-07-28

## 2025-07-28 RX ORDER — SODIUM CHLORIDE 0.9 % (FLUSH) 0.9 %
5-40 SYRINGE (ML) INJECTION EVERY 12 HOURS SCHEDULED
Status: DISCONTINUED | OUTPATIENT
Start: 2025-07-28 | End: 2025-07-31 | Stop reason: HOSPADM

## 2025-07-28 RX ORDER — NITROGLYCERIN 20 MG/100ML
5-200 INJECTION INTRAVENOUS CONTINUOUS
Status: DISCONTINUED | OUTPATIENT
Start: 2025-07-28 | End: 2025-07-30

## 2025-07-28 RX ORDER — FENTANYL CITRATE 50 UG/ML
50 INJECTION, SOLUTION INTRAMUSCULAR; INTRAVENOUS ONCE
Status: COMPLETED | OUTPATIENT
Start: 2025-07-28 | End: 2025-07-28

## 2025-07-28 RX ORDER — ONDANSETRON 2 MG/ML
4 INJECTION INTRAMUSCULAR; INTRAVENOUS EVERY 6 HOURS PRN
Status: DISCONTINUED | OUTPATIENT
Start: 2025-07-28 | End: 2025-07-31 | Stop reason: HOSPADM

## 2025-07-28 RX ORDER — POTASSIUM CHLORIDE 1500 MG/1
40 TABLET, EXTENDED RELEASE ORAL ONCE
Status: COMPLETED | OUTPATIENT
Start: 2025-07-28 | End: 2025-07-28

## 2025-07-28 RX ORDER — LISINOPRIL 10 MG/1
10 TABLET ORAL DAILY
Status: DISCONTINUED | OUTPATIENT
Start: 2025-07-28 | End: 2025-07-28

## 2025-07-28 RX ORDER — SODIUM CHLORIDE 0.9 % (FLUSH) 0.9 %
5-40 SYRINGE (ML) INJECTION PRN
Status: DISCONTINUED | OUTPATIENT
Start: 2025-07-28 | End: 2025-07-31 | Stop reason: HOSPADM

## 2025-07-28 RX ORDER — CYCLOBENZAPRINE HCL 10 MG
10 TABLET ORAL ONCE
Status: COMPLETED | OUTPATIENT
Start: 2025-07-28 | End: 2025-07-28

## 2025-07-28 RX ORDER — ACETAMINOPHEN 325 MG/1
650 TABLET ORAL EVERY 6 HOURS PRN
Status: DISCONTINUED | OUTPATIENT
Start: 2025-07-28 | End: 2025-07-31 | Stop reason: HOSPADM

## 2025-07-28 RX ORDER — LISINOPRIL 10 MG/1
10 TABLET ORAL DAILY
Status: DISCONTINUED | OUTPATIENT
Start: 2025-07-29 | End: 2025-07-30

## 2025-07-28 RX ORDER — ENOXAPARIN SODIUM 100 MG/ML
40 INJECTION SUBCUTANEOUS DAILY
Status: DISCONTINUED | OUTPATIENT
Start: 2025-07-29 | End: 2025-07-29

## 2025-07-28 RX ORDER — ONDANSETRON 2 MG/ML
4 INJECTION INTRAMUSCULAR; INTRAVENOUS ONCE
Status: COMPLETED | OUTPATIENT
Start: 2025-07-28 | End: 2025-07-28

## 2025-07-28 RX ORDER — METOPROLOL SUCCINATE 25 MG/1
25 TABLET, EXTENDED RELEASE ORAL DAILY
Status: DISCONTINUED | OUTPATIENT
Start: 2025-07-28 | End: 2025-07-28

## 2025-07-28 RX ORDER — TAMSULOSIN HYDROCHLORIDE 0.4 MG/1
0.4 CAPSULE ORAL DAILY
Status: DISCONTINUED | OUTPATIENT
Start: 2025-07-29 | End: 2025-07-31 | Stop reason: HOSPADM

## 2025-07-28 RX ORDER — ASPIRIN 81 MG/1
81 TABLET, CHEWABLE ORAL DAILY
Status: DISCONTINUED | OUTPATIENT
Start: 2025-07-29 | End: 2025-07-31 | Stop reason: HOSPADM

## 2025-07-28 RX ORDER — SODIUM CHLORIDE 9 MG/ML
INJECTION, SOLUTION INTRAVENOUS PRN
Status: DISCONTINUED | OUTPATIENT
Start: 2025-07-28 | End: 2025-07-31 | Stop reason: HOSPADM

## 2025-07-28 RX ORDER — IPRATROPIUM BROMIDE AND ALBUTEROL SULFATE 2.5; .5 MG/3ML; MG/3ML
1 SOLUTION RESPIRATORY (INHALATION)
Status: DISCONTINUED | OUTPATIENT
Start: 2025-07-28 | End: 2025-07-29

## 2025-07-28 RX ORDER — METOPROLOL SUCCINATE 25 MG/1
12.5 TABLET, EXTENDED RELEASE ORAL DAILY
Status: DISCONTINUED | OUTPATIENT
Start: 2025-07-29 | End: 2025-07-30

## 2025-07-28 RX ORDER — ONDANSETRON 4 MG/1
4 TABLET, ORALLY DISINTEGRATING ORAL EVERY 8 HOURS PRN
Status: DISCONTINUED | OUTPATIENT
Start: 2025-07-28 | End: 2025-07-31 | Stop reason: HOSPADM

## 2025-07-28 RX ORDER — IOPAMIDOL 755 MG/ML
75 INJECTION, SOLUTION INTRAVASCULAR
Status: COMPLETED | OUTPATIENT
Start: 2025-07-28 | End: 2025-07-28

## 2025-07-28 RX ORDER — MAGNESIUM SULFATE IN WATER 40 MG/ML
2000 INJECTION, SOLUTION INTRAVENOUS PRN
Status: DISCONTINUED | OUTPATIENT
Start: 2025-07-28 | End: 2025-07-31 | Stop reason: HOSPADM

## 2025-07-28 RX ORDER — POTASSIUM CHLORIDE 1500 MG/1
40 TABLET, EXTENDED RELEASE ORAL PRN
Status: DISCONTINUED | OUTPATIENT
Start: 2025-07-28 | End: 2025-07-31 | Stop reason: HOSPADM

## 2025-07-28 RX ORDER — FUROSEMIDE 20 MG/1
20 TABLET ORAL DAILY
Status: DISCONTINUED | OUTPATIENT
Start: 2025-07-29 | End: 2025-07-31 | Stop reason: HOSPADM

## 2025-07-28 RX ADMIN — ASPIRIN 324 MG: 81 TABLET, CHEWABLE ORAL at 15:24

## 2025-07-28 RX ADMIN — IOPAMIDOL 75 ML: 755 INJECTION, SOLUTION INTRAVENOUS at 18:59

## 2025-07-28 RX ADMIN — CYCLOBENZAPRINE 10 MG: 10 TABLET, FILM COATED ORAL at 15:25

## 2025-07-28 RX ADMIN — ONDANSETRON 4 MG: 2 INJECTION, SOLUTION INTRAMUSCULAR; INTRAVENOUS at 15:25

## 2025-07-28 RX ADMIN — FENTANYL CITRATE 50 MCG: 50 INJECTION INTRAMUSCULAR; INTRAVENOUS at 19:28

## 2025-07-28 RX ADMIN — POTASSIUM CHLORIDE 40 MEQ: 1500 TABLET, EXTENDED RELEASE ORAL at 21:05

## 2025-07-28 RX ADMIN — NITROGLYCERIN 50 MCG/MIN: 20 INJECTION INTRAVENOUS at 16:29

## 2025-07-28 ASSESSMENT — LIFESTYLE VARIABLES
HOW OFTEN DO YOU HAVE A DRINK CONTAINING ALCOHOL: 2-4 TIMES A MONTH
HOW MANY STANDARD DRINKS CONTAINING ALCOHOL DO YOU HAVE ON A TYPICAL DAY: 3 OR 4

## 2025-07-28 ASSESSMENT — HEART SCORE: ECG: NON-SPECIFC REPOLARIZATION DISTURBANCE/LBTB/PM

## 2025-07-28 ASSESSMENT — ENCOUNTER SYMPTOMS: SHORTNESS OF BREATH: 1

## 2025-07-28 ASSESSMENT — PAIN SCALES - GENERAL: PAINLEVEL_OUTOF10: 10

## 2025-07-28 NOTE — ED TRIAGE NOTES
Pt arrived to ED 4 from triage. Pt arrives with complaint of chest pain and SOB, along with dizziness. Pt states that a piece of his heart is only pumping at 5-10%. Pt states that he goes to [facility] for all of his needs, but his son brought him here.   Pt denies any vision changes at this moment. Pt denies headache, n/v/d.   Cardiac monitor placed on Pt at this time. Pt initally refused monitor. EKG completed in triage. Line placed by writer

## 2025-07-28 NOTE — ED PROVIDER NOTES
[facility] EMERGENCY DEPARTMENT  Emergency Department  Faculty Sign-Out Addendum     Note Started: 6:00 PM EDT     Care of James Gabriel was assumed from previous attending and is being seen for Chest Pain, Shortness of Breath, and Dizziness  .  The patient's initial evaluation and plan have been discussed with the prior provider who initially evaluated the patient.    Handoff taken on the following patient from prior Attending Physician:    Attestation    I was available and discussed any additional care issues that arose and coordinated the management plans with the resident(s) caring for the patient during my duty period. Any areas of disagreement with resident’s documentation of care or procedures are noted on the chart. I was personally present for the key portions of any/all procedures during my duty period. I have documented in the chart those procedures where I was not present during the key portions.      EMERGENCY DEPARTMENT COURSE / MEDICAL DECISION MAKING:       MEDICATIONS GIVEN:  Orders Placed This Encounter   Medications    ondansetron (ZOFRAN) injection 4 mg    cyclobenzaprine (FLEXERIL) tablet 10 mg    DISCONTD: metoprolol succinate (TOPROL XL) extended release tablet 25 mg    DISCONTD: lisinopril (PRINIVIL;ZESTRIL) tablet 10 mg    aspirin chewable tablet 324 mg    nitroGLYCERIN 200 mcg/mL in dextrose 5%     Titrate Infusion?:   Yes     Initial Infusion Dose::   Other     Other (mcg/min)::   50mcg/min     Goal of Therapy is::   Other     Other Goal::   SOB releif     Contact Provider if::   SBP less than 90 mmHg    potassium chloride (KLOR-CON M) extended release tablet 40 mEq       LABS / RADIOLOGY:     Labs Reviewed   CBC WITH AUTO DIFFERENTIAL - Abnormal; Notable for the following components:       Result Value    RDW 16.5 (*)     Neutrophils % 68 (*)     Eosinophils % 0 (*)     All other components within normal limits   D-DIMER, QUANTITATIVE - Abnormal; Notable for the following

## 2025-07-28 NOTE — ED PROVIDER NOTES
[facility] EMERGENCY DEPARTMENT  Emergency Department Encounter  Emergency Medicine Resident     Pt Name:James Gabriel  MRN: [id number]  Birthdate 1960  Date of evaluation: 7/28/25  PCP:  Ishan Feliz MD  Note Started: 6:06 PM EDT      CHIEF COMPLAINT       Chief Complaint   Patient presents with    Chest Pain    Shortness of Breath    Dizziness       HISTORY OF PRESENT ILLNESS  (Location/Symptom, Timing/Onset, Context/Setting, Quality, Duration, Modifying Factors, Severity.)      James Gabriel is a 65 y.o. male who presents with chest pain and shortness of breath.  Patient has have a history of heart failure with an ejection fraction of 5-10%.  Patient was seen and admitted to the observation unit several days ago and was evaluated by cardiology.  Cardiology had recommendation for LifeVest however patient did not have it yet installed.  Patient states that since yesterday has been having chest pain which progressed with worsening of shortness of breath today.  Patient states he was also feeling dizzy with a sensation of passing out and needed his son to assist him with ambulating.  Patient otherwise denies any trauma to the head, LOC.  Patient is supposed to be on blood thinners however is not taking it.  Patient does have a history of nonrheumatic aortic valve replacement done in March 2025    PAST MEDICAL / SURGICAL / SOCIAL / FAMILY HISTORY      has a past medical history of Chronic kidney disease, Degenerative disk disease, Depression, Hematuria, Paranoia (HCC), and Schizophrenia (HCC).       has a past surgical history that includes hernia repair (3/2010); Cystoscopy (12/18/12); Ureteroscopy; and Lithotripsy (12/18/12).    Social History     Socioeconomic History    Marital status:      Spouse name: Not on file    Number of children: Not on file    Years of education: Not on file    Highest education level: Not on file   Occupational History    Not on file   Tobacco Use     sounds: Normal breath sounds.   Skin:     General: Skin is warm and dry.      Findings: No rash.   Neurological:      Mental Status: He is alert.           DDX/DIAGNOSTIC RESULTS / EMERGENCY DEPARTMENT COURSE / MDM     Medical Decision Making  Amount and/or Complexity of Data Reviewed  Labs: ordered. Decision-making details documented in ED Course.  Radiology: ordered. Decision-making details documented in ED Course.  ECG/medicine tests: ordered.    Risk  OTC drugs.  Prescription drug management.  Decision regarding hospitalization.    65-year-old male presented ED with chest pain shortness of breath.  History of nonischemic cardiomyopathy with an EF of 5-10%.  Concern for PE, ACS, hypertensive crisis, hypertensive emergency, hypertensive urgency, decompensated heart failure.  Will obtain CBC, CMP, troponin, EKG, D-dimer and BNP.  As per chart review patient is on metoprolol, lisinopril, Jardiance and Aldactone.  Patient states he has not taken his medications today as he was not feeling well.  Patient states that he had several episodes of nausea with vomiting and abdominal pain        EMERGENCY DEPARTMENT COURSE:      ED Course as of 07/28/25 2215 Mon Jul 28, 2025   1559 EKG Interpretation   Interpreted by Kanu Barroso DO    Rhythm: normal sinus   Rate: normal  Axis: normal  Ectopy: none  Conduction: normal  ST Segments: Nonspecific changes 1 and aVL  T Waves: Flattening in leads I and aVL inversions in V5 V6  Q Waves: none    Clinical Impression: T wave inversions laterally V5 V6 and ST changes in aVL and I [WK]   1609 D-Dimer, Quantitative(!):    D-Dimer, Quant 9.77(!) [AS]   1727 Blood Gas, Venous(!):    pH, Dwain 7.438(!)   pCO2, Dwain 37.4(!)   pO2, Dwain 39.3   Bicarbonate, Venous 24.7   Positive Base Excess, Dwain 0.8   O2 Saturation Venous 76.3   Carboxyhemoglobin 5.9(!)   Pt Temp 37.0   FIO2 INFORMATION NOT PROVIDED [AS]   1849 XR CHEST (2 VW) [AS]   1849 No acute process.   [AS]   1939 Troponin(!):

## 2025-07-28 NOTE — ED NOTES
Labeled blood specimens sent to lab via tube system.    [x] Green/yellow  [] Lavender   [] On ice   [] Blue   [] Green/black [] On ice  [] Yellow  [] On ice  [] Red  [] Pink  [] Blood Cultures  [] x1 [] x2    [] Ped Green  [] On ice  [] Ped Lavender  [] On ice    [] Ped Yellow  [] On ice  [] Ped Red

## 2025-07-28 NOTE — ED NOTES
Registration to bedside, Pt stated \"something happened, come back later\".  Writer went to bedside to medicate and pt stated \"I'm suing your ass's, where's the shant supervisor, I've been waiting for over an hour.\" When writer explained our supervisors are aware and will be by when they are able to due to being busy, pt stated \"I'm calling the , then you're all screwed. I want you all suspended, this is not going away.\"   Writer exited the room.

## 2025-07-28 NOTE — ED NOTES
Writer and assisting Rns at bedside. When assisting the pt into a gown and placing monitor, pt became agitated stating we are rushing him. Pt started getting aggressive and raising voice when writer and other RN were talking and attempting to place pt on monitor. Pt stating he wants to see a supervisor.  Pt asked some nursing staff to leave and come back to see him in a little bit. Writer explained we needed to get a monitor placed and vitals on the pt.   Pt then starting raising voice stating he is here to be seen and to grab a supervisor. Akiko CHAND came to bedside from overhearing the incident in [facility]. Pt refused to remain still for accurate vitals and Cardiac monitor.   [employer] [employer] supervisor at bedside with patient.

## 2025-07-28 NOTE — ED PROVIDER NOTES
[facility]     Emergency Department     Faculty Note/ Attestation      Pt Name: James Gabriel                                       MRN: [id number]  Birthdate 1960  Date of evaluation: 7/28/2025  Note Started: 3:44 PM EDT    Patients PCP:    Ishan Feliz MD    Attestation  I performed a history and physical examination of the patient and discussed management with the resident. I reviewed the resident’s note and agree with the documented findings and plan of care. Any areas of disagreement are noted on the chart. I was personally present for the key portions of any procedures. I have documented in the chart those procedures where I was not present during the key portions. I have reviewed the emergency nurses triage note. I agree with the chief complaint, past medical history, past surgical history, allergies, medications, social and family history as documented unless otherwise noted below.    For Physician Assistant/ Nurse Practitioner cases/documentation I have personally evaluated this patient and have completed at least one if not all key elements of the E/M (history, physical exam, and MDM). Additional findings are as noted.    Initial Screens:     Heart Score for chest pain patients  History: Moderately Suspicious  ECG: Non-Specifc repolarization disturbance/LBTB/PM  Patient Age: > 45 and < 65 years  *Risk factors for Atherosclerotic disease: Cigarette smoking, Hypercholesterolemia, Hypertension, Coronary Artery Disease  Risk Factors: > 3 Risk factors or history of atherosclerotic disease*  Troponin: < 1X normal limit  Heart Score Total: 5  [place] Coma Scale  Eye Opening: Spontaneous  Best Verbal Response: Oriented  Best Motor Response: Obeys commands  Jennifer Coma Scale Score: 15    Vitals:    Vitals:    07/28/25 1443 07/28/25 1444 07/28/25 1500 07/28/25 1515   BP: (!) 212/107 (!) 224/148 (!) 172/130 (!) 162/131   Pulse:   67 68   Resp:   18 20   Temp:       SpO2: 97% 96%  96% 97%       CHIEF COMPLAINT       Chief Complaint   Patient presents with   • Chest Pain   • Shortness of Breath   • Dizziness     Patient is a 65-year-old male status post aortic valve replacement not on any of his anticoagulation that he is supposed to be taking patient has increasing chest pain shortness of breath and feeling lightheaded if he stands up too quick for the last 24 hours things have progressively worsened patient felt like he was going to pass out today if he stood up and had his family bring him in patient has no fevers chills nausea vomiting sweats cough or wheezing    EMERGENCY DEPARTMENT COURSE:     -------------------------  BP: (!) 162/131, Temp: 98.8 °F (37.1 °C), Pulse: 68, Respirations: 20  Physical Exam  Constitutional:       Appearance: He is well-developed. He is not diaphoretic.   HENT:      Head: Normocephalic and atraumatic.      Right Ear: External ear normal.      Left Ear: External ear normal.   Eyes:      General: No scleral icterus.        Right eye: No discharge.         Left eye: No discharge.   Neck:      Trachea: No tracheal deviation.   Pulmonary:      Breath sounds: No stridor.      Comments: Patient has B-lines in the bases of his lungs no B-lines in the apices extremely poor ejection fraction less than 10% on point-of-care ultrasound stroke-volume also extremely low patient needs heart rate in order to maintain cardiac output  Musculoskeletal:         General: Normal range of motion.      Cervical back: Normal range of motion.   Skin:     General: Skin is warm and dry.   Neurological:      Mental Status: He is alert and oriented to person, place, and time.      Coordination: Coordination normal.   Psychiatric:         Behavior: Behavior normal.           Comments  Medical Decision Making  Amount and/or Complexity of Data Reviewed  Labs: ordered.  Radiology: ordered.  ECG/medicine tests: ordered.    Risk  OTC drugs.  Prescription drug management.    The patient presents

## 2025-07-29 ENCOUNTER — APPOINTMENT (OUTPATIENT)
Dept: CT IMAGING | Age: 65
End: 2025-07-29
Payer: MEDICARE

## 2025-07-29 PROBLEM — I50.9 DECOMPENSATED HEART FAILURE (HCC): Status: ACTIVE | Noted: 2025-07-29

## 2025-07-29 LAB
ANION GAP SERPL CALCULATED.3IONS-SCNC: 13 MMOL/L (ref 9–16)
BNP SERPL-MCNC: 1766 PG/ML (ref 0–125)
BUN SERPL-MCNC: 12 MG/DL (ref 8–23)
CALCIUM SERPL-MCNC: 9.1 MG/DL (ref 8.6–10.4)
CHLORIDE SERPL-SCNC: 102 MMOL/L (ref 98–107)
CO2 SERPL-SCNC: 24 MMOL/L (ref 20–31)
CREAT SERPL-MCNC: 1.1 MG/DL (ref 0.7–1.2)
EKG ATRIAL RATE: 67 BPM
EKG P AXIS: 41 DEGREES
EKG P-R INTERVAL: 174 MS
EKG Q-T INTERVAL: 430 MS
EKG QRS DURATION: 96 MS
EKG QTC CALCULATION (BAZETT): 454 MS
EKG R AXIS: -29 DEGREES
EKG T AXIS: 82 DEGREES
EKG VENTRICULAR RATE: 67 BPM
ERYTHROCYTE [DISTWIDTH] IN BLOOD BY AUTOMATED COUNT: 16.3 % (ref 11.8–14.4)
GFR, ESTIMATED: 74 ML/MIN/1.73M2
GLUCOSE SERPL-MCNC: 123 MG/DL (ref 74–99)
HCT VFR BLD AUTO: 38.2 % (ref 40.7–50.3)
HGB BLD-MCNC: 13 G/DL (ref 13–17)
MCH RBC QN AUTO: 32.3 PG (ref 25.2–33.5)
MCHC RBC AUTO-ENTMCNC: 34 G/DL (ref 28.4–34.8)
MCV RBC AUTO: 94.8 FL (ref 82.6–102.9)
NRBC BLD-RTO: 0 PER 100 WBC
PLATELET # BLD AUTO: 238 K/UL (ref 138–453)
PMV BLD AUTO: 9.9 FL (ref 8.1–13.5)
POTASSIUM SERPL-SCNC: 3.9 MMOL/L (ref 3.7–5.3)
RBC # BLD AUTO: 4.03 M/UL (ref 4.21–5.77)
SODIUM SERPL-SCNC: 139 MMOL/L (ref 136–145)
TROPONIN I SERPL HS-MCNC: 23 NG/L (ref 0–22)
WBC OTHER # BLD: 6.4 K/UL (ref 3.5–11.3)

## 2025-07-29 PROCEDURE — 94640 AIRWAY INHALATION TREATMENT: CPT

## 2025-07-29 PROCEDURE — 6370000000 HC RX 637 (ALT 250 FOR IP): Performed by: STUDENT IN AN ORGANIZED HEALTH CARE EDUCATION/TRAINING PROGRAM

## 2025-07-29 PROCEDURE — 6360000002 HC RX W HCPCS

## 2025-07-29 PROCEDURE — 2060000000 HC ICU INTERMEDIATE R&B

## 2025-07-29 PROCEDURE — 2500000003 HC RX 250 WO HCPCS: Performed by: STUDENT IN AN ORGANIZED HEALTH CARE EDUCATION/TRAINING PROGRAM

## 2025-07-29 PROCEDURE — 70450 CT HEAD/BRAIN W/O DYE: CPT

## 2025-07-29 PROCEDURE — 6360000002 HC RX W HCPCS: Performed by: EMERGENCY MEDICINE

## 2025-07-29 PROCEDURE — 83880 ASSAY OF NATRIURETIC PEPTIDE: CPT

## 2025-07-29 PROCEDURE — 6370000000 HC RX 637 (ALT 250 FOR IP): Performed by: FAMILY MEDICINE

## 2025-07-29 PROCEDURE — 85027 COMPLETE CBC AUTOMATED: CPT

## 2025-07-29 PROCEDURE — 6360000002 HC RX W HCPCS: Performed by: STUDENT IN AN ORGANIZED HEALTH CARE EDUCATION/TRAINING PROGRAM

## 2025-07-29 PROCEDURE — 94761 N-INVAS EAR/PLS OXIMETRY MLT: CPT

## 2025-07-29 PROCEDURE — 93010 ELECTROCARDIOGRAM REPORT: CPT | Performed by: INTERNAL MEDICINE

## 2025-07-29 PROCEDURE — 84484 ASSAY OF TROPONIN QUANT: CPT

## 2025-07-29 PROCEDURE — 36415 COLL VENOUS BLD VENIPUNCTURE: CPT

## 2025-07-29 PROCEDURE — 99232 SBSQ HOSP IP/OBS MODERATE 35: CPT | Performed by: FAMILY MEDICINE

## 2025-07-29 PROCEDURE — 80048 BASIC METABOLIC PNL TOTAL CA: CPT

## 2025-07-29 RX ORDER — IPRATROPIUM BROMIDE AND ALBUTEROL SULFATE 2.5; .5 MG/3ML; MG/3ML
1 SOLUTION RESPIRATORY (INHALATION)
Status: DISCONTINUED | OUTPATIENT
Start: 2025-07-30 | End: 2025-07-31 | Stop reason: HOSPADM

## 2025-07-29 RX ORDER — QUETIAPINE FUMARATE 50 MG/1
150 TABLET, EXTENDED RELEASE ORAL DAILY
Status: DISCONTINUED | OUTPATIENT
Start: 2025-07-29 | End: 2025-07-31 | Stop reason: HOSPADM

## 2025-07-29 RX ORDER — MORPHINE SULFATE 2 MG/ML
2 INJECTION, SOLUTION INTRAMUSCULAR; INTRAVENOUS EVERY 4 HOURS PRN
Refills: 0 | Status: DISCONTINUED | OUTPATIENT
Start: 2025-07-29 | End: 2025-07-31 | Stop reason: HOSPADM

## 2025-07-29 RX ORDER — SPIRONOLACTONE 25 MG/1
12.5 TABLET ORAL DAILY
Status: DISCONTINUED | OUTPATIENT
Start: 2025-07-29 | End: 2025-07-30

## 2025-07-29 RX ORDER — ENOXAPARIN SODIUM 100 MG/ML
1 INJECTION SUBCUTANEOUS 2 TIMES DAILY
Status: DISCONTINUED | OUTPATIENT
Start: 2025-07-29 | End: 2025-07-31 | Stop reason: HOSPADM

## 2025-07-29 RX ADMIN — EMPAGLIFLOZIN 10 MG: 10 TABLET, FILM COATED ORAL at 17:03

## 2025-07-29 RX ADMIN — SERTRALINE HYDROCHLORIDE 50 MG: 50 TABLET ORAL at 17:03

## 2025-07-29 RX ADMIN — NITROGLYCERIN 5 MCG/MIN: 20 INJECTION INTRAVENOUS at 13:13

## 2025-07-29 RX ADMIN — SODIUM CHLORIDE, PRESERVATIVE FREE 10 ML: 5 INJECTION INTRAVENOUS at 19:33

## 2025-07-29 RX ADMIN — ASPIRIN 81 MG: 81 TABLET, CHEWABLE ORAL at 08:26

## 2025-07-29 RX ADMIN — METOPROLOL SUCCINATE 12.5 MG: 25 TABLET, EXTENDED RELEASE ORAL at 08:26

## 2025-07-29 RX ADMIN — MORPHINE SULFATE 2 MG: 2 INJECTION, SOLUTION INTRAMUSCULAR; INTRAVENOUS at 03:10

## 2025-07-29 RX ADMIN — TAMSULOSIN HYDROCHLORIDE 0.4 MG: 0.4 CAPSULE ORAL at 08:26

## 2025-07-29 RX ADMIN — LISINOPRIL 10 MG: 10 TABLET ORAL at 08:26

## 2025-07-29 RX ADMIN — SODIUM CHLORIDE, PRESERVATIVE FREE 10 ML: 5 INJECTION INTRAVENOUS at 02:28

## 2025-07-29 RX ADMIN — MORPHINE SULFATE 2 MG: 2 INJECTION, SOLUTION INTRAMUSCULAR; INTRAVENOUS at 08:36

## 2025-07-29 RX ADMIN — FUROSEMIDE 20 MG: 20 TABLET ORAL at 08:26

## 2025-07-29 RX ADMIN — ENOXAPARIN SODIUM 40 MG: 100 INJECTION SUBCUTANEOUS at 08:26

## 2025-07-29 RX ADMIN — QUETIAPINE FUMARATE 150 MG: 50 TABLET, EXTENDED RELEASE ORAL at 17:04

## 2025-07-29 RX ADMIN — SPIRONOLACTONE 12.5 MG: 25 TABLET, FILM COATED ORAL at 17:03

## 2025-07-29 RX ADMIN — IPRATROPIUM BROMIDE AND ALBUTEROL SULFATE 1 DOSE: .5; 2.5 SOLUTION RESPIRATORY (INHALATION) at 12:29

## 2025-07-29 ASSESSMENT — PAIN SCALES - GENERAL
PAINLEVEL_OUTOF10: 9
PAINLEVEL_OUTOF10: 8
PAINLEVEL_OUTOF10: 0
PAINLEVEL_OUTOF10: 7
PAINLEVEL_OUTOF10: 0
PAINLEVEL_OUTOF10: 8

## 2025-07-29 ASSESSMENT — PAIN DESCRIPTION - LOCATION
LOCATION: CHEST
LOCATION: HEAD
LOCATION: HEAD

## 2025-07-29 ASSESSMENT — PAIN DESCRIPTION - DESCRIPTORS: DESCRIPTORS: PRESSURE

## 2025-07-29 ASSESSMENT — PAIN SCALES - WONG BAKER: WONGBAKER_NUMERICALRESPONSE: NO HURT

## 2025-07-29 NOTE — PROGRESS NOTES
Patient was been verbally aggressive to staff. He is very upset that he is not getting the food that he asked for and it is cold when he receives it. Writer offered to heat up food and patient declined because, \"he will not eat food that comes of of the microwave.\" He is demanding that he talks to the supervisors of nursing, dietary and the \"health supervisor.\" Writer and charge nurse, Melissa contacted the dietary manager, house supervisor and unit manager.   Patient is stating that RN is not taking him serious and RN \"needs to realize who she is talking to\" Patient states that staff can call [facility] and he is very known there and he has also ran for [facility]. Patient states that he would like to janice the hospital due to the treatment that he is receiving and he has multiple lawsuits with hospitals around [place]. Patient states that he will leave the unit and go to cafeteria to buy his own food if this problem is not fixed in a timely manner.   Patient told writer that he wants a new nurse due to writer not listening to him when he needs to be heard. Writer explained that everything is being done from RN standpoint.   Writer gave report to Ny BRENNAN. Any questions or concerns were answered. Plan of care ongoing.

## 2025-07-29 NOTE — PROGRESS NOTES
Congestive Heart Failure Education completed and charted. CHF booklet given. Patient was receptive to education.    Discussed the  importance of medication compliance.    Discussed the importance of a heart healthy diet. Discussed 2000 mg sodium-restricted daily diet.  Patient instructed to limit fluid intake to  1.5 to 2 liters per day.    Patient instructed to weigh self at the same time of each day each morning, reinforced teaching to monitor for 3-5 lb weight increase over 1-2 days notify physician if change noted.      Signs and symptoms of CHF discussed with patient, such as feeling more tired than normal, feeling short of breath, coughing that increases when lying down, sudden weight gain, swelling of the feet, legs or belly.  Patient verbalized understanding to notify physician office if these symptoms occur.  EF 14%  Pt has referral to  CHF Clinic already. New Pt appt scheduled for 8/1. Pt reminded of date and pt states emphatically that this is not a good date for him and wants a new date for a new consult appt. Pt is asked what date works for him following this admission. Pt states Mon,  8/4;  am  is better than pm.  CHF Clinic will be asked to accommodate pt at this time on the schedule, if able.

## 2025-07-29 NOTE — CARE COORDINATION
Case Management   Daily Progress Note       Patient Name: James Gabriel                   YOB: 1960  Diagnosis: Chest pain [R07.9]  Decompensated heart failure (HCC) [I50.9]                         days  Length of Stay: 1  days    Anticipated Discharge Date: Two or more days before discharge    Readmission Risk (Low < 19, Mod (19-27), High > 27): Readmission Risk Score: 18.5        Current Transitional Plan    [] Home Independently    [] Home with HC    [] Skilled Nursing Facility    [] Acute Rehabilitation    [] Long Term Acute Care (LTAC)    [] Other:     Plan for the Stay (Medical Management) :          Workflow Continuation (Additional Notes) :PT REFUSED TO TALK TO ZENA FUNEZ RN  July 29, 2025

## 2025-07-29 NOTE — H&P
[facility]  Office: [phone]  Trip Parekh DO, Ned Regalado DO, Edgar Patel DO, Trevor Ponce DO, Gian Pan MD, Veena Herbert MD, Rylee Field MD, Pam Bui MD,  Domo Álvarez MD, Chetna Lozada MD, Terry Rivas DO, Natacha Pineda MD,  Ray Chappell DO, Sterling Portillo MD, Norm Bush MD, Raymond Parekh DO, Kalyn Murillo MD, Felix Merrill MD, Vivek Barraza DO, Ibeth Lorenzo MD, Monserrat Rudd MD, Lovely Patel MD, Amarilys Wiseman MD,  Tyler Murillo DO, Belem Llamas MD,  Shirley Waterhouse, CNP,  So Gonzalez, CNP, Tammie Malloy, CNP, Moshe Talley, CNP,  Catherine Blair, SANDRA, Charity White, CNP, Christina Renteria, CNP, Yara Chapin, CNP, Franca Castellanos, CNP, Kaleigh Omalley, CNP, Mac Velez PA-C, Dominique León, CNS, Malika Shha, CNP, China Noble, CNP         [facility]   IN-PATIENT SERVICE   [facility]    HISTORY AND PHYSICAL EXAMINATION            Date:   7/28/2025  Patient name:  James Gabriel  Date of admission:  7/28/2025  2:34 PM  MRN:   [id number]  Account:  [id number]  YOB: 1960  PCP:    Ishan Feliz MD  Room:   04/04  Code Status:    Prior    Chief Complaint:     Chief Complaint   Patient presents with    Chest Pain    Shortness of Breath    Dizziness       History Obtained From:     patient    History of Present Illness:     James Gabriel is a 65 y.o. Non- / non  male who presents with Chest Pain, Shortness of Breath, and Dizziness   and is admitted to the hospital for the management of Chest pain.    65-year-old male with past medical history of tricuspid valve replacement in March/2025 at [facility], history of aortic insufficiency status post bioprosthetic AVR along with aortic root repair, COPD on home oxygen 2 to 3 L, CKD, bipolar disorder/depression, BPH, CHF with reduced EF 20% 3/2025, history of PE on Eliquis, recently admitted in observation unit  for chest pain,

## 2025-07-29 NOTE — ED NOTES
ED to inpatient nurses report      Chief Complaint:  Chief Complaint   Patient presents with    Chest Pain    Shortness of Breath    Dizziness     Present to ED from: Home via triage    MOA:     LOC: alert and orientated to name, place, date  Mobility: Independent  Oxygen Baseline: Room air    Current needs required: None   Pending ED orders: None  Present condition: Stable    Why did the patient come to the ED?   Pt arrived to ED 4 from triage. Pt arrives with complaint of chest pain and SOB, along with dizziness. Pt states that a piece of his heart is only pumping at 5-10%. Pt states that he goes to [facility] for all of his needs, but his son brought him here.   Pt denies any vision changes at this moment. Pt denies headache, n/v/d.   Cardiac monitor placed on Pt at this time. Pt initally refused monitor. EKG completed in triage. Line placed by writer        What is the plan? Admission  Any procedures or intervention occur? Line, labs, CT  Any safety concerns?? None    Mental Status:       Psych Assessment:   Psychosocial  Psychosocial (WDL): (S) Exceptions to WDL (Psychological)  Vital signs   Vitals:    07/28/25 2100 07/28/25 2128 07/28/25 2131 07/28/25 2315   BP: 126/88  (!) 138/103    Pulse: 62  76 60   Resp: 10  21 17   Temp:       SpO2: 91%   93%   Weight:  65.8 kg (145 lb)     Height:  1.727 m (5' 8\")          Vitals:  Patient Vitals for the past 24 hrs:   BP Temp Pulse Resp SpO2 Height Weight   07/28/25 2315 -- -- 60 17 93 % -- --   07/28/25 2131 (!) 138/103 -- 76 21 -- -- --   07/28/25 2128 -- -- -- -- -- 1.727 m (5' 8\") 65.8 kg (145 lb)   07/28/25 2100 126/88 -- 62 10 91 % -- --   07/28/25 1915 (!) 136/98 -- 67 16 93 % -- --   07/28/25 1643 -- -- 68 20 98 % -- --   07/28/25 1628 (!) 151/108 -- 65 -- -- -- --   07/28/25 1545 (!) 196/142 -- 76 16 96 % -- --   07/28/25 1515 (!) 162/131 -- 68 20 97 % -- --   07/28/25 1500 (!) 172/130 -- 67 18 96 % -- --   07/28/25 1444 (!) 224/148 -- -- -- 96 % -- --

## 2025-07-29 NOTE — ED NOTES
Pt verbally abusive and physically aggressive towards staff. Pt on phone stating he is posting his experience here on Facebook with nurses being named.

## 2025-07-29 NOTE — PROGRESS NOTES
RN messaged provider Rhonda Olivares at 0230 about pt requesting diet change due to want for ice cream. RN informed pt of current medical situation and reasoning for diet order. Pt became agitated and was verbally aggressive stating he was going to \"report everyone to the medical board\" along with threatening to leave AMA.   At 0254 RN messaged above provider again about pt becoming agitated with current diet order and want for pain control. RN mentioned pt threatening to leave AMA and becoming agitated at that time. Provider instructed RN okay to give ice cream, placed orders for PRN pain medication, and stated that pt can leave AMA if alert and oriented.   Pt calmed down after receiving ice cream and pain medication. RN will continue to monitor.

## 2025-07-29 NOTE — ED NOTES
Pt often removes monitoring equipment stating \"ya'll don't need it on all the time.\" Pt is educated as to the importance of monitoring to which he states he doesn't care. Resident notified.

## 2025-07-29 NOTE — PROGRESS NOTES
[facility]  Office: [phone]  Trip Parekh, DO, Ned Regalado, DO, Edgar Patel DO, Trevor Ponce, DO, Gian Pan MD, Veena Herbert MD, Rylee Field MD, Pam Bui MD,  Domo Álvarez MD, Chetna Lozada MD, Natacha Pineda MD,  Ray Chappell DO, Raymond Parekh DO, Kalyn Murillo MD,  Vivek Barraza DO, Monserrat Rudd MD, Lovely Patel MD, Amarilys Wiseman MD,  John Woo MD, Rhonda Olivares MD, Ken Bertrand MD, Luli Wilcox MD, Mark Aponte MD, Fred Amado MD, Moshe Nichols DO, Niki Randall MD, Rodney Brink DO, Norm Bush MD, Felix Merrill MD, Ray Lucero MD, Mohsin Reza, MD, Nelson Atkins MD, Shirley Waterhouse, CNP,  So Gonzalez, CNP, Moshe Talley, CNP,  Catherine Blair, DNP, Charity White, CNP, Christina Renteria, CNP, Franca Castellanos, CNP, Kaleigh Omalley, CNP, Maureen Gray, PA-C, Allison Pabon, CNP, Maribell Arredondo, CNP,  Karlie Romo, CNP, Colleen Dyer, CNP, Rajiv Fernandes, PA-C, Eleanor Horner, PA-C, Tammie Malloy, CNP,        Dominique León, CNS, Yara Chapin, CNP, China Noble, CNP         [facility]   IN-PATIENT SERVICE   [facility]    Progress Note    7/29/2025    1:00 PM    Name:   James Gabriel  MRN:     [id number]     Acct:      [id number]   Room:   2008/2008-01  IP Day:  1  Admit Date:  7/28/2025  2:34 PM    PCP:   Ishan Feliz MD  Code Status:  Full Code    Subjective:     C/C:   Chief Complaint   Patient presents with    Chest Pain    Shortness of Breath    Dizziness     Interval History Status: not changed     Patient seen and examined at bedside, no acute events overnight.  Has HA from NTG drip, mentioned an incident in the past that he had a brain bleed and eneded up sewing the hospital?  Patient vitals, labs and all providers notes were reviewed,from overnight shift and morning updates were noted and discussed with the nurse      Brief History:       65-year-old male with past medical

## 2025-07-29 NOTE — PLAN OF CARE
Problem: Chronic Conditions and Co-morbidities  Goal: Patient's chronic conditions and co-morbidity symptoms are monitored and maintained or improved  7/29/2025 1843 by Ny Cintron RN  Outcome: Progressing  7/29/2025 1246 by July Burk RN  Outcome: Progressing  7/29/2025 0536 by Etta Monteiro RN  Outcome: Progressing     Problem: Discharge Planning  Goal: Discharge to home or other facility with appropriate resources  7/29/2025 1843 by Ny Cintron RN  Outcome: Progressing  7/29/2025 1246 by July Burk RN  Outcome: Progressing  7/29/2025 0536 by Etta Monteiro RN  Outcome: Progressing     Problem: Pain  Goal: Verbalizes/displays adequate comfort level or baseline comfort level  7/29/2025 1843 by Ny Cintron RN  Outcome: Progressing  7/29/2025 1246 by July Burk RN  Outcome: Progressing  7/29/2025 0536 by Etta Monteiro RN  Outcome: Progressing     Problem: Safety - Adult  Goal: Free from fall injury  7/29/2025 1843 by Ny Cintron RN  Outcome: Progressing  7/29/2025 1246 by July Burk RN  Outcome: Progressing  7/29/2025 0536 by Etta Monteiro RN  Outcome: Progressing

## 2025-07-29 NOTE — PLAN OF CARE
Problem: Chronic Conditions and Co-morbidities  Goal: Patient's chronic conditions and co-morbidity symptoms are monitored and maintained or improved  7/29/2025 1246 by July Burk RN  Outcome: Progressing  7/29/2025 0536 by Etta Monteiro RN  Outcome: Progressing     Problem: Discharge Planning  Goal: Discharge to home or other facility with appropriate resources  7/29/2025 1246 by July Burk RN  Outcome: Progressing  7/29/2025 0536 by Etta Monteiro RN  Outcome: Progressing     Problem: Pain  Goal: Verbalizes/displays adequate comfort level or baseline comfort level  7/29/2025 1246 by July Burk RN  Outcome: Progressing  7/29/2025 0536 by Etta Monteiro RN  Outcome: Progressing     Problem: Safety - Adult  Goal: Free from fall injury  7/29/2025 1246 by July Burk RN  Outcome: Progressing  7/29/2025 0536 by Etta Monteiro RN  Outcome: Progressing

## 2025-07-29 NOTE — PROGRESS NOTES
Occupational Therapy    [facility]  Occupational Therapy Not Seen Note    DATE: 2025    NAME: James Gabriel  MRN: [id number]   : 1960      Patient not seen this date for Occupational Therapy due to:    Patient is not appropriate for active participation in OT evaluation/treatment at this time d/t bedrest orders in place. OT will continue to follow and check back as appropriate.    Electronically signed by Preeti Cadena OT on 2025 at 10:36 AM

## 2025-07-30 PROBLEM — I50.23 ACUTE ON CHRONIC CLINICAL SYSTOLIC HEART FAILURE (HCC): Status: ACTIVE | Noted: 2025-07-30

## 2025-07-30 PROCEDURE — 94760 N-INVAS EAR/PLS OXIMETRY 1: CPT

## 2025-07-30 PROCEDURE — 99223 1ST HOSP IP/OBS HIGH 75: CPT | Performed by: INTERNAL MEDICINE

## 2025-07-30 PROCEDURE — 6370000000 HC RX 637 (ALT 250 FOR IP): Performed by: STUDENT IN AN ORGANIZED HEALTH CARE EDUCATION/TRAINING PROGRAM

## 2025-07-30 PROCEDURE — 6360000002 HC RX W HCPCS: Performed by: STUDENT IN AN ORGANIZED HEALTH CARE EDUCATION/TRAINING PROGRAM

## 2025-07-30 PROCEDURE — 2500000003 HC RX 250 WO HCPCS: Performed by: STUDENT IN AN ORGANIZED HEALTH CARE EDUCATION/TRAINING PROGRAM

## 2025-07-30 PROCEDURE — 6370000000 HC RX 637 (ALT 250 FOR IP): Performed by: FAMILY MEDICINE

## 2025-07-30 PROCEDURE — 94640 AIRWAY INHALATION TREATMENT: CPT

## 2025-07-30 PROCEDURE — 6360000002 HC RX W HCPCS

## 2025-07-30 PROCEDURE — 99232 SBSQ HOSP IP/OBS MODERATE 35: CPT | Performed by: FAMILY MEDICINE

## 2025-07-30 PROCEDURE — 2060000000 HC ICU INTERMEDIATE R&B

## 2025-07-30 PROCEDURE — 6370000000 HC RX 637 (ALT 250 FOR IP): Performed by: INTERNAL MEDICINE

## 2025-07-30 RX ORDER — SACUBITRIL AND VALSARTAN 24; 26 MG/1; MG/1
1 TABLET, FILM COATED ORAL 2 TIMES DAILY
Status: DISCONTINUED | OUTPATIENT
Start: 2025-08-02 | End: 2025-07-31 | Stop reason: HOSPADM

## 2025-07-30 RX ORDER — CARVEDILOL 6.25 MG/1
6.25 TABLET ORAL 2 TIMES DAILY WITH MEALS
Status: DISCONTINUED | OUTPATIENT
Start: 2025-07-30 | End: 2025-07-31 | Stop reason: HOSPADM

## 2025-07-30 RX ORDER — SPIRONOLACTONE 25 MG/1
25 TABLET ORAL DAILY
Status: DISCONTINUED | OUTPATIENT
Start: 2025-07-31 | End: 2025-07-31 | Stop reason: HOSPADM

## 2025-07-30 RX ORDER — HYDROCODONE BITARTRATE AND ACETAMINOPHEN 5; 325 MG/1; MG/1
1 TABLET ORAL EVERY 6 HOURS PRN
Status: DISCONTINUED | OUTPATIENT
Start: 2025-07-30 | End: 2025-07-31 | Stop reason: HOSPADM

## 2025-07-30 RX ADMIN — SODIUM CHLORIDE, PRESERVATIVE FREE 10 ML: 5 INJECTION INTRAVENOUS at 08:44

## 2025-07-30 RX ADMIN — SPIRONOLACTONE 12.5 MG: 25 TABLET, FILM COATED ORAL at 08:41

## 2025-07-30 RX ADMIN — IPRATROPIUM BROMIDE AND ALBUTEROL SULFATE 1 DOSE: .5; 2.5 SOLUTION RESPIRATORY (INHALATION) at 12:08

## 2025-07-30 RX ADMIN — ENOXAPARIN SODIUM 70 MG: 100 INJECTION SUBCUTANEOUS at 20:50

## 2025-07-30 RX ADMIN — TAMSULOSIN HYDROCHLORIDE 0.4 MG: 0.4 CAPSULE ORAL at 08:41

## 2025-07-30 RX ADMIN — SERTRALINE HYDROCHLORIDE 50 MG: 50 TABLET ORAL at 08:41

## 2025-07-30 RX ADMIN — CARVEDILOL 6.25 MG: 6.25 TABLET, FILM COATED ORAL at 10:44

## 2025-07-30 RX ADMIN — ENOXAPARIN SODIUM 70 MG: 100 INJECTION SUBCUTANEOUS at 08:41

## 2025-07-30 RX ADMIN — LISINOPRIL 10 MG: 10 TABLET ORAL at 08:41

## 2025-07-30 RX ADMIN — CARVEDILOL 6.25 MG: 6.25 TABLET, FILM COATED ORAL at 18:02

## 2025-07-30 RX ADMIN — QUETIAPINE FUMARATE 150 MG: 50 TABLET, EXTENDED RELEASE ORAL at 08:41

## 2025-07-30 RX ADMIN — SODIUM CHLORIDE, PRESERVATIVE FREE 10 ML: 5 INJECTION INTRAVENOUS at 20:51

## 2025-07-30 RX ADMIN — ASPIRIN 81 MG: 81 TABLET, CHEWABLE ORAL at 08:41

## 2025-07-30 RX ADMIN — MORPHINE SULFATE 2 MG: 2 INJECTION, SOLUTION INTRAMUSCULAR; INTRAVENOUS at 08:45

## 2025-07-30 RX ADMIN — METOPROLOL SUCCINATE 12.5 MG: 25 TABLET, EXTENDED RELEASE ORAL at 08:41

## 2025-07-30 RX ADMIN — FUROSEMIDE 20 MG: 20 TABLET ORAL at 08:41

## 2025-07-30 RX ADMIN — EMPAGLIFLOZIN 10 MG: 10 TABLET, FILM COATED ORAL at 08:41

## 2025-07-30 RX ADMIN — HYDROCODONE BITARTRATE AND ACETAMINOPHEN 1 TABLET: 5; 325 TABLET ORAL at 20:51

## 2025-07-30 ASSESSMENT — PAIN SCALES - GENERAL
PAINLEVEL_OUTOF10: 0
PAINLEVEL_OUTOF10: 3
PAINLEVEL_OUTOF10: 6

## 2025-07-30 ASSESSMENT — PAIN DESCRIPTION - LOCATION: LOCATION: CHEST

## 2025-07-30 NOTE — PROGRESS NOTES
Pt continues to complain about staff/dietary. Writer listened to complaints and re-enforced that the nursing complaints haven't occurred on this shift with this writer- I.e. Call light going on and on while staff at center nurses station, not getting proper care, etc. Writer explained that on this shift, call lights have been answered in a timely manner, all concerns have been addressed by appropriate parties, pt's BP has been managed accordingly, and pt also rec'd a shower and clean linens.

## 2025-07-30 NOTE — PROGRESS NOTES
Pt upset that food pt ordered from ambassador wasn't available for dinner. Writer spoke with ambassador and offered alternatives to pt. Pt states he is \"leaving in the morning to go to [facility] because this hospital doesn't have any food.\" Pt also states \"the [facility] [employer] is going to hear about this.\" Writer apologized for lack of dietary options and exited room after listening thoroughly to complaints.

## 2025-07-30 NOTE — PROGRESS NOTES
At this time, no dinner tray arrived for pt. Unable to get ahold of dietary ambassador or manager. Call placed to [facility] .

## 2025-07-30 NOTE — CARE COORDINATION
07/30/25 1612   Readmission Assessment   Number of Days since last admission? 8-30 days   Previous Disposition Home Alone   Who is being Interviewed Patient   What was the patient's/caregiver's perception as to why they think they needed to return back to the hospital? Did not agree to original recommended D/C plan   Did you visit your Primary Care Physician after you left the hospital, before you returned this time? No   Why weren't you able to visit your PCP? Did not have an appointment   Did you see a specialist, such as Cardiac, Pulmonary, Orthopedic Physician, etc. after you left the hospital? No   Who advised the patient to return to the hospital? Self-referral   Does the patient report anything that got in the way of taking their medications? No   In our efforts to provide the best possible care to you and others like you, can you think of anything that we could have done to help you after you left the hospital the first time, so that you might not have needed to return so soon? Other (Comment)  (none)

## 2025-07-30 NOTE — CARE COORDINATION
Case Management Assessment  Initial Evaluation    Date/Time of Evaluation: 7/30/2025 4:11 PM  Assessment Completed by: Alexia Arthur RN    If patient is discharged prior to next notation, then this note serves as note for discharge by case management.    Patient Name: James Gabriel                   YOB: 1960  Diagnosis: Chest pain [R07.9]  Decompensated heart failure (HCC) [I50.9]                   Date / Time: 7/28/2025  2:34 PM    Patient Admission Status: Inpatient   Readmission Risk (Low < 19, Mod (19-27), High > 27): Readmission Risk Score: 19.2    Current PCP: Ishan Feliz MD  PCP verified by CM? (P) Yes    Chart Reviewed: Yes      History Provided by: (P) Patient  Patient Orientation: (P) Alert and Oriented    Patient Cognition: (P) Alert    Hospitalization in the last 30 days (Readmission):  Yes    If yes, Readmission Assessment in CM Navigator will be completed.    Advance Directives:      Code Status: Full Code   Patient's Primary Decision Maker is: (P) Legal Next of Kin      Discharge Planning:    Patient lives with: (P) Alone Type of Home: (P) House  Primary Care Giver: (P) Self  Patient Support Systems include: (P) Family Members   Current Financial resources: (P) Medicare, Medicaid  Current community resources: (P) None  Current services prior to admission: (P) Durable Medical Equipment            Current DME: (P) Oxygen Therapy (Comment), Other (Comment), Walker (Oxygen is via [employer]-, lifevest via [employer])            Type of Home Care services:  (P) None    ADLS  Prior functional level: (P) Independent in ADLs/IADLs  Current functional level: (P) Independent in ADLs/IADLs    PT AM-PAC:   /24  OT AM-PAC:   /24    Family can provide assistance at DC: (P) No  Would you like Case Management to discuss the discharge plan with any other family members/significant others, and if so, who? (P) No  Plans to Return to Present Housing: (P) Yes  Other Identified Issues/Barriers to RETURNING to

## 2025-07-30 NOTE — PLAN OF CARE
Problem: Chronic Conditions and Co-morbidities  Goal: Patient's chronic conditions and co-morbidity symptoms are monitored and maintained or improved  7/29/2025 2051 by Court Wells RN  Outcome: Progressing     Problem: Discharge Planning  Goal: Discharge to home or other facility with appropriate resources  7/29/2025 2051 by Court Wells, RN  Outcome: Progressing     Problem: Pain  Goal: Verbalizes/displays adequate comfort level or baseline comfort level  7/29/2025 2051 by Court Wells, RN  Outcome: Progressing     Problem: Safety - Adult  Goal: Free from fall injury  7/29/2025 2051 by Court Wells, RN  Outcome: Progressing

## 2025-07-30 NOTE — PROGRESS NOTES
Patient refused vital signs check for midnight. Patient states \"Now can you just let me sleep!?\" Writer educated patient that unit policy is vitals signs are checked every 4 hours. Writer educated patient on risks of not checking vitals and importance of compliance. Patient acknowledged. Shirley Waterhouse, NP notified.

## 2025-07-30 NOTE — PROGRESS NOTES
Rounded on patient this morning as patient had some concerns.  Pt was upset raised his voice that \"the monitor was off all night.\" He pressed his call light and he stated \"It was on hold for 10 minutes and I finally got up and went to the door and the nurses were at the desk laughing and caring on\" Patient stated that he was going to serve a \"sunshine suit\" Pt showed the writer all the suits he filed via his phone. Concerns were address at this time.

## 2025-07-30 NOTE — PLAN OF CARE
Problem: Chronic Conditions and Co-morbidities  Goal: Patient's chronic conditions and co-morbidity symptoms are monitored and maintained or improved  7/30/2025 0954 by Amara Fox, RN  Outcome: Progressing  Flowsheets (Taken 7/30/2025 0839)  Care Plan - Patient's Chronic Conditions and Co-Morbidity Symptoms are Monitored and Maintained or Improved:   Monitor and assess patient's chronic conditions and comorbid symptoms for stability, deterioration, or improvement   Collaborate with multidisciplinary team to address chronic and comorbid conditions and prevent exacerbation or deterioration   Update acute care plan with appropriate goals if chronic or comorbid symptoms are exacerbated and prevent overall improvement and discharge  7/29/2025 2051 by Court Wells, RN  Outcome: Progressing     Problem: Discharge Planning  Goal: Discharge to home or other facility with appropriate resources  7/30/2025 0954 by Amara Fox, RN  Outcome: Progressing  Flowsheets (Taken 7/30/2025 0839)  Discharge to home or other facility with appropriate resources:   Identify barriers to discharge with patient and caregiver   Arrange for needed discharge resources and transportation as appropriate   Identify discharge learning needs (meds, wound care, etc)  7/29/2025 2051 by Court Wells, RN  Outcome: Progressing     Problem: Pain  Goal: Verbalizes/displays adequate comfort level or baseline comfort level  7/30/2025 0954 by Amara Fox RN  Outcome: Progressing  7/29/2025 2051 by Court Wells, RN  Outcome: Progressing     Problem: Safety - Adult  Goal: Free from fall injury  7/30/2025 0954 by Amara Fox, RN  Outcome: Progressing  7/29/2025 2051 by Court Wells, RN  Outcome: Progressing

## 2025-07-30 NOTE — PROGRESS NOTES
Pt came out to [facility], yelling at staff because tele alarm was going off and his \"call light has been on for a half hour.\" Writer firmly told pt he cannot talk to staff that way, his tele was alarming because he took off his wires (and it was not alarming when writer and night RN went into room 3 minutes prior for bedside report- which pt refused, yelling at both RN's that he hasn't gotten any sleep and to get out) and that writer just answered call light that had been going off for less than 2 minutes. Pt continued yelling that he hasn't gotten any sleep in 3 days and that he wants to speak to a patient advocate. House supervisor notified.

## 2025-07-30 NOTE — PROGRESS NOTES
PM    FIO2 INFORMATION NOT PROVIDED 07/28/2025 05:26 PM     Lab Results   Component Value Date/Time    SPECIAL LFA 3ML 03/11/2025 10:16 PM     Lab Results   Component Value Date/Time    CULTURE NO GROWTH 5 DAYS 03/11/2025 10:16 PM       Radiology:  CT HEAD WO CONTRAST  Result Date: 7/29/2025  No acute intracranial abnormality.     CT CHEST PULMONARY EMBOLISM W CONTRAST  Result Date: 7/28/2025  1. No evidence of pulmonary embolism or acute pulmonary abnormality. 2. Moderate cardiomegaly. 3. 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     XR CHEST (2 VW)  Result Date: 7/28/2025  No acute process.       Physical Examination:        Physical Exam  Vitals and nursing note reviewed.   Constitutional:       General: He is not in acute distress.  HENT:      Head: Normocephalic and atraumatic.   Eyes:      Conjunctiva/sclera: Conjunctivae normal.      Pupils: Pupils are equal, round, and reactive to light.   Cardiovascular:      Rate and Rhythm: Normal rate and regular rhythm.      Heart sounds: Murmur heard.   Pulmonary:      Effort: Pulmonary effort is normal. No accessory muscle usage or respiratory distress.      Breath sounds: No stridor. No decreased breath sounds, wheezing, rhonchi or rales.   Abdominal:      General: Bowel sounds are normal. There is no distension.      Palpations: Abdomen is soft. Abdomen is not rigid.      Tenderness: There is no abdominal tenderness. There is no guarding.   Musculoskeletal:         General: No tenderness.   Skin:     General: Skin is warm and dry.      Findings: No erythema, lesion or rash.   Neurological:      Mental Status: He is alert and oriented to person, place, and time.      Cranial Nerves: No cranial nerve deficit.      Motor: No seizure activity.   Psychiatric:         Speech: Speech normal.         Behavior: Behavior normal. Behavior is cooperative.         Assessment:        Hospital Problems           Last Modified POA    * (Principal) Chest pain 7/28/2025 Yes    S/P AVR (aortic  valve replacement) 7/28/2025 Yes    Essential hypertension 7/28/2025 Yes    Hep C w/o coma, chronic (HCC) 7/28/2025 Yes    Schizophrenia (HCC) 7/28/2025 Yes    Bipolar 1 disorder, depressed (HCC) 7/28/2025 Yes    Nonischemic cardiomyopathy (HCC) 7/28/2025 Yes    Shortness of breath 7/28/2025 Yes    Congestive heart failure with cardiomyopathy (HCC) 7/28/2025 Yes    Decompensated heart failure (HCC) 7/29/2025 Yes       Plan:        Chest pain: seems atypical, cardiology following     Nonischemic cardiomyopathy: Most recent cath in February 2025  Heart failure with severely reduced EF 10%: Continue current medication, maximize as tolerated, appreciate cardiology recommendations.  Patient does have LifeVest and will benefit from AICD with his primary cardiologist    Status post tricuspid angioplasty ring and bioprosthetic AVR along with aortic root repair @ [facility]    Hypertensive urgency:     Cardiology evaluation is ongoing  No change in chest pain with nitroglycerin causing headache, we was titrated down to stop.    Patient did not come in with LifeVest.  Resuming patient home medications i.  Can resume Jardiance and spironolactone tomorrow  Due to elevated blood pressure and dizziness ordering CT head to rule out any acute bleed or ischemic stroke.  Can resume patient anticoagulation after CT results.  Orthostatic vitals.        COPD: Not in exacerbation.  Currently on room air     History of pulm embolism: CTA negative for PE. , on AC    CKD: Creatinine at baseline.    Bipolar disorder/depression/schizophrenia: On Depakote, Seroquel, no recent dispense report for trazodone or Zoloft    Tobacco use      Patient reports he is not happy with the care here  and went again to mention his position and credentials and started  to show me how many lawsuits he held against health care organizations in the past and that he is known to [facility] .. I explained that I would provide the same care regardless of anything for all my

## 2025-07-31 VITALS
RESPIRATION RATE: 19 BRPM | OXYGEN SATURATION: 95 % | DIASTOLIC BLOOD PRESSURE: 106 MMHG | WEIGHT: 145 LBS | TEMPERATURE: 98 F | SYSTOLIC BLOOD PRESSURE: 142 MMHG | HEART RATE: 76 BPM | HEIGHT: 68 IN | BODY MASS INDEX: 21.98 KG/M2

## 2025-07-31 PROCEDURE — 6370000000 HC RX 637 (ALT 250 FOR IP): Performed by: FAMILY MEDICINE

## 2025-07-31 PROCEDURE — 6360000002 HC RX W HCPCS: Performed by: STUDENT IN AN ORGANIZED HEALTH CARE EDUCATION/TRAINING PROGRAM

## 2025-07-31 PROCEDURE — 99239 HOSP IP/OBS DSCHRG MGMT >30: CPT | Performed by: FAMILY MEDICINE

## 2025-07-31 PROCEDURE — 6370000000 HC RX 637 (ALT 250 FOR IP): Performed by: INTERNAL MEDICINE

## 2025-07-31 PROCEDURE — 6370000000 HC RX 637 (ALT 250 FOR IP): Performed by: STUDENT IN AN ORGANIZED HEALTH CARE EDUCATION/TRAINING PROGRAM

## 2025-07-31 PROCEDURE — 2500000003 HC RX 250 WO HCPCS: Performed by: STUDENT IN AN ORGANIZED HEALTH CARE EDUCATION/TRAINING PROGRAM

## 2025-07-31 RX ORDER — TAMSULOSIN HYDROCHLORIDE 0.4 MG/1
0.4 CAPSULE ORAL DAILY
Qty: 30 CAPSULE | Refills: 0 | Status: SHIPPED | OUTPATIENT
Start: 2025-07-31 | End: 2025-07-31

## 2025-07-31 RX ORDER — ASPIRIN 81 MG/1
81 TABLET, CHEWABLE ORAL DAILY
Qty: 30 TABLET | Refills: 3 | Status: SHIPPED | OUTPATIENT
Start: 2025-07-31

## 2025-07-31 RX ORDER — SPIRONOLACTONE 25 MG/1
25 TABLET ORAL DAILY
Qty: 30 TABLET | Refills: 3 | Status: SHIPPED | OUTPATIENT
Start: 2025-07-31

## 2025-07-31 RX ORDER — QUETIAPINE FUMARATE 50 MG/1
150 TABLET, EXTENDED RELEASE ORAL
Qty: 21 TABLET | Refills: 0 | Status: SHIPPED | OUTPATIENT
Start: 2025-07-31

## 2025-07-31 RX ORDER — TRAZODONE HYDROCHLORIDE 100 MG/1
100 TABLET ORAL DAILY
Qty: 7 TABLET | Refills: 0 | Status: SHIPPED | OUTPATIENT
Start: 2025-07-31

## 2025-07-31 RX ORDER — ALBUTEROL SULFATE 90 UG/1
1-2 INHALANT RESPIRATORY (INHALATION) EVERY 4 HOURS PRN
Qty: 1 EACH | Refills: 0 | Status: SHIPPED | OUTPATIENT
Start: 2025-07-31

## 2025-07-31 RX ORDER — SACUBITRIL AND VALSARTAN 24; 26 MG/1; MG/1
1 TABLET, FILM COATED ORAL 2 TIMES DAILY
Qty: 60 TABLET | Refills: 1 | Status: SHIPPED | OUTPATIENT
Start: 2025-08-02

## 2025-07-31 RX ORDER — HYDROCODONE BITARTRATE AND ACETAMINOPHEN 5; 325 MG/1; MG/1
1 TABLET ORAL EVERY 8 HOURS PRN
Qty: 6 TABLET | Refills: 0 | Status: SHIPPED | OUTPATIENT
Start: 2025-07-31 | End: 2025-08-02

## 2025-07-31 RX ORDER — TAMSULOSIN HYDROCHLORIDE 0.4 MG/1
0.4 CAPSULE ORAL DAILY
Qty: 30 CAPSULE | Refills: 0 | Status: SHIPPED | OUTPATIENT
Start: 2025-07-31

## 2025-07-31 RX ORDER — FUROSEMIDE 20 MG/1
20 TABLET ORAL DAILY
Qty: 60 TABLET | Refills: 0 | Status: SHIPPED | OUTPATIENT
Start: 2025-07-31

## 2025-07-31 RX ORDER — CARVEDILOL 6.25 MG/1
6.25 TABLET ORAL 2 TIMES DAILY WITH MEALS
Qty: 60 TABLET | Refills: 3 | Status: SHIPPED | OUTPATIENT
Start: 2025-07-31

## 2025-07-31 RX ADMIN — SODIUM CHLORIDE, PRESERVATIVE FREE 10 ML: 5 INJECTION INTRAVENOUS at 07:43

## 2025-07-31 RX ADMIN — TAMSULOSIN HYDROCHLORIDE 0.4 MG: 0.4 CAPSULE ORAL at 07:42

## 2025-07-31 RX ADMIN — ASPIRIN 81 MG: 81 TABLET, CHEWABLE ORAL at 07:42

## 2025-07-31 RX ADMIN — HYDROCODONE BITARTRATE AND ACETAMINOPHEN 1 TABLET: 5; 325 TABLET ORAL at 08:02

## 2025-07-31 RX ADMIN — CARVEDILOL 6.25 MG: 6.25 TABLET, FILM COATED ORAL at 07:42

## 2025-07-31 RX ADMIN — FUROSEMIDE 20 MG: 20 TABLET ORAL at 07:43

## 2025-07-31 RX ADMIN — SPIRONOLACTONE 25 MG: 25 TABLET, FILM COATED ORAL at 07:43

## 2025-07-31 RX ADMIN — SERTRALINE HYDROCHLORIDE 50 MG: 50 TABLET ORAL at 07:42

## 2025-07-31 RX ADMIN — QUETIAPINE FUMARATE 150 MG: 50 TABLET, EXTENDED RELEASE ORAL at 08:02

## 2025-07-31 RX ADMIN — EMPAGLIFLOZIN 10 MG: 10 TABLET, FILM COATED ORAL at 07:43

## 2025-07-31 RX ADMIN — ENOXAPARIN SODIUM 70 MG: 100 INJECTION SUBCUTANEOUS at 07:43

## 2025-07-31 ASSESSMENT — PAIN SCALES - GENERAL
PAINLEVEL_OUTOF10: 8
PAINLEVEL_OUTOF10: 8

## 2025-07-31 ASSESSMENT — PAIN DESCRIPTION - LOCATION: LOCATION: CHEST

## 2025-07-31 NOTE — DISCHARGE SUMMARY
Writer gave pt discharge education and paperwork. All questions asked and answered. IV and telemetry has been removed. Pt discharging with all of his belongings. Patient choosing to  his medication from our out patient pharmacy.

## 2025-07-31 NOTE — PROGRESS NOTES
Occupational Therapy    [place]  Occupational Therapy Not Seen Note    DATE: 2025    NAME: James Gabriel  MRN: [id number]   : 1960      Patient not seen this date for Occupational Therapy due to:    Patient independent with ADLs and functional tasks with no acute OT needs. Will defer OT evaluation at this time. Please reorder OT if future needs arise. Patient reporting independence in functional mobility, transfers, and all ADL tasks. Educated to inform care team if changes to independence arise. D/C OT.     Electronically signed by Preeti Cadena OT on 2025 at 9:39 AM

## 2025-07-31 NOTE — PROGRESS NOTES
Physical Therapy        Physical Therapy Cancel Note      DATE: 2025    NAME: James Gabriel  MRN: [id number]   : 1960      Patient not seen this date for Physical Therapy due to:    Patient independent with functional mobility, up in halls. Will defer PT evaluation at this time. Please reorder PT if future needs arise.       Electronically signed by Colleen Robles PT on 2025 at 9:38 AM

## 2025-07-31 NOTE — DISCHARGE SUMMARY
[facility]  Office: [phone]  Trip Parekh DO, Ned Regalado DO, Edgar Patel DO, Trevor Ponce DO, Gian Pan MD, Veena Herbert MD, Rylee Field MD, Pam Bui MD,  Domo Álvarez MD, Chetna Lozada MD, Natacha Pineda MD,  Ray Chappell DO, Raymond Parekh DO, Kalyn Murillo MD,  Vivek Barraza DO, Monserrat Rudd MD, Lovely Patel MD, Amarilys Wiseman MD,  John Woo MD, Rhonda Olivares MD, Ken Bertrand MD, Luli Wilcox MD, Mark Aponte MD, Fred Amado MD, Moshe Nichols DO, Niki Randall MD, Rodney Brink DO, Norm Bush MD, Felix Merrill MD, Ray Lucero MD, Mohsin Reza, MD, Nelson Atkins MD, Shirley Waterhouse, CNP,  So Gonzalez, CNP, Moshe Talley, CNP,  Catherine Blair, DNP, Charity White, CNP, Christina Renteria, CNP, Franca Castellanos, CNP, Kaleigh Omalley, CNP, Maureen Gray, PA-C, Allison Pabon, CNP, Maribell Arredondo, CNP,  Karlie Romo, CNP, Colleen Dyer, CNP, Rajiv Fernandes, PA-C, Eleanor Horner PA-C, Tammie Malloy, CNP,        Dominique León, CNS, Yara Chapin, CNP, China Noble, CNP         [facility]   IN-PATIENT SERVICE   [facility]    Discharge Summary     Patient ID: James Gabriel  :  1960   MRN: [id number]     ACCOUNT:  [id number]   Patient's PCP: Ishan Feliz MD  Admit Date: 2025   Discharge Date: 2025     Length of Stay: 3  Code Status:  Prior  Admitting Physician: No admitting provider for patient encounter.  Discharge Physician: Pam Bui MD     Active Discharge Diagnoses:     Hospital Problem Lists:  Principal Problem:    Chest pain  Active Problems:    S/P AVR (aortic valve replacement)    Essential hypertension    Hep C w/o coma, chronic (HCC)    Schizophrenia (HCC)    Bipolar 1 disorder, depressed (HCC)    Nonischemic cardiomyopathy (HCC)    Shortness of breath    Congestive heart failure with cardiomyopathy (HCC)    Decompensated heart failure (HCC)    Acute on

## 2025-07-31 NOTE — PLAN OF CARE
Problem: Chronic Conditions and Co-morbidities  Goal: Patient's chronic conditions and co-morbidity symptoms are monitored and maintained or improved  7/31/2025 0957 by Marybeth Pierce RN  Outcome: Completed  7/30/2025 2117 by Court Wells RN  Outcome: Progressing     Problem: Discharge Planning  Goal: Discharge to home or other facility with appropriate resources  7/31/2025 0957 by Marybeth Pierce RN  Outcome: Completed  Flowsheets (Taken 7/31/2025 0800)  Discharge to home or other facility with appropriate resources:   Identify barriers to discharge with patient and caregiver   Arrange for needed discharge resources and transportation as appropriate   Identify discharge learning needs (meds, wound care, etc)   Refer to discharge planning if patient needs post-hospital services based on physician order or complex needs related to functional status, cognitive ability or social support system  7/30/2025 2117 by Court Wells, RN  Outcome: Progressing     Problem: Pain  Goal: Verbalizes/displays adequate comfort level or baseline comfort level  7/31/2025 0957 by Marybeth Pierce RN  Outcome: Completed  7/30/2025 2117 by Court Wells, RN  Outcome: Progressing     Problem: Safety - Adult  Goal: Free from fall injury  7/31/2025 0957 by Marybeth Pierce RN  Outcome: Completed  7/30/2025 2117 by Court Wells RN  Outcome: Progressing

## 2025-07-31 NOTE — PLAN OF CARE
Problem: Chronic Conditions and Co-morbidities  Goal: Patient's chronic conditions and co-morbidity symptoms are monitored and maintained or improved  7/30/2025 2117 by Court Wells RN  Outcome: Progressing     Problem: Discharge Planning  Goal: Discharge to home or other facility with appropriate resources  7/30/2025 2117 by Court Wells, RN  Outcome: Progressing     Problem: Pain  Goal: Verbalizes/displays adequate comfort level or baseline comfort level  7/30/2025 2117 by Court Wells, RN  Outcome: Progressing     Problem: Safety - Adult  Goal: Free from fall injury  7/30/2025 2117 by Court Wells, RN  Outcome: Progressing

## 2025-07-31 NOTE — CARE COORDINATION
Case Management   Daily Progress Note       Patient Name: James Gabriel                   YOB: 1960  Diagnosis: Chest pain [R07.9]  Decompensated heart failure (HCC) [I50.9]                       GMLOS: 3 days  Length of Stay: 3  days    Anticipated Discharge Date: Ready for discharge    Readmission Risk (Low < 19, Mod (19-27), High > 27): Readmission Risk Score: 19.9        Current Transitional Plan    [x] Home Independently    [] Home with HC    [] Skilled Nursing Facility    [] Acute Rehabilitation    [] Long Term Acute Care (LTAC)    [] Other:     Plan for the Stay (Medical Management) :          Workflow Continuation (Additional Notes) : patient returning home. He denies needs        Myranda Shaffer RN  July 31, 2025

## 2025-08-05 ENCOUNTER — TELEPHONE (OUTPATIENT)
Dept: OTHER | Age: 65
End: 2025-08-05